# Patient Record
Sex: MALE | Race: WHITE | NOT HISPANIC OR LATINO | Employment: OTHER | URBAN - METROPOLITAN AREA
[De-identification: names, ages, dates, MRNs, and addresses within clinical notes are randomized per-mention and may not be internally consistent; named-entity substitution may affect disease eponyms.]

---

## 2017-05-03 ENCOUNTER — HOSPITAL ENCOUNTER (INPATIENT)
Facility: OTHER | Age: 67
LOS: 2 days | Discharge: HOME | DRG: 300 | End: 2017-05-05
Attending: EMERGENCY MEDICINE | Admitting: INTERNAL MEDICINE
Payer: MEDICARE

## 2017-05-03 DIAGNOSIS — I73.9 CLAUDICATION OF RIGHT LOWER EXTREMITY: ICD-10-CM

## 2017-05-03 DIAGNOSIS — I10 ESSENTIAL (PRIMARY) HYPERTENSION: ICD-10-CM

## 2017-05-03 DIAGNOSIS — I73.9 PERIPHERAL ARTERY DISEASE: ICD-10-CM

## 2017-05-03 LAB
ABO GROUP BLD: NORMAL
ALBUMIN SERPL BCP-MCNC: 4 G/DL
ALP SERPL-CCNC: 124 U/L
ALT SERPL W/O P-5'-P-CCNC: 20 U/L
ANION GAP SERPL CALC-SCNC: 9 MMOL/L
APTT BLDCRRT: 30 SEC
AST SERPL-CCNC: 19 U/L
BASOPHILS # BLD AUTO: 0.02 K/UL
BASOPHILS NFR BLD: 0.3 %
BILIRUB SERPL-MCNC: 0.6 MG/DL
BLD GP AB SCN CELLS X3 SERPL QL: NORMAL
BUN SERPL-MCNC: 26 MG/DL
CALCIUM SERPL-MCNC: 9.6 MG/DL
CHLORIDE SERPL-SCNC: 108 MMOL/L
CO2 SERPL-SCNC: 24 MMOL/L
CREAT SERPL-MCNC: 1.6 MG/DL
DIFFERENTIAL METHOD: ABNORMAL
EOSINOPHIL # BLD AUTO: 0.1 K/UL
EOSINOPHIL NFR BLD: 2 %
ERYTHROCYTE [DISTWIDTH] IN BLOOD BY AUTOMATED COUNT: 13.2 %
EST. GFR  (AFRICAN AMERICAN): 50 ML/MIN/1.73 M^2
EST. GFR  (NON AFRICAN AMERICAN): 43 ML/MIN/1.73 M^2
GLUCOSE SERPL-MCNC: 105 MG/DL
HCT VFR BLD AUTO: 39.3 %
HGB BLD-MCNC: 13.4 G/DL
INR PPP: 0.9
LYMPHOCYTES # BLD AUTO: 1.4 K/UL
LYMPHOCYTES NFR BLD: 21.7 %
MCH RBC QN AUTO: 30.7 PG
MCHC RBC AUTO-ENTMCNC: 34.1 %
MCV RBC AUTO: 90 FL
MONOCYTES # BLD AUTO: 0.5 K/UL
MONOCYTES NFR BLD: 7.6 %
NEUTROPHILS # BLD AUTO: 4.4 K/UL
NEUTROPHILS NFR BLD: 68.1 %
PLATELET # BLD AUTO: 140 K/UL
PMV BLD AUTO: 10.7 FL
POTASSIUM SERPL-SCNC: 5.1 MMOL/L
PROT SERPL-MCNC: 7.3 G/DL
PROTHROMBIN TIME: 10.5 SEC
RBC # BLD AUTO: 4.36 M/UL
RH BLD: NORMAL
SODIUM SERPL-SCNC: 141 MMOL/L
WBC # BLD AUTO: 6.49 K/UL

## 2017-05-03 PROCEDURE — 99284 EMERGENCY DEPT VISIT MOD MDM: CPT

## 2017-05-03 PROCEDURE — 96361 HYDRATE IV INFUSION ADD-ON: CPT

## 2017-05-03 PROCEDURE — 96366 THER/PROPH/DIAG IV INF ADDON: CPT

## 2017-05-03 PROCEDURE — 86901 BLOOD TYPING SEROLOGIC RH(D): CPT

## 2017-05-03 PROCEDURE — 86900 BLOOD TYPING SEROLOGIC ABO: CPT

## 2017-05-03 PROCEDURE — 80053 COMPREHEN METABOLIC PANEL: CPT

## 2017-05-03 PROCEDURE — 93005 ELECTROCARDIOGRAM TRACING: CPT

## 2017-05-03 PROCEDURE — 96365 THER/PROPH/DIAG IV INF INIT: CPT

## 2017-05-03 PROCEDURE — 85730 THROMBOPLASTIN TIME PARTIAL: CPT

## 2017-05-03 PROCEDURE — 25000003 PHARM REV CODE 250: Performed by: EMERGENCY MEDICINE

## 2017-05-03 PROCEDURE — 11000001 HC ACUTE MED/SURG PRIVATE ROOM

## 2017-05-03 PROCEDURE — 86850 RBC ANTIBODY SCREEN: CPT

## 2017-05-03 PROCEDURE — 85025 COMPLETE CBC W/AUTO DIFF WBC: CPT

## 2017-05-03 PROCEDURE — 93010 ELECTROCARDIOGRAM REPORT: CPT | Mod: ,,, | Performed by: INTERNAL MEDICINE

## 2017-05-03 PROCEDURE — 85610 PROTHROMBIN TIME: CPT

## 2017-05-03 RX ORDER — HEPARIN SODIUM 10000 [USP'U]/100ML
18 INJECTION, SOLUTION INTRAVENOUS
Status: COMPLETED | OUTPATIENT
Start: 2017-05-03 | End: 2017-05-03

## 2017-05-03 RX ORDER — ASPIRIN 81 MG/1
81 TABLET ORAL DAILY
Status: DISCONTINUED | OUTPATIENT
Start: 2017-05-04 | End: 2017-05-05 | Stop reason: HOSPADM

## 2017-05-03 RX ORDER — ALLOPURINOL 100 MG/1
100 TABLET ORAL DAILY
Status: DISCONTINUED | OUTPATIENT
Start: 2017-05-04 | End: 2017-05-05 | Stop reason: HOSPADM

## 2017-05-03 RX ORDER — AMLODIPINE BESYLATE 5 MG/1
10 TABLET ORAL DAILY
Status: DISCONTINUED | OUTPATIENT
Start: 2017-05-04 | End: 2017-05-05 | Stop reason: HOSPADM

## 2017-05-03 RX ORDER — PANTOPRAZOLE SODIUM 40 MG/1
40 TABLET, DELAYED RELEASE ORAL DAILY
Status: DISCONTINUED | OUTPATIENT
Start: 2017-05-04 | End: 2017-05-05 | Stop reason: HOSPADM

## 2017-05-03 RX ORDER — ONDANSETRON 2 MG/ML
4 INJECTION INTRAMUSCULAR; INTRAVENOUS EVERY 12 HOURS PRN
Status: DISCONTINUED | OUTPATIENT
Start: 2017-05-03 | End: 2017-05-05 | Stop reason: HOSPADM

## 2017-05-03 RX ORDER — BENAZEPRIL HYDROCHLORIDE 10 MG/1
20 TABLET ORAL DAILY
Status: DISCONTINUED | OUTPATIENT
Start: 2017-05-04 | End: 2017-05-05 | Stop reason: HOSPADM

## 2017-05-03 RX ORDER — SODIUM CHLORIDE AND POTASSIUM CHLORIDE 150; 900 MG/100ML; MG/100ML
INJECTION, SOLUTION INTRAVENOUS CONTINUOUS
Status: DISCONTINUED | OUTPATIENT
Start: 2017-05-03 | End: 2017-05-05 | Stop reason: HOSPADM

## 2017-05-03 RX ORDER — TAMSULOSIN HYDROCHLORIDE 0.4 MG/1
0.4 CAPSULE ORAL DAILY
Status: DISCONTINUED | OUTPATIENT
Start: 2017-05-04 | End: 2017-05-05 | Stop reason: HOSPADM

## 2017-05-03 RX ORDER — ATORVASTATIN CALCIUM 20 MG/1
40 TABLET, FILM COATED ORAL DAILY
Status: DISCONTINUED | OUTPATIENT
Start: 2017-05-04 | End: 2017-05-05 | Stop reason: HOSPADM

## 2017-05-03 RX ORDER — CARVEDILOL 6.25 MG/1
6.25 TABLET ORAL 2 TIMES DAILY
Status: DISCONTINUED | OUTPATIENT
Start: 2017-05-03 | End: 2017-05-05 | Stop reason: HOSPADM

## 2017-05-03 RX ORDER — CHOLECALCIFEROL (VITAMIN D3) 25 MCG
2000 TABLET ORAL DAILY
Status: DISCONTINUED | OUTPATIENT
Start: 2017-05-04 | End: 2017-05-05 | Stop reason: HOSPADM

## 2017-05-03 RX ORDER — OXYCODONE HYDROCHLORIDE 5 MG/1
5 TABLET ORAL EVERY 4 HOURS PRN
Status: DISCONTINUED | OUTPATIENT
Start: 2017-05-03 | End: 2017-05-05 | Stop reason: HOSPADM

## 2017-05-03 RX ADMIN — CARVEDILOL 6.25 MG: 6.25 TABLET, FILM COATED ORAL at 09:05

## 2017-05-03 RX ADMIN — HEPARIN SODIUM AND DEXTROSE 18 UNITS/KG/HR: 10000; 5 INJECTION INTRAVENOUS at 08:05

## 2017-05-03 RX ADMIN — POTASSIUM CHLORIDE AND SODIUM CHLORIDE: 900; 150 INJECTION, SOLUTION INTRAVENOUS at 10:05

## 2017-05-03 NOTE — IP AVS SNAPSHOT
[facility] ([facility])  [address] [place], [place] [id number]  Phone: [phone]           Patient Discharge Instructions   Our goal is to set you up for success. This packet includes information on your condition, medications, and your home care.  It will help you care for yourself to prevent having to return to the hospital.     Please ask your nurse if you have any questions.      There are many details to remember when preparing to leave the hospital. Here is what you will need to do:    1. Take your medicine. If you are prescribed medications, review your Medication List on the following pages. You may have new medications to  at the pharmacy and others that you'll need to stop taking. Review the instructions for how and when to take your medications. Talk with your doctor or nurses if you are unsure of what to do.     2. Go to your follow-up appointments. Specific follow-up information is listed in the following pages. Your may be contacted by a nurse or clinical provider about future appointments. Be sure we have all of the phone numbers to reach you. Please contact your provider's office if you are unable to make an appointment.     3. Watch for warning signs. Your doctor or nurse will give you detailed warning signs to watch for and when to call for assistance. These instructions may also include educational information about your condition. If you experience any of warning signs to your health, call your doctor.               ** Verify the list of medication(s) below is accurate and up to date. Carry this with you in case of emergency. If your medications have changed, please notify your healthcare provider.             Medication List      START taking these medications        Additional Info                      clopidogrel 75 mg tablet   Commonly known as:  PLAVIX   Quantity:  90 tablet   Refills:  0   Dose:  75 mg    Instructions:  Take 1 tablet (75 mg total) by mouth once  daily.     Begin Date    AM    Noon    PM    Bedtime         CONTINUE taking these medications        Additional Info                      allopurinol 100 MG tablet   Commonly known as:  ZYLOPRIM   Refills:  3   Dose:  100 mg    Last time this was given:  100 mg on 5/5/2017  9:57 AM   Instructions:  Take 100 mg by mouth once daily.     Begin Date    AM    Noon    PM    Bedtime       amlodipine 10 MG tablet   Commonly known as:  NORVASC   Quantity:  90 tablet   Refills:  3   Dose:  10 mg    Last time this was given:  10 mg on 5/5/2017  9:55 AM   Instructions:  Take 1 tablet (10 mg total) by mouth once daily.     Begin Date    AM    Noon    PM    Bedtime       aspirin 81 MG EC tablet   Commonly known as:  ECOTRIN   Quantity:  90 tablet   Refills:  3   Dose:  81 mg    Last time this was given:  81 mg on 5/5/2017  9:58 AM   Instructions:  Take 1 tablet (81 mg total) by mouth once daily.     Begin Date    AM    Noon    PM    Bedtime       atorvastatin 40 MG tablet   Commonly known as:  LIPITOR   Quantity:  30 tablet   Refills:  0    Last time this was given:  40 mg on 5/5/2017  9:56 AM   Instructions:  TAKE 1 TABLET BY MOUTH ONCE DAILY     Begin Date    AM    Noon    PM    Bedtime       benazepril 20 MG tablet   Commonly known as:  LOTENSIN   Quantity:  90 tablet   Refills:  3   Dose:  20 mg    Last time this was given:  20 mg on 5/5/2017  9:56 AM   Instructions:  Take 1 tablet (20 mg total) by mouth once daily.     Begin Date    AM    Noon    PM    Bedtime       carvedilol 6.25 MG tablet   Commonly known as:  COREG   Quantity:  180 tablet   Refills:  3   Dose:  6.25 mg    Last time this was given:  6.25 mg on 5/5/2017  9:57 AM   Instructions:  Take 1 tablet (6.25 mg total) by mouth 2 (two) times daily.     Begin Date    AM    Noon    PM    Bedtime       tamsulosin 0.4 mg Cp24   Commonly known as:  FLOMAX   Refills:  2   Dose:  0.4 mg    Last time this was given:  0.4 mg on 5/5/2017  9:55 AM   Instructions:  Take 0.4 mg  by mouth once daily.     Begin Date    AM    Noon    PM    Bedtime       vitamin D 1000 units Tab   Refills:  0   Dose:  2000 Units    Instructions:  Take 2,000 Units by mouth once daily.     Begin Date    AM    Noon    PM    Bedtime       VITAMIN D2 50,000 unit Cap   Refills:  0   Dose:  39434 Units   Generic drug:  ergocalciferol    Instructions:  Take 50,000 Units by mouth every 7 days.     Begin Date    AM    Noon    PM    Bedtime            Where to Get Your Medications      These medications were sent to [facility] [address]  [address] [facility] [address]     Phone:  [phone]     aspirin 81 MG EC tablet    clopidogrel 75 mg tablet                  Please bring to all follow up appointments:    1. A copy of your discharge instructions.  2. All medicines you are currently taking in their original bottles.  3. Identification and insurance card.    Please arrive 15 minutes ahead of scheduled appointment time.    Please call 24 hours in advance if you must reschedule your appointment and/or time.        Follow-up Information     Follow up with Conor Olivares MD.    Specialty:  Internal Medicine    Why:  Keep follow up appointment as scheduled    Contact information:    [address] [id number] [phone]          Follow up with Phoenix Ayers MD. Schedule an appointment as soon as possible for a visit in 3 weeks.    Specialty:  Cardiology    Contact information:    [address] [place], [place] [id number] [phone]          Discharge Instructions     Future Orders    Activity as tolerated     Diet general     Questions:    Total calories:      Fat restriction, if any:      Protein restriction, if any:      Na restriction, if any:      Fluid restriction:      Additional restrictions:  Cardiac (Low Na/Chol)        Discharge Instructions         Aspirin, ASA oral tablets  What is this  medicine?  ASPIRIN (AS pir in) is a pain reliever. It is used to treat mild pain and fever. This medicine is also used as directed by a doctor to prevent and to treat heart attacks, to prevent strokes, and to treat arthritis or inflammation.  How should I use this medicine?  Take this medicine by mouth with a glass of water. Follow the directions on the package or prescription label. You can take this medicine with or without food. If it upsets your stomach, take it with food. Do not take your medicine more often than directed.  Talk to your pediatrician regarding the use of this medicine in children. While this drug may be prescribed for children as young as 12 years of age for selected conditions, precautions do apply. Children and teenagers should not use this medicine to treat chicken pox or flu symptoms unless directed by a doctor.  Patients over 65 years old may have a stronger reaction and need a smaller dose.  What side effects may I notice from receiving this medicine?  Side effects that you should report to your doctor or health care professional as soon as possible:  · allergic reactions like skin rash, itching or hives, swelling of the face, lips, or tongue  · breathing problems  · changes in hearing, ringing in the ears  · confusion  · general ill feeling or flu-like symptoms  · pain on swallowing  · redness, blistering, peeling or loosening of the skin, including inside the mouth or nose  · signs and symptoms of bleeding such as bloody or black, tarry stools; red or dark-brown urine; spitting up blood or brown material that looks like coffee grounds; red spots on the skin; unusual bruising or bleeding from the eye, gums, or nose  · trouble passing urine or change in the amount of urine  · unusually weak or tired  · yellowing of the eyes or skin  Side effects that usually do not require medical attention (report to your doctor or health care professional if they continue or are bothersome):  · diarrhea  or constipation  · headache  · nausea, vomiting  · stomach gas, heartburn  What may interact with this medicine?  Do not take this medicine with any of the following medications:  · cidofovir  · ketorolac  · probenecid  This medicine may also interact with the following medications:  · alcohol  · alendronate  · bismuth subsalicylate  · flavocoxid  · herbal supplements like feverfew, garlic, zaira, ginkgo biloba, horse chestnut  · medicines for diabetes or glaucoma like acetazolamide, methazolamide  · medicines for gout  · medicines that treat or prevent blood clots like enoxaparin, heparin, ticlopidine, warfarin  · other aspirin and aspirin-like medicines  · NSAIDs, medicines for pain and inflammation, like ibuprofen or naproxen  · pemetrexed  · sulfinpyrazone  · varicella live vaccine  What if I miss a dose?  If you are taking this medicine on a regular schedule and miss a dose, take it as soon as you can. If it is almost time for your next dose, take only that dose. Do not take double or extra doses.  Where should I keep my medicine?  Keep out of the reach of children.  Store at room temperature between 15 and 30 degrees C (59 and 86 degrees F). Protect from heat and moisture. Do not use this medicine if it has a strong vinegar smell. Throw away any unused medicine after the expiration date.  What should I tell my health care provider before I take this medicine?  They need to know if you have any of these conditions:  · anemia  · asthma  · bleeding problems  · child with chickenpox, the flu, or other viral infection  · diabetes  · gout  · if you frequently drink alcohol containing drinks  · kidney disease  · liver disease  · low level of vitamin K  · lupus  · smoke tobacco  · stomach ulcers or other problems  · an unusual or allergic reaction to aspirin, tartrazine dye, other medicines, dyes, or preservatives  · pregnant or trying to get pregnant  · breast-feeding  What should I watch for while using this  medicine?  If you are treating yourself for pain, tell your doctor or health care professional if the pain lasts more than 10 days, if it gets worse, or if there is a new or different kind of pain. Tell your doctor if you see redness or swelling. Also, check with your doctor if you have a fever that lasts for more than 3 days. Only take this medicine to prevent heart attacks or blood clotting if prescribed by your doctor or health care professional.  Do not take aspirin or aspirin-like medicines with this medicine. Too much aspirin can be dangerous. Always read the labels carefully.  This medicine can irritate your stomach or cause bleeding problems. Do not smoke cigarettes or drink alcohol while taking this medicine. Do not lie down for 30 minutes after taking this medicine to prevent irritation to your throat.  If you are scheduled for any medical or dental procedure, tell your healthcare provider that you are taking this medicine. You may need to stop taking this medicine before the procedure.  This medicine may be used to treat migraines. If you take migraine medicines for 10 or more days a month, your migraines may get worse. Keep a diary of headache days and medicine use. Contact your healthcare professional if your migraine attacks occur more frequently.  Date Last Reviewed:   NOTE:This sheet is a summary. It may not cover all possible information. If you have questions about this medicine, talk to your doctor, pharmacist, or health care provider. Copyright© 2016 [employer]        Clopidogrel Bisulfate Oral tablet  What is this medicine?  CLOPIDOGREL (kloh PID oh grel) helps to prevent blood clots. This medicine is used to prevent heart attack, stroke, or other vascular events in people who are at high risk.  How should I use this medicine?  Take this medicine by mouth with a drink of water. Follow the directions on the prescription label. You may take this medicine with or without food. Take your medicine  at regular intervals. Do not take your medicine more often than directed.  Talk to your pediatrician regarding the use of this medicine in children. Special care may be needed.  What side effects may I notice from receiving this medicine?  Side effects that you should report to your doctor or health care professional as soon as possible:  · allergic reactions like skin rash, itching or hives, swelling of the face, lips, or tongue  · breathing problems  · changes in vision  · fever  · signs and symptoms of bleeding such as bloody or black, tarry stools; red or dark-brown urine; spitting up blood or brown material that looks like coffee grounds; red spots on the skin; unusual bruising or bleeding from the eye, gums, or nose  · sudden weakness  · unusual bleeding or bruising  Side effects that usually do not require medical attention (report to your doctor or health care professional if they continue or are bothersome):  · constipation or diarrhea  · headache  · pain in back or joints  · stomach upset  What may interact with this medicine?  · aspirin  · blood thinners like cilostazol, enoxaparin, ticlopidine, and warfarin  · certain medicines for depression like citalopram, fluoxetine, and fluvoxamine  · certain medicines for fungal infections like ketoconazole, fluconazole, and voriconazole  · certain medicines for HIV infection like delavirdine, efavirenz, and etravirine  · certain medicines for seizures like felbamate, oxcarbazepine, and phenytoin  · chloramphenicol  · fluvastatin  · isoniazid, INH  · medicines for inflammation like ibuprofen and naproxen  · modafinil  · nicardipine  · over-the counter supplements like echinacea, feverfew, fish oil, garlic, zaira, ginkgo, green tea, horse chestnut  · quinine  · stomach acid blockers like cimetidine, omeprazole, and esomeprazole  · tamoxifen  · tolbutamide  · topiramate  · torsemide  What if I miss a dose?  If you miss a dose, take it as soon as you can. If it is  almost time for your next dose, take only that dose. Do not take double or extra doses.  Where should I keep my medicine?  Keep out of the reach of children.  Store at room temperature of 59 to 86 degrees F (15 to 30 degrees C). Throw away any unused medicine after the expiration date.  What should I tell my health care provider before I take this medicine?  They need to know if you have any of the following conditions:  · bleeding disorder  · bleeding in the brain  · planned surgery  · stomach or intestinal ulcers  · stroke or transient ischemic attack  · an unusual or allergic reaction to clopidogrel, other medicines, foods, dyes, or preservatives  · pregnant or trying to get pregnant  · breast-feeding  What should I watch for while using this medicine?  Visit your doctor or health care professional for regular check ups. Do not stop taking your medicine unless your doctor tells you to.  Notify your doctor or health care professional and seek emergency treatment if you develop breathing problems; changes in vision; chest pain; severe, sudden headache; pain, swelling, warmth in the leg; trouble speaking; sudden numbness or weakness of the face, arm or leg. These can be signs that your condition has gotten worse.  If you are going to have surgery or dental work, tell your doctor or health care professional that you are taking this medicine.  Certain genetic factors may reduce the effect of this medicine. Your doctor may use genetic tests to determine treatment.  Date Last Reviewed:   NOTE:This sheet is a summary. It may not cover all possible information. If you have questions about this medicine, talk to your doctor, pharmacist, or health care provider. Copyright© 2016 [employer]      Step-by-Step:  Inspecting Your Feet (PAD)    Date Last Reviewed: 10/1/2016  © 2034-4048 [employer]. [address] [place] [address], [place] [address]. All rights reserved. This information is not intended as a substitute for  "professional medical care. Always follow your healthcare professional's instructions.              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      Admission Information     Date & Time Provider Department CSN    5/3/2017  7:00 PM Phoenix Ayers MD [facility] [id number]      Care Providers     Provider Role Specialty Primary office phone    Phoenix Ayers MD Attending Provider Cardiology [phone]    Aris Finney Jr., MD Consulting Physician  Vascular Surgery [phone]    Phoenix Ayers MD Surgeon  Cardiology [phone]      Your Vitals Were     BP Pulse Temp Resp Height Weight    128/59 (BP Location: Left arm, Patient Position: Lying, BP Method: Automatic) 66 98 °F (36.7 °C) (Oral) 18 5' 9.5" (1.765 m) 88 kg (194 lb)    SpO2 BMI             97% 28.24 kg/m2         Recent Lab Values     No lab values to display.      Allergies as of 5/5/2017     No Known Allergies      [facility]     [facility] Nurse Care Line - 24/7 Assistance  Unless otherwise directed by your provider, please contact [facility], our nurse care line that is available for 24/7 assistance.     Registered nurses in the [facility] provide clinical advisement, health education, appointment booking, and other advisory services.  Call for this free service at [phone].        Advance Directives     An advance directive is a document which, in the event you are no longer able to make decisions for yourself, tells your healthcare team what kind of treatment you do or do not want to receive, or who you would like to make those decisions for you.  If you do not currently have an advance directive, [facility] encourages you to create one.  For more information call:  [phone]), [phone] ([phone]),  or log on to [email or web address].        Smoking Cessation     If you would like to quit smoking:   You may be " eligible for free services if you are a [place] resident and started smoking cigarettes before September 1, 1988.  Call the Smoking Cessation Trust (SCT) toll free at (484) 242-9386 or (984) 835-8493.   Call 1-800-QUIT-NOW if you do not meet the above criteria.   Contact us via email: [email or web address]   View our website for more information: [email or web address]        Language Assistance Services     ATTENTION: Language assistance services are available, free of charge. Please call [phone].      ATENCIÓN: Si habla español, tiene a ochoa disposición servicios gratuitos de asistencia lingüística. Llame al [phone].     CHÚ Ý: N?u b?n nói Ti?ng Vi?t, có các d?ch v? h? tr? ngôn ng? mi?n phí dành cho b?n. G?i s? [phone].        Chronic Kindey Disease Education              [facility] complies with applicable Federal civil rights laws and does not discriminate on the basis of race, color, national origin, age, disability, or sex.

## 2017-05-04 ENCOUNTER — SURGERY (OUTPATIENT)
Age: 67
End: 2017-05-04

## 2017-05-04 LAB
ANION GAP SERPL CALC-SCNC: 7 MMOL/L
APTT BLDCRRT: >150 SEC
BUN SERPL-MCNC: 24 MG/DL
CALCIUM SERPL-MCNC: 8.3 MG/DL
CHLORIDE SERPL-SCNC: 111 MMOL/L
CO2 SERPL-SCNC: 21 MMOL/L
CREAT SERPL-MCNC: 1.4 MG/DL
ERYTHROCYTE [DISTWIDTH] IN BLOOD BY AUTOMATED COUNT: 13.3 %
EST. GFR  (AFRICAN AMERICAN): 59 ML/MIN/1.73 M^2
EST. GFR  (NON AFRICAN AMERICAN): 51 ML/MIN/1.73 M^2
GLUCOSE SERPL-MCNC: 99 MG/DL
HCT VFR BLD AUTO: 33.5 %
HGB BLD-MCNC: 11.5 G/DL
MCH RBC QN AUTO: 30.7 PG
MCHC RBC AUTO-ENTMCNC: 34.3 %
MCV RBC AUTO: 90 FL
PERIPHERAL STENOSIS: ABNORMAL
PLATELET # BLD AUTO: 123 K/UL
PMV BLD AUTO: 10.5 FL
POTASSIUM SERPL-SCNC: 4.2 MMOL/L
RBC # BLD AUTO: 3.74 M/UL
SODIUM SERPL-SCNC: 139 MMOL/L
WBC # BLD AUTO: 5.31 K/UL

## 2017-05-04 PROCEDURE — 36415 COLL VENOUS BLD VENIPUNCTURE: CPT

## 2017-05-04 PROCEDURE — C1769 GUIDE WIRE: HCPCS

## 2017-05-04 PROCEDURE — 25000003 PHARM REV CODE 250: Performed by: INTERNAL MEDICINE

## 2017-05-04 PROCEDURE — 85027 COMPLETE CBC AUTOMATED: CPT

## 2017-05-04 PROCEDURE — 25000003 PHARM REV CODE 250

## 2017-05-04 PROCEDURE — 25500020 PHARM REV CODE 255

## 2017-05-04 PROCEDURE — 85730 THROMBOPLASTIN TIME PARTIAL: CPT

## 2017-05-04 PROCEDURE — 11000001 HC ACUTE MED/SURG PRIVATE ROOM

## 2017-05-04 PROCEDURE — 80048 BASIC METABOLIC PNL TOTAL CA: CPT

## 2017-05-04 PROCEDURE — 63600175 PHARM REV CODE 636 W HCPCS

## 2017-05-04 PROCEDURE — 25000003 PHARM REV CODE 250: Performed by: EMERGENCY MEDICINE

## 2017-05-04 PROCEDURE — B51BYZZ FLUOROSCOPY OF RIGHT LOWER EXTREMITY VEINS USING OTHER CONTRAST: ICD-10-PCS | Performed by: INTERNAL MEDICINE

## 2017-05-04 RX ORDER — HYDROCODONE BITARTRATE AND ACETAMINOPHEN 5; 325 MG/1; MG/1
1 TABLET ORAL EVERY 4 HOURS PRN
Status: DISCONTINUED | OUTPATIENT
Start: 2017-05-04 | End: 2017-05-05 | Stop reason: HOSPADM

## 2017-05-04 RX ORDER — DIPHENHYDRAMINE HCL 25 MG
25 CAPSULE ORAL
Status: DISCONTINUED | OUTPATIENT
Start: 2017-05-04 | End: 2017-05-04

## 2017-05-04 RX ORDER — SODIUM CHLORIDE 9 MG/ML
INJECTION, SOLUTION INTRAVENOUS CONTINUOUS
Status: DISCONTINUED | OUTPATIENT
Start: 2017-05-04 | End: 2017-05-05 | Stop reason: HOSPADM

## 2017-05-04 RX ORDER — ACETAMINOPHEN 325 MG/1
650 TABLET ORAL EVERY 4 HOURS PRN
Status: DISCONTINUED | OUTPATIENT
Start: 2017-05-04 | End: 2017-05-05 | Stop reason: HOSPADM

## 2017-05-04 RX ORDER — HEPARIN SODIUM 10000 [USP'U]/100ML
12 INJECTION, SOLUTION INTRAVENOUS
Status: DISCONTINUED | OUTPATIENT
Start: 2017-05-04 | End: 2017-05-04

## 2017-05-04 RX ORDER — SODIUM CHLORIDE 9 MG/ML
INJECTION, SOLUTION INTRAVENOUS CONTINUOUS
Status: ACTIVE | OUTPATIENT
Start: 2017-05-04 | End: 2017-05-04

## 2017-05-04 RX ORDER — HEPARIN SODIUM 10000 [USP'U]/100ML
15 INJECTION, SOLUTION INTRAVENOUS CONTINUOUS
Status: DISCONTINUED | OUTPATIENT
Start: 2017-05-04 | End: 2017-05-04

## 2017-05-04 RX ADMIN — HEPARIN SODIUM AND DEXTROSE 15 UNITS/KG/HR: 10000; 5 INJECTION INTRAVENOUS at 09:05

## 2017-05-04 RX ADMIN — ATORVASTATIN CALCIUM 40 MG: 20 TABLET, FILM COATED ORAL at 09:05

## 2017-05-04 RX ADMIN — CARVEDILOL 6.25 MG: 6.25 TABLET, FILM COATED ORAL at 09:05

## 2017-05-04 RX ADMIN — PANTOPRAZOLE SODIUM 40 MG: 40 TABLET, DELAYED RELEASE ORAL at 09:05

## 2017-05-04 RX ADMIN — TAMSULOSIN HYDROCHLORIDE 0.4 MG: 0.4 CAPSULE ORAL at 09:05

## 2017-05-04 RX ADMIN — AMLODIPINE BESYLATE 10 MG: 5 TABLET ORAL at 09:05

## 2017-05-04 RX ADMIN — BENAZEPRIL HYDROCHLORIDE 20 MG: 10 TABLET, FILM COATED ORAL at 09:05

## 2017-05-04 RX ADMIN — ASPIRIN 81 MG: 81 TABLET, COATED ORAL at 09:05

## 2017-05-04 RX ADMIN — ALLOPURINOL 100 MG: 100 TABLET ORAL at 09:05

## 2017-05-04 RX ADMIN — POTASSIUM CHLORIDE AND SODIUM CHLORIDE: 900; 150 INJECTION, SOLUTION INTRAVENOUS at 06:05

## 2017-05-04 RX ADMIN — ACETAMINOPHEN 650 MG: 325 TABLET ORAL at 01:05

## 2017-05-04 RX ADMIN — POTASSIUM CHLORIDE AND SODIUM CHLORIDE: 900; 150 INJECTION, SOLUTION INTRAVENOUS at 03:05

## 2017-05-04 NOTE — PROGRESS NOTES
Report received from MONTY Nails. Pt arrived to floor via stretcher with MONTY Hutchison and transferred with assist  to bed. Oriented to room, call light placed within reach, bed low and locked. Complaints of HA. Incisions noted to L groin, CDI. Ice pack in place.  Assessed posterior tibial pulses with doppler. Educated Pt to remain flat for 5 hours. No acute distress noted at this time. Will continue to monitor.     1858  Patient up to toilet; voided spontaneously, BM X1. Denied pain. Tolerating ordered diet. Will continue to monitor.

## 2017-05-04 NOTE — ED PROVIDER NOTES
Encounter Date: 5/3/2017    SCRIBE #1 NOTE: I, Genoveva Quiroz, am scribing for, and in the presence of,  Dr. Hannah. I have scribed the entire note.       History     Chief Complaint   Patient presents with    Foot Problem     Pt CO right foot numbness tingling, and being cool to the touch. sent to ED by Dr Ayers for admission.     Review of patient's allergies indicates:  No Known Allergies  HPI Comments: Time seen by provider: 7:15 PM    This is a 67 y.o. male h/o PAD/HTN who presents with complaint of right foot paresthesias and RLE pain. He reports onset of symptoms was about 1 week ago, no clear precipitant. As per the patient's medical record in March 2016, he had right Fem-Pop bypass due to SFA occlusion found after he presented with R toe pain and discoloration. Also noted in the record, 1 week ago the patient had recurrent toe paresthesias, and pain in right lower leg with any activity. He reports he was referred to the ED for evaluation by Dr. Ayers in clinic today.  He notes associated decreased warmth in foot but denies any open wounds, fever or chills. The patient does note he has been having swelling in the legs has been chronic since bypass last year, but actually improved in the last week from baseline. He denies the use of any medication for the symptoms, and is compliant with ASA. No CP/SOB or other complaints    The history is provided by the patient.     Past Medical History:   Diagnosis Date    BPH (benign prostatic hyperplasia)     Chronic kidney disease, stage III (moderate) 3/1/2016    12/10/2015: BUN/crea: 32/1.63. CrCl 43.    Claudication in peripheral vascular disease     History of tobacco use 11/4/2016    1970: Began smoking.   3/1/2016: Quit smoking.    Hypertension, benign 3/1/2016    2012: Diagnosed.    Hyperuricemia     Leg swelling 3/14/2016    3/4/2016: Began having right leg edema.    PAD (peripheral artery disease)     Peripheral artery disease 3/1/2016     2014: Began experience bilateral calf claudication.  1/2016: [facility]: Arterial Duplex: Right: SFA: distal severe. RAVI 0.78. Left: SFA: mid occlusion. RAVI 0.65. 2/1/2016: Began experience pain in right great toe.    Tobacco use 3/1/2016    1970: Began smoking. Unable to quit.    Vitamin D deficiency disease      No past surgical history on file.  No family history on file.  Social History   Substance Use Topics    Smoking status: Former Smoker     Packs/day: 0.75     Years: 46.00     Start date: 1/1/1970     Quit date: 3/1/2016    Smokeless tobacco: Never Used    Alcohol use No     Review of Systems   Constitutional: Negative for chills and fever.   HENT: Negative for congestion and sore throat.    Eyes: Negative for redness and visual disturbance.   Respiratory: Negative for cough and shortness of breath.    Cardiovascular: Negative for chest pain and palpitations.   Gastrointestinal: Negative for abdominal pain, diarrhea, nausea and vomiting.   Genitourinary: Negative for dysuria.   Musculoskeletal: Negative for back pain.        Right shin pain   Skin: Negative for rash.   Neurological: Positive for numbness (right foot). Negative for weakness and headaches.   Psychiatric/Behavioral: Negative for confusion.       Physical Exam   Initial Vitals   BP Pulse Resp Temp SpO2   05/03/17 1825 05/03/17 1825 05/03/17 1825 05/03/17 1825 05/03/17 1825   136/80 81 18 98.4 °F (36.9 °C) 98 %     Physical Exam    Nursing note and vitals reviewed.  Constitutional: He appears well-developed and well-nourished. He is not diaphoretic. No distress.   HENT:   Head: Normocephalic and atraumatic.   Right Ear: External ear normal.   Left Ear: External ear normal.   Eyes: Conjunctivae and EOM are normal.   Neck: Normal range of motion. Neck supple.   Cardiovascular: Normal rate and regular rhythm. Exam reveals gallop and S4. Exam reveals no friction rub.    No murmur heard.  No palpable bilateral popliteal or DP pulse.   Left DP  pulse present by doppler.   Right popliteal pulse present by doppler. Right DP pulse is not present by Doppler   Pulmonary/Chest: He has no wheezes. He has no rhonchi. He has no rales.   Decreased breath sounds diffusely   Musculoskeletal: Normal range of motion. He exhibits edema. He exhibits no tenderness.   1+ edema of RLE.    Lymphadenopathy:     He has no cervical adenopathy.   Neurological: He is alert and oriented to person, place, and time. He has normal strength. No cranial nerve deficit.   Skin: Skin is warm and dry. No rash noted.   Right toe with slight purple hue and colder when compared to left.          ED Course   Procedures  Labs Reviewed   COMPREHENSIVE METABOLIC PANEL - Abnormal; Notable for the following:        Result Value    BUN, Bld 26 (*)     Creatinine 1.6 (*)     eGFR if  50 (*)     eGFR if non  43 (*)     All other components within normal limits   CBC W/ AUTO DIFFERENTIAL - Abnormal; Notable for the following:     RBC 4.36 (*)     Hemoglobin 13.4 (*)     Hematocrit 39.3 (*)     Platelets 140 (*)     All other components within normal limits   PROTIME-INR   APTT   TYPE & SCREEN     EKG Readings: (Independently Interpreted)   EKG Reading (7:56 PM): Sinus bradycardia with a rate of 57. Right bundle pattern. No STEMI Unchanged from previous tracing.           Medical Decision Making:   History:   Old Medical Records: I decided to obtain old medical records.  Old Records Summarized: records from clinic visits and records from previous admission(s).  Initial Assessment:       67M with h/o PAD/HTN sent by Cards for admission after he has recurrent RLE claudication and R toe discoloration and paresthesias x 1 week. He had similar sx that led to dx of SFA occlusion and R fem-pop bypass one year ago, and no issues until one week ago. Exam with no palpable or Doppler R DP or PT pulse, and some toe discoloration and less warmth, but he does have R popliteal pulses by  Doppler, indicating loss of flow distal to popliteal, vs stenosis of fem-pop bypass graft.   Discussed with Dr. Ayers.  Will start IV heparin emergently and check basic labs, order arterial study sono of RLE, and admit for likely angiogram tomorrow. Dr. Finney, who performed bypass last year, also consulted.    Update:  Labs with slight worsening renal fxn, will hydrate and recheck tomorrow.      Independently Interpreted Test(s):   I have ordered and independently interpreted EKG Reading(s) - see prior notes  Clinical Tests:   Lab Tests: Ordered and Reviewed  Radiological Study: Ordered  Medical Tests: Reviewed and Ordered  Other:   I have discussed this case with another health care provider.    Additional MDM:   EKG: I have independently interpreted EKG(s) - see notes.          Scribe Attestation:   Scribe #1: I performed the above scribed service and the documentation accurately describes the services I performed. I attest to the accuracy of the note.    Attending Attestation:         Attending Critical Care:   Critical Care Times:   Direct Patient Care (initial evaluation, reassessments, and time considering the case)................................................................18 minutes.   Additional History from reviewing old medical records or taking additional history from the family, EMS, PCP, etc.......................6 minutes.   Ordering, Reviewing, and Interpreting Diagnostic Studies...............................................................................................................5 minutes.   Documentation..................................................................................................................................................................................5 minutes.   Consultation with other Physicians. .................................................................................................................................................5 minutes.    ==============================================================  · Total Critical Care Time - exclusive of procedural time: 39 minutes.  ==============================================================  Critical Care Condition: potentially life-threatening   Critical Care Comments: Emergent anti-coagulation, concern for stent stenosis     Physician Attestation for Scribe:  Physician Attestation Statement for Scribe #1: I, Dr. Hannah, reviewed documentation, as scribed by Genoveva Quiroz in my presence, and it is both accurate and complete.                 ED Course     Clinical Impression:     1. Peripheral artery disease    2. Claudication of right lower extremity              Michael Hannah MD  05/03/17 2056       Michael Hannah MD  05/11/17 1929

## 2017-05-04 NOTE — SUBJECTIVE & OBJECTIVE
Past Medical History:   Diagnosis Date    BPH (benign prostatic hyperplasia)     Chronic kidney disease, stage III (moderate) 3/1/2016    12/10/2015: BUN/crea: 32/1.63. CrCl 43.    Claudication in peripheral vascular disease     History of tobacco use 11/4/2016    1970: Began smoking.   3/1/2016: Quit smoking.    Hypertension, benign 3/1/2016    2012: Diagnosed.    Hyperuricemia     Leg swelling 3/14/2016    3/4/2016: Began having right leg edema.    PAD (peripheral artery disease)     Peripheral artery disease 3/1/2016    2014: Began experience bilateral calf claudication.  1/2016: [facility]: Arterial Duplex: Right: SFA: distal severe. RAVI 0.78. Left: SFA: mid occlusion. RAVI 0.65. 2/1/2016: Began experience pain in right great toe.    Tobacco use 3/1/2016    1970: Began smoking. Unable to quit.    Vitamin D deficiency disease        No past surgical history on file.    Review of patient's allergies indicates:  No Known Allergies    No current facility-administered medications on file prior to encounter.      Current Outpatient Prescriptions on File Prior to Encounter   Medication Sig    allopurinol (ZYLOPRIM) 100 MG tablet Take 100 mg by mouth once daily.    amlodipine (NORVASC) 10 MG tablet Take 1 tablet (10 mg total) by mouth once daily.    aspirin (ECOTRIN) 81 MG EC tablet Take 1 tablet (81 mg total) by mouth once daily.    atorvastatin (LIPITOR) 40 MG tablet TAKE 1 TABLET BY MOUTH ONCE DAILY    benazepril (LOTENSIN) 20 MG tablet Take 1 tablet (20 mg total) by mouth once daily.    carvedilol (COREG) 6.25 MG tablet Take 1 tablet (6.25 mg total) by mouth 2 (two) times daily.    tamsulosin (FLOMAX) 0.4 mg Cp24 Take 0.4 mg by mouth once daily.     vitamin D 1000 units Tab Take 2,000 Units by mouth once daily.    VITAMIN D2 50,000 unit capsule Take 50,000 Units by mouth every 7 days.      Family History     None        Social History Main Topics    Smoking status: Former Smoker     Packs/day: 0.75      Years: 46.00     Start date: 1/1/1970     Quit date: 3/1/2016    Smokeless tobacco: Never Used    Alcohol use No    Drug use: Not on file    Sexual activity: Not on file     Review of Systems   Constitution: Negative for chills, fever, weakness and malaise/fatigue.   HENT: Negative for headaches and nosebleeds.    Eyes: Negative for double vision, vision loss in left eye and vision loss in right eye.   Cardiovascular: Positive for claudication (pain right foot with minimal activities) and leg swelling. Negative for chest pain, dyspnea on exertion, irregular heartbeat, near-syncope, orthopnea, palpitations, paroxysmal nocturnal dyspnea and syncope.   Respiratory: Negative for cough, hemoptysis, shortness of breath and wheezing.    Endocrine: Negative for cold intolerance and heat intolerance.   Hematologic/Lymphatic: Negative for bleeding problem. Does not bruise/bleed easily.   Skin: Negative for color change and rash.   Musculoskeletal: Negative for back pain, falls, muscle weakness and myalgias.   Gastrointestinal: Negative for heartburn, hematemesis, hematochezia, hemorrhoids, jaundice, melena, nausea and vomiting.   Genitourinary: Negative for dysuria and hematuria.   Neurological: Negative for dizziness, focal weakness, light-headedness, loss of balance, numbness and vertigo.   Psychiatric/Behavioral: Negative for altered mental status, depression and memory loss. The patient is not nervous/anxious.    Allergic/Immunologic: Negative for hives and persistent infections.     Objective:     Vital Signs (Most Recent):  Temp: 98.4 °F (36.9 °C) (05/03/17 1825)  Pulse: 81 (05/03/17 1825)  Resp: 18 (05/03/17 1825)  BP: 136/80 (05/03/17 1825)  SpO2: 98 % (05/03/17 1825) Vital Signs (24h Range):  Temp:  [98.4 °F (36.9 °C)] 98.4 °F (36.9 °C)  Pulse:  [77-81] 81  Resp:  [18] 18  SpO2:  [98 %] 98 %  BP: (136-139)/(68-80) 136/80     Weight: 88 kg (194 lb)  Body mass index is 28.24 kg/(m^2).    SpO2: 98 %  O2 Device  (Oxygen Therapy): room air    No intake or output data in the 24 hours ending 05/03/17 1955    Lines/Drains/Airways     Drain                 Closed/Suction Drain 03/04/16 1501 Right Thigh Bulb 15 Fr. 425 days         Closed/Suction Drain 03/04/16 1528 Right Leg Bulb 15 Fr. 425 days                Physical Exam   Constitutional: He is oriented to person, place, and time. He appears well-developed and well-nourished.  Non-toxic appearance. No distress.   HENT:   Head: Normocephalic and atraumatic.   Nose: Nose normal.   Eyes: Right eye exhibits no discharge. Left eye exhibits no discharge. Right conjunctiva is not injected. Left conjunctiva is not injected. Right pupil is round. Left pupil is round. Pupils are equal.   Neck: Neck supple. No JVD present. Carotid bruit is not present. No thyromegaly present.   Cardiovascular: Normal rate, regular rhythm, S1 normal and S2 normal.   No extrasystoles are present. PMI is not displaced.  Exam reveals gallop and S4.    Pulses:       Radial pulses are 2+ on the right side, and 2+ on the left side.        Femoral pulses are 2+ on the right side, and 2+ on the left side.       Dorsalis pedis pulses are 0 on the left side.        Posterior tibial pulses are 0 on the left side.   Right foot with decreased temperature.   Pulmonary/Chest: Effort normal and breath sounds normal.   Abdominal: Soft. Normal appearance. There is no hepatosplenomegaly. There is no tenderness.   Musculoskeletal:        Right ankle: He exhibits no swelling, no ecchymosis and no deformity.        Left ankle: He exhibits no swelling, no ecchymosis and no deformity.   Lymphadenopathy:        Head (right side): No submandibular adenopathy present.        Head (left side): No submandibular adenopathy present.     He has no cervical adenopathy.   Neurological: He is alert and oriented to person, place, and time. He is not disoriented. No cranial nerve deficit.   Skin: Skin is warm, dry and intact. No rash noted.  He is not diaphoretic. No pallor.   Psychiatric: He has a normal mood and affect. His speech is normal and behavior is normal. Judgment and thought content normal. Cognition and memory are normal.     Current Medications:     [START ON 5/4/2017] allopurinol  100 mg Oral Daily    [START ON 5/4/2017] amlodipine  10 mg Oral Daily    [START ON 5/4/2017] aspirin  81 mg Oral Daily    [START ON 5/4/2017] atorvastatin  40 mg Oral Daily    [START ON 5/4/2017] benazepril  20 mg Oral Daily    carvedilol  6.25 mg Oral BID    heparin (PORCINE)  70 Units/kg Intravenous Once    heparin (porcine) in 5 % dex  18 Units/kg/hr Intravenous ED 1 Time    [START ON 5/4/2017] pantoprazole  40 mg Oral Daily    [START ON 5/4/2017] tamsulosin  0.4 mg Oral Daily    [START ON 5/4/2017] vitamin D  2,000 Units Oral Daily     Current Laboratory Results:    No results found for this or any previous visit (from the past 24 hour(s)).  Current Imaging Results:    Imaging Results     None

## 2017-05-04 NOTE — PROGRESS NOTES
S/p Rt RSVG fem pop 2/16  Angio shows occlusion with 1 vessel run off via P.T  No tissue breakdown, weak doppler  No rest pain  rec  new  fem pop   Pt  Non commital

## 2017-05-04 NOTE — PLAN OF CARE
DC Planning:    Writer attempted to meet with patient for discharge assessment, patient undergoing Angiogram per MONTY Roach. Writer to follow up with patient upon returning to room.

## 2017-05-04 NOTE — H&P
[facility]  Cardiology  History and Physical     Patient Name: Brooks Canas  MRN: [id number]  Admission Date: 5/3/2017  Code Status: Full Code   Attending Provider: Phoenix Ayers M.D.  Primary Care Physician: Conor Olivares MD  Principal Problem:Atherosclerosis of artery of extremity with rest pain    Patient information was obtained from patient, past medical records and ER records.     Subjective:     Chief Complaint:  Pain right foot.     HPI:     Brooks Canas is a 67 y.o. male patient with hypertension who has been a smoker since 1970 but been unable to quit until 3/1/2016. He developed left calf claudication in 2014. In the beginning of 2/2016 he began experience pain in his right great toe that then slowly got more pronounced and later began aching at night. An Arterial Duplex study on 1/8/2016 suggested bilateral severe SFA disease. He was referred for urgent angiography that was performed on 3/2/2016 revealing bilateral SFA occlusions with two vessel runoff. On 3/4/2016 he underwent fem-pop bypass using a reverse saphenous vein. The right foot became warm and the rest pain resolved. The color of the right toe improved and healed. There has been no left calf claudication. On 4/28/2017 when visiting his sister in [place] he got sudden onset of pain in the right foot and he has since gotten pain in the right leg with minimal activities but no pain at rest. He was seen in the office today and it was arranged for him to be admitted and to undergo AF angiography.       Past Medical History:   Diagnosis Date    BPH (benign prostatic hyperplasia)     Chronic kidney disease, stage III (moderate) 3/1/2016    12/10/2015: BUN/crea: 32/1.63. CrCl 43.    Claudication in peripheral vascular disease     History of tobacco use 11/4/2016    1970: Began smoking.   3/1/2016: Quit smoking.    Hypertension, benign 3/1/2016    2012: Diagnosed.    Hyperuricemia     Leg swelling  3/14/2016    3/4/2016: Began having right leg edema.    PAD (peripheral artery disease)     Peripheral artery disease 3/1/2016    2014: Began experience bilateral calf claudication.  1/2016: [facility]: Arterial Duplex: Right: SFA: distal severe. RAVI 0.78. Left: SFA: mid occlusion. RAVI 0.65. 2/1/2016: Began experience pain in right great toe.    Tobacco use 3/1/2016    1970: Began smoking. Unable to quit.    Vitamin D deficiency disease        No past surgical history on file.    Review of patient's allergies indicates:  No Known Allergies    No current facility-administered medications on file prior to encounter.      Current Outpatient Prescriptions on File Prior to Encounter   Medication Sig    allopurinol (ZYLOPRIM) 100 MG tablet Take 100 mg by mouth once daily.    amlodipine (NORVASC) 10 MG tablet Take 1 tablet (10 mg total) by mouth once daily.    aspirin (ECOTRIN) 81 MG EC tablet Take 1 tablet (81 mg total) by mouth once daily.    atorvastatin (LIPITOR) 40 MG tablet TAKE 1 TABLET BY MOUTH ONCE DAILY    benazepril (LOTENSIN) 20 MG tablet Take 1 tablet (20 mg total) by mouth once daily.    carvedilol (COREG) 6.25 MG tablet Take 1 tablet (6.25 mg total) by mouth 2 (two) times daily.    tamsulosin (FLOMAX) 0.4 mg Cp24 Take 0.4 mg by mouth once daily.     vitamin D 1000 units Tab Take 2,000 Units by mouth once daily.    VITAMIN D2 50,000 unit capsule Take 50,000 Units by mouth every 7 days.      Family History     None        Social History Main Topics    Smoking status: Former Smoker     Packs/day: 0.75     Years: 46.00     Start date: 1/1/1970     Quit date: 3/1/2016    Smokeless tobacco: Never Used    Alcohol use No    Drug use: Not on file    Sexual activity: Not on file     Review of Systems   Constitution: Negative for chills, fever, weakness and malaise/fatigue.   HENT: Negative for headaches and nosebleeds.    Eyes: Negative for double vision, vision loss in left eye and vision loss in right  eye.   Cardiovascular: Positive for claudication (pain right foot with minimal activities) and leg swelling. Negative for chest pain, dyspnea on exertion, irregular heartbeat, near-syncope, orthopnea, palpitations, paroxysmal nocturnal dyspnea and syncope.   Respiratory: Negative for cough, hemoptysis, shortness of breath and wheezing.    Endocrine: Negative for cold intolerance and heat intolerance.   Hematologic/Lymphatic: Negative for bleeding problem. Does not bruise/bleed easily.   Skin: Negative for color change and rash.   Musculoskeletal: Negative for back pain, falls, muscle weakness and myalgias.   Gastrointestinal: Negative for heartburn, hematemesis, hematochezia, hemorrhoids, jaundice, melena, nausea and vomiting.   Genitourinary: Negative for dysuria and hematuria.   Neurological: Negative for dizziness, focal weakness, light-headedness, loss of balance, numbness and vertigo.   Psychiatric/Behavioral: Negative for altered mental status, depression and memory loss. The patient is not nervous/anxious.    Allergic/Immunologic: Negative for hives and persistent infections.     Objective:     Vital Signs (Most Recent):  Temp: 98.4 °F (36.9 °C) (05/03/17 1825)  Pulse: 81 (05/03/17 1825)  Resp: 18 (05/03/17 1825)  BP: 136/80 (05/03/17 1825)  SpO2: 98 % (05/03/17 1825) Vital Signs (24h Range):  Temp:  [98.4 °F (36.9 °C)] 98.4 °F (36.9 °C)  Pulse:  [77-81] 81  Resp:  [18] 18  SpO2:  [98 %] 98 %  BP: (136-139)/(68-80) 136/80     Weight: 88 kg (194 lb)  Body mass index is 28.24 kg/(m^2).    SpO2: 98 %  O2 Device (Oxygen Therapy): room air    No intake or output data in the 24 hours ending 05/03/17 1955    Lines/Drains/Airways     Drain                 Closed/Suction Drain 03/04/16 1501 Right Thigh Bulb 15 Fr. 425 days         Closed/Suction Drain 03/04/16 1528 Right Leg Bulb 15 Fr. 425 days                Physical Exam   Constitutional: He is oriented to person, place, and time. He appears well-developed and  well-nourished.  Non-toxic appearance. No distress.   HENT:   Head: Normocephalic and atraumatic.   Nose: Nose normal.   Eyes: Right eye exhibits no discharge. Left eye exhibits no discharge. Right conjunctiva is not injected. Left conjunctiva is not injected. Right pupil is round. Left pupil is round. Pupils are equal.   Neck: Neck supple. No JVD present. Carotid bruit is not present. No thyromegaly present.   Cardiovascular: Normal rate, regular rhythm, S1 normal and S2 normal.   No extrasystoles are present. PMI is not displaced.  Exam reveals gallop and S4.    Pulses:       Radial pulses are 2+ on the right side, and 2+ on the left side.        Femoral pulses are 2+ on the right side, and 2+ on the left side.       Dorsalis pedis pulses are 0 on the left side.        Posterior tibial pulses are 0 on the left side.   Right foot with decreased temperature.   Pulmonary/Chest: Effort normal and breath sounds normal.   Abdominal: Soft. Normal appearance. There is no hepatosplenomegaly. There is no tenderness.   Musculoskeletal:        Right ankle: He exhibits no swelling, no ecchymosis and no deformity.        Left ankle: He exhibits no swelling, no ecchymosis and no deformity.   Lymphadenopathy:        Head (right side): No submandibular adenopathy present.        Head (left side): No submandibular adenopathy present.     He has no cervical adenopathy.   Neurological: He is alert and oriented to person, place, and time. He is not disoriented. No cranial nerve deficit.   Skin: Skin is warm, dry and intact. No rash noted. He is not diaphoretic. No pallor.   Psychiatric: He has a normal mood and affect. His speech is normal and behavior is normal. Judgment and thought content normal. Cognition and memory are normal.     Current Medications:     [START ON 5/4/2017] allopurinol  100 mg Oral Daily    [START ON 5/4/2017] amlodipine  10 mg Oral Daily    [START ON 5/4/2017] aspirin  81 mg Oral Daily    [START ON  5/4/2017] atorvastatin  40 mg Oral Daily    [START ON 5/4/2017] benazepril  20 mg Oral Daily    carvedilol  6.25 mg Oral BID    heparin (PORCINE)  70 Units/kg Intravenous Once    heparin (porcine) in 5 % dex  18 Units/kg/hr Intravenous ED 1 Time    [START ON 5/4/2017] pantoprazole  40 mg Oral Daily    [START ON 5/4/2017] tamsulosin  0.4 mg Oral Daily    [START ON 5/4/2017] vitamin D  2,000 Units Oral Daily     Current Laboratory Results:    No results found for this or any previous visit (from the past 24 hour(s)).  Current Imaging Results:    Imaging Results     None            Assessment and Plan:     1. Peripheral Artery Disease              2014: Began experience bilateral calf claudication.                1/2016: [facility]: Arterial Duplex: Right: SFA: Distal severe. RAVI 0.78. Left: SFA: Mid occlusion. RAVI 0.65.              2/1/2016: Began experience pain in right great toe.              12/1/2015: Chol 192. HDL 36. . .              3/2/2016: [facility]: AF: Right: SFA: Lengthy occlusion. AT: Occluded. PT & PER: Patent. Left: LCI: 40%. LCF: 60%. SFA: Lengthy occlusion. AT: Occluded. PT & PER: Appear patent.              3/4/2016: [facility]: Right fem-pop bypass using reversed saphenous vein.              Tip of great toe recovered.              On atorvastatin 40 mg QD.              8/31/2016: Chol 144. HDL 37. LDL 85. .              Lipid panel favorable.              On aspirin 81 mg Q24.              4/28/2017: Recurrent pain in right foot.              Probably occluded graft.              Admission and angiogram.   Heparin iv.              Dr. Aris Finney.      2. Leg Edema              Of right leg after surgery.              Uses pressure stocking.      3. Hypertension              2012: Diagnosed.              On carvedilol 6.25 mg Q12, benazepril 20 mg Q24 and amlodipine 10 mg Q24.              Keeping log at home.              Appears well controlled.      4. History of Tobacco Use               1970: Began smoking.               3/1/2016: Quit smoking.              Stressed importance of not starting again.     5. Chronic Kidney Disease, Stage 3              12/10/2015: BUN/crea 32/1.63. CrCl 43.              Dr. Castillo Cedeno.     6. Primary Care              Dr. Conor Olivares.      VTE Risk Mitigation         Ordered     Medium Risk of VTE  Once      05/03/17 2002     Reason for No Pharmacological VTE Prophylaxis  Once      05/03/17 2002          Phoenix Ayers MD  Cardiology   [facility]

## 2017-05-04 NOTE — BRIEF OP NOTE
5/4/2017: [facility]: Angio: Right: SFA occluded. Fem-Pop graft occluded. PT patent.    Phoenix Ayers M.D.

## 2017-05-04 NOTE — ED TRIAGE NOTES
"LOC: The patient is awake, alert and aware of environment with an appropriate affect, the patient is oriented x 3 and speaking appropriately.  APPEARANCE: Patient resting comfortably and in no acute distress, patient is clean and well groomed, patient's clothing is properly fastened.  SKIN: The skin is warm and dry, color consistent with ethnicity, patient has normal skin turgor and moist mucus membranes, skin intact, no breakdown or bruising noted. Right foot is cool-to-touch  MUSCULOSKELETAL: Patient moving all extremities, no deformities noted  RESPIRATORY: Airway is open and patent, respirations are spontaneous, patient has a normal effort and rate, no accessory muscle use noted.  CARDIAC: Patient has a normal rate and rhythm, periphreal edema to the right foot noted, no pedal pulse in the right foot. +pedal pulse with doppler in the left foot.  ABDOMEN: Soft and non tender to palpation, no distention noted.  NEUROLOGIC: PERRL, eyes open spontaneously, behavior appropriate to situation, follows commands, facial expression symmetrical, bilateral hand grasp equal and even, purposeful motor response noted, normal sensation in all extremities when touched with a finger.  Pt c/o right "shin splint pain" that goes from his foot up to his knee. Pt states it started after he took a long car ride.   Bed in low locked position.  Call light in reach.    "

## 2017-05-04 NOTE — PLAN OF CARE
05/04/17 1012   ED Admissions Case Approval   ED Admissions Case Approval (!) CM Approved  (IP )

## 2017-05-04 NOTE — PROGRESS NOTES
Pt arrived to unit at approximately 0000. Heparin drip infusing. Received a call from lab that pt's aPTT was greater than 150.  notified. He ordered that heparin drip be stopped for 2 hours and then to lower pt's dosage by 3 units per heparin nomogram. Will continue to monitor.  Purposeful hourly rounding completed.

## 2017-05-05 VITALS
BODY MASS INDEX: 27.77 KG/M2 | HEART RATE: 66 BPM | RESPIRATION RATE: 18 BRPM | DIASTOLIC BLOOD PRESSURE: 59 MMHG | OXYGEN SATURATION: 97 % | HEIGHT: 70 IN | WEIGHT: 194 LBS | SYSTOLIC BLOOD PRESSURE: 128 MMHG | TEMPERATURE: 98 F

## 2017-05-05 LAB
ANION GAP SERPL CALC-SCNC: 6 MMOL/L
BASOPHILS # BLD AUTO: 0.03 K/UL
BASOPHILS NFR BLD: 0.6 %
BUN SERPL-MCNC: 25 MG/DL
CALCIUM SERPL-MCNC: 8.1 MG/DL
CHLORIDE SERPL-SCNC: 113 MMOL/L
CO2 SERPL-SCNC: 21 MMOL/L
CREAT SERPL-MCNC: 1.5 MG/DL
DIFFERENTIAL METHOD: ABNORMAL
EOSINOPHIL # BLD AUTO: 0.2 K/UL
EOSINOPHIL NFR BLD: 3 %
ERYTHROCYTE [DISTWIDTH] IN BLOOD BY AUTOMATED COUNT: 15.2 %
EST. GFR  (AFRICAN AMERICAN): 54 ML/MIN/1.73 M^2
EST. GFR  (NON AFRICAN AMERICAN): 47 ML/MIN/1.73 M^2
GLUCOSE SERPL-MCNC: 72 MG/DL
HCT VFR BLD AUTO: 38.7 %
HGB BLD-MCNC: 11.3 G/DL
LYMPHOCYTES # BLD AUTO: 1.5 K/UL
LYMPHOCYTES NFR BLD: 29.9 %
MCH RBC QN AUTO: 30.5 PG
MCHC RBC AUTO-ENTMCNC: 29.2 %
MCV RBC AUTO: 104 FL
MONOCYTES # BLD AUTO: 0.5 K/UL
MONOCYTES NFR BLD: 10.6 %
NEUTROPHILS # BLD AUTO: 2.8 K/UL
NEUTROPHILS NFR BLD: 55.7 %
PLATELET # BLD AUTO: 116 K/UL
PMV BLD AUTO: 12.6 FL
POC ACTIVATED CLOTTING TIME K: 157 SEC (ref 74–137)
POTASSIUM SERPL-SCNC: 4.8 MMOL/L
RBC # BLD AUTO: 3.71 M/UL
SAMPLE: ABNORMAL
SODIUM SERPL-SCNC: 140 MMOL/L
WBC # BLD AUTO: 5.08 K/UL

## 2017-05-05 PROCEDURE — 25000003 PHARM REV CODE 250: Performed by: EMERGENCY MEDICINE

## 2017-05-05 PROCEDURE — 85025 COMPLETE CBC W/AUTO DIFF WBC: CPT

## 2017-05-05 PROCEDURE — 25000003 PHARM REV CODE 250: Performed by: INTERNAL MEDICINE

## 2017-05-05 PROCEDURE — 80048 BASIC METABOLIC PNL TOTAL CA: CPT

## 2017-05-05 PROCEDURE — 36415 COLL VENOUS BLD VENIPUNCTURE: CPT

## 2017-05-05 RX ORDER — CLOPIDOGREL BISULFATE 75 MG/1
300 TABLET ORAL ONCE
Status: COMPLETED | OUTPATIENT
Start: 2017-05-05 | End: 2017-05-05

## 2017-05-05 RX ORDER — ASPIRIN 81 MG/1
81 TABLET ORAL DAILY
Qty: 90 TABLET | Refills: 3 | Status: SHIPPED | OUTPATIENT
Start: 2017-05-05 | End: 2019-04-26

## 2017-05-05 RX ORDER — CLOPIDOGREL BISULFATE 75 MG/1
75 TABLET ORAL DAILY
Qty: 90 TABLET | Refills: 0 | Status: SHIPPED | OUTPATIENT
Start: 2017-05-05 | End: 2017-08-07 | Stop reason: SDUPTHER

## 2017-05-05 RX ADMIN — AMLODIPINE BESYLATE 10 MG: 5 TABLET ORAL at 09:05

## 2017-05-05 RX ADMIN — TAMSULOSIN HYDROCHLORIDE 0.4 MG: 0.4 CAPSULE ORAL at 09:05

## 2017-05-05 RX ADMIN — PANTOPRAZOLE SODIUM 40 MG: 40 TABLET, DELAYED RELEASE ORAL at 09:05

## 2017-05-05 RX ADMIN — ALLOPURINOL 100 MG: 100 TABLET ORAL at 09:05

## 2017-05-05 RX ADMIN — ATORVASTATIN CALCIUM 40 MG: 20 TABLET, FILM COATED ORAL at 09:05

## 2017-05-05 RX ADMIN — ASPIRIN 81 MG: 81 TABLET, COATED ORAL at 09:05

## 2017-05-05 RX ADMIN — BENAZEPRIL HYDROCHLORIDE 20 MG: 10 TABLET, FILM COATED ORAL at 09:05

## 2017-05-05 RX ADMIN — CARVEDILOL 6.25 MG: 6.25 TABLET, FILM COATED ORAL at 09:05

## 2017-05-05 RX ADMIN — CLOPIDOGREL 300 MG: 75 TABLET, FILM COATED ORAL at 03:05

## 2017-05-05 NOTE — PROGRESS NOTES
[facility]  Cardiology  Progress Note    Patient Name: Brooks Canas  MRN: [id number]  Admission Date: 5/3/2017  Hospital Length of Stay: 2 days  Code Status: Full Code   Attending Physician: Phoenix Ayers MD   Primary Care Physician: Conor Olivares MD  Expected Discharge Date:   Principal Problem:Atherosclerosis of artery of extremity with rest pain    Subjective:     Brief HPI:    Brooks Canas is a 67 y.o. male patient with hypertension who has been a smoker since 1970 but been unable to quit until 3/1/2016. He developed left calf claudication in 2014. In the beginning of 2/2016 he began experience pain in his right great toe that then slowly got more pronounced and later began aching at night. An Arterial Duplex study on 1/8/2016 suggested bilateral severe SFA disease. He was referred for urgent angiography that was performed on 3/2/2016 revealing bilateral SFA occlusions with two vessel runoff. On 3/4/2016 he underwent fem-pop bypass using a reverse saphenous vein. The right foot became warm and the rest pain resolved. The color of the right toe improved and healed. There has been no left calf claudication. On 4/28/2017 when visiting his sister in [place] he got sudden onset of pain in the right foot and he has since gotten pain in the right leg with minimal activities but no pain at rest. No exertional chest pain or exertional dyspnea. No palpitations or weak spell. Blood pressure been running fine. Feeling fair overall.     Hospital Course:     5/4/2017: [facility] [place]: Angio: Right: SFA occluded. Fem-Pop graft occluded. PT patent.    Interval History:     Denies rest pain. Been ambulating on floor without any clear pain. Wants to try conservative management.    Review of Systems   Constitution: Negative for chills, fever, weakness and malaise/fatigue.   HENT: Negative for headaches, hoarse voice and nosebleeds.    Eyes: Negative for double vision, vision loss in left eye and  vision loss in right eye.   Cardiovascular: Positive for claudication. Negative for chest pain, dyspnea on exertion, irregular heartbeat, leg swelling, near-syncope, orthopnea, palpitations, paroxysmal nocturnal dyspnea and syncope.   Respiratory: Negative for cough, hemoptysis, shortness of breath and wheezing.    Endocrine: Negative for cold intolerance and heat intolerance.   Hematologic/Lymphatic: Negative for bleeding problem. Does not bruise/bleed easily.   Skin: Negative for color change and rash.   Musculoskeletal: Negative for back pain, falls, muscle weakness and myalgias.   Gastrointestinal: Negative for heartburn, hematemesis, hematochezia, hemorrhoids, jaundice, melena, nausea and vomiting.   Genitourinary: Negative for dysuria and hematuria.   Neurological: Negative for dizziness, focal weakness, light-headedness, loss of balance, numbness and vertigo.   Psychiatric/Behavioral: Negative for altered mental status, depression and memory loss. The patient is not nervous/anxious.    Allergic/Immunologic: Negative for hives and persistent infections.     Objective:     Vital Signs (Most Recent):  Temp: 98 °F (36.7 °C) (05/05/17 1100)  Pulse: 66 (05/05/17 1100)  Resp: 18 (05/05/17 1100)  BP: (!) 128/59 (05/05/17 1100)  SpO2: 97 % (05/05/17 1100) Vital Signs (24h Range):  Temp:  [98 °F (36.7 °C)-98.8 °F (37.1 °C)] 98 °F (36.7 °C)  Pulse:  [57-66] 66  Resp:  [16-20] 18  SpO2:  [96 %-97 %] 97 %  BP: (126-156)/(59-73) 128/59     Weight: 88 kg (194 lb)  Body mass index is 28.24 kg/(m^2).    SpO2: 97 %  O2 Device (Oxygen Therapy): room air      Intake/Output Summary (Last 24 hours) at 05/05/17 1532  Last data filed at 05/05/17 1100   Gross per 24 hour   Intake             2736 ml   Output             1650 ml   Net             1086 ml       Lines/Drains/Airways     Drain                 Closed/Suction Drain 03/04/16 1501 Right Thigh Bulb 15 Fr. 427 days         Closed/Suction Drain 03/04/16 1528 Right Leg Bulb 15  Fr. 427 days          Peripheral Intravenous Line                 Peripheral IV - Single Lumen 05/03/17 1920 Left Antecubital 1 day         Peripheral IV - Single Lumen 05/03/17 2015 Right Hand 1 day                Physical Exam   Constitutional: He is oriented to person, place, and time. He appears well-developed and well-nourished.  Non-toxic appearance. No distress.   HENT:   Head: Normocephalic and atraumatic.   Nose: Nose normal.   Eyes: Right eye exhibits no discharge. Left eye exhibits no discharge. Right conjunctiva is not injected. Left conjunctiva is not injected. Right pupil is round. Left pupil is round. Pupils are equal.   Neck: Neck supple. No JVD present. Carotid bruit is not present. No thyromegaly present.   Cardiovascular: Normal rate, regular rhythm, S1 normal and S2 normal.   No extrasystoles are present. PMI is not displaced.  Exam reveals gallop and S4.    No murmur heard.  Pulses:       Radial pulses are 2+ on the right side, and 2+ on the left side.        Femoral pulses are 2+ on the right side, and 2+ on the left side.       Dorsalis pedis pulses are 0 on the right side, and 1+ on the left side.        Posterior tibial pulses are 0 on the right side, and 1+ on the left side.   Pulmonary/Chest: Effort normal and breath sounds normal.   Abdominal: Soft. Normal appearance. There is no hepatosplenomegaly. There is no tenderness.   Musculoskeletal:        Left hip: He exhibits no tenderness.        Right ankle: He exhibits swelling. He exhibits no ecchymosis and no deformity.        Left ankle: He exhibits no swelling, no ecchymosis and no deformity.   Lymphadenopathy:        Head (right side): No submandibular adenopathy present.        Head (left side): No submandibular adenopathy present.     He has no cervical adenopathy.   Neurological: He is alert and oriented to person, place, and time. He is not disoriented. No cranial nerve deficit.   Skin: Skin is warm, dry and intact. No rash noted.  He is not diaphoretic. No cyanosis. Nails show no clubbing.   Psychiatric: He has a normal mood and affect. His speech is normal and behavior is normal. Judgment and thought content normal. Cognition and memory are normal.     Current Medications:     allopurinol  100 mg Oral Daily    amlodipine  10 mg Oral Daily    aspirin  81 mg Oral Daily    atorvastatin  40 mg Oral Daily    benazepril  20 mg Oral Daily    carvedilol  6.25 mg Oral BID    pantoprazole  40 mg Oral Daily    tamsulosin  0.4 mg Oral Daily    vitamin D  2,000 Units Oral Daily     Current Laboratory Results:    Recent Results (from the past 24 hour(s))   CBC auto differential    Collection Time: 05/05/17 12:02 AM   Result Value Ref Range    WBC 5.08 3.90 - 12.70 K/uL    RBC 3.71 (L) 4.60 - 6.20 M/uL    Hemoglobin 11.3 (L) 14.0 - 18.0 g/dL    Hematocrit 38.7 (L) 40.0 - 54.0 %     (H) 82 - 98 fL    MCH 30.5 27.0 - 31.0 pg    MCHC 29.2 (L) 32.0 - 36.0 %    RDW 15.2 (H) 11.5 - 14.5 %    Platelets 116 (L) 150 - 350 K/uL    MPV 12.6 9.2 - 12.9 fL    Gran # 2.8 1.8 - 7.7 K/uL    Lymph # 1.5 1.0 - 4.8 K/uL    Mono # 0.5 0.3 - 1.0 K/uL    Eos # 0.2 0.0 - 0.5 K/uL    Baso # 0.03 0.00 - 0.20 K/uL    Gran% 55.7 38.0 - 73.0 %    Lymph% 29.9 18.0 - 48.0 %    Mono% 10.6 4.0 - 15.0 %    Eosinophil% 3.0 0.0 - 8.0 %    Basophil% 0.6 0.0 - 1.9 %    Differential Method Automated    Basic metabolic panel    Collection Time: 05/05/17  4:10 AM   Result Value Ref Range    Sodium 140 136 - 145 mmol/L    Potassium 4.8 3.5 - 5.1 mmol/L    Chloride 113 (H) 95 - 110 mmol/L    CO2 21 (L) 23 - 29 mmol/L    Glucose 72 70 - 110 mg/dL    BUN, Bld 25 (H) 8 - 23 mg/dL    Creatinine 1.5 (H) 0.5 - 1.4 mg/dL    Calcium 8.1 (L) 8.7 - 10.5 mg/dL    Anion Gap 6 (L) 8 - 16 mmol/L    eGFR if African American 54 (A) >60 mL/min/1.73 m^2    eGFR if non African American 47 (A) >60 mL/min/1.73 m^2     Current Imaging Results:    Imaging Results         US Lower Extremity Arteries Right  (Final result)    Abnormal Result time:  05/03/17 22:14:45    Final result by Anthony Meneses MD (05/03/17 22:14:45)    Impression:        1. Proximal right lower leg bypass from the CFA to popliteal artery, with near-total occlusion.    2. High-grade stenosis between the distal right SFA and proximal right popliteal artery, with resultant more distal monophasic tardus parvus waveforms. No arterial occlusion within the interrogated native arterial system.        EPIC notification system was activated.        Electronically signed by: ANTHONY MENESES MD, MD  Date:     05/03/17  Time:    22:14     Narrative:    COMPARISON: None    TECHNIQUE: Gray scale and color Doppler evaluation was performed of the right lower extremity arterial system.    FINDINGS:   There is a bypass connecting the distal right common femoral and popliteal veins. There is minimal arterial flow identified within the bypass along its entire length, beginning at the proximal anastomosis/origin. No discreet fluid collections.    Right CFA peak systolic velocity: 160 cm/sec with biphasic waveforms.  Proximal right SFA peak systolic velocity: 50 cm/sec with monophasic waveforms.  Mid right SFA peak systolic velocity: 61 cm/sec with monophasic waveforms.  Distal right SFA peak systolic velocity: 23 cm/sec with monophasic waveforms.  Right popliteal artery peak systolic velocity: 71 cm/sec with monophasic, tardus parvus waveforms.  Right peroneal artery peak systolic velocity: 28 cm/s with monophasic tardus parvus waveforms.  Right anterior tibial artery peak systolic velocity: 29 cm/sec with monophasic tardus parvus waveforms.  Right posterior tibial artery peak systolic velocity: 33 cm/sec with monophasic tardus parvus waveforms.  Right dorsalis pedis peak systolic velocity: 80 cm/sec with a monophasic tardus parvus waveforms.              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 Peripheral Artery Disease              2014: Began experience bilateral calf claudication.                1/2016: [facility]: Arterial Duplex: Right: SFA: Distal severe. RAVI 0.78. Left: SFA: Mid occlusion. RAVI 0.65.              2/1/2016: Began experience pain in right great toe.              12/1/2015: Chol 192. HDL 36. . .              3/2/2016: [facility]: AF: Right: SFA: Lengthy occlusion. AT: Occluded. PT & PER: Patent. Left: LCI: 40%. LCF: 60%. SFA: Lengthy occlusion. AT: Occluded. PT & PER: Appear patent.              3/4/2016: [facility]: Right fem-pop bypass using reversed saphenous vein.              Tip of great toe recovered.              On atorvastatin 40 mg QD.              8/31/2016: Chol 144. HDL 37. LDL 85. .              Lipid panel favorable.              On aspirin 81 mg Q24.              4/28/2017: Recurrent pain in right foot.   5/4/2017: [facility]: Angio: Right: SFA occluded. Fem-Pop graft occluded. PT patent.   Wants to try conservative management.   Will add clopidogrel 75 mg Q24 until 8/2017.              Dr. Aris Finney.     2. Leg Edema              Right leg after surgery that used vein.              Uses pressure stocking.     3. Hypertension              2012: Diagnosed.              On carvedilol 6.25 mg Q12, benazepril 20 mg Q24 and amlodipine 10 mg Q24.              Keeping log at home.              Appears well controlled.     4. History of Tobacco Use              1970: Began smoking.               3/1/2016: Quit smoking.               Stressed importance of not starting again.     5. Chronic Kidney Disease, Stage 3              12/10/2015: BUN/crea 32/1.63. CrCl 43.              Dr. Castillo Cedeno.     6. Primary Care              Dr. Conor Olivares.        VTE Risk Mitigation         Ordered     Medium Risk of VTE  Once      05/03/17 2002     Reason for No Pharmacological VTE Prophylaxis  Once      05/03/17 2002          Phoenix Ayers MD  Cardiology  [facility]

## 2017-05-05 NOTE — PLAN OF CARE
DC Dispo- Patient to DC home with self care, patient to follow up with Dr. Arce. No needs expressed, all questions answered. CM to remain supportive.     05/05/17 1651   Final Note   Assessment Type Final Discharge Note   Discharge Disposition Home   Discharge planning education complete? Yes   Hospital Follow Up  Appt(s) scheduled? Yes   Discharge plans and expectations educations in teach back method with documentation complete? Yes   Offered [facility] -- Bedside Delivery? n/a   Discharge[facility]Hospital En[facility] n/a   Referral to Outpatient Case Management complete? n/a   Referral to / orders for Home Health Complete? n/a   30 day supply of medicines given at discharge, if documented non-compliance / non-adherence? n/a   Any social issues identified prior to discharge? n/a   Did you assess the readiness or willingness of the family or caregiver to support self management of care? Yes

## 2017-05-05 NOTE — DISCHARGE INSTRUCTIONS
Aspirin, ASA oral tablets  What is this medicine?  ASPIRIN (AS pir in) is a pain reliever. It is used to treat mild pain and fever. This medicine is also used as directed by a doctor to prevent and to treat heart attacks, to prevent strokes, and to treat arthritis or inflammation.  How should I use this medicine?  Take this medicine by mouth with a glass of water. Follow the directions on the package or prescription label. You can take this medicine with or without food. If it upsets your stomach, take it with food. Do not take your medicine more often than directed.  Talk to your pediatrician regarding the use of this medicine in children. While this drug may be prescribed for children as young as 12 years of age for selected conditions, precautions do apply. Children and teenagers should not use this medicine to treat chicken pox or flu symptoms unless directed by a doctor.  Patients over 65 years old may have a stronger reaction and need a smaller dose.  What side effects may I notice from receiving this medicine?  Side effects that you should report to your doctor or health care professional as soon as possible:  · allergic reactions like skin rash, itching or hives, swelling of the face, lips, or tongue  · breathing problems  · changes in hearing, ringing in the ears  · confusion  · general ill feeling or flu-like symptoms  · pain on swallowing  · redness, blistering, peeling or loosening of the skin, including inside the mouth or nose  · signs and symptoms of bleeding such as bloody or black, tarry stools; red or dark-brown urine; spitting up blood or brown material that looks like coffee grounds; red spots on the skin; unusual bruising or bleeding from the eye, gums, or nose  · trouble passing urine or change in the amount of urine  · unusually weak or tired  · yellowing of the eyes or skin  Side effects that usually do not require medical attention (report to your doctor or health care professional if they  continue or are bothersome):  · diarrhea or constipation  · headache  · nausea, vomiting  · stomach gas, heartburn  What may interact with this medicine?  Do not take this medicine with any of the following medications:  · cidofovir  · ketorolac  · probenecid  This medicine may also interact with the following medications:  · alcohol  · alendronate  · bismuth subsalicylate  · flavocoxid  · herbal supplements like feverfew, garlic, zaira, ginkgo biloba, horse chestnut  · medicines for diabetes or glaucoma like acetazolamide, methazolamide  · medicines for gout  · medicines that treat or prevent blood clots like enoxaparin, heparin, ticlopidine, warfarin  · other aspirin and aspirin-like medicines  · NSAIDs, medicines for pain and inflammation, like ibuprofen or naproxen  · pemetrexed  · sulfinpyrazone  · varicella live vaccine  What if I miss a dose?  If you are taking this medicine on a regular schedule and miss a dose, take it as soon as you can. If it is almost time for your next dose, take only that dose. Do not take double or extra doses.  Where should I keep my medicine?  Keep out of the reach of children.  Store at room temperature between 15 and 30 degrees C (59 and 86 degrees F). Protect from heat and moisture. Do not use this medicine if it has a strong vinegar smell. Throw away any unused medicine after the expiration date.  What should I tell my health care provider before I take this medicine?  They need to know if you have any of these conditions:  · anemia  · asthma  · bleeding problems  · child with chickenpox, the flu, or other viral infection  · diabetes  · gout  · if you frequently drink alcohol containing drinks  · kidney disease  · liver disease  · low level of vitamin K  · lupus  · smoke tobacco  · stomach ulcers or other problems  · an unusual or allergic reaction to aspirin, tartrazine dye, other medicines, dyes, or preservatives  · pregnant or trying to get pregnant  · breast-feeding  What  should I watch for while using this medicine?  If you are treating yourself for pain, tell your doctor or health care professional if the pain lasts more than 10 days, if it gets worse, or if there is a new or different kind of pain. Tell your doctor if you see redness or swelling. Also, check with your doctor if you have a fever that lasts for more than 3 days. Only take this medicine to prevent heart attacks or blood clotting if prescribed by your doctor or health care professional.  Do not take aspirin or aspirin-like medicines with this medicine. Too much aspirin can be dangerous. Always read the labels carefully.  This medicine can irritate your stomach or cause bleeding problems. Do not smoke cigarettes or drink alcohol while taking this medicine. Do not lie down for 30 minutes after taking this medicine to prevent irritation to your throat.  If you are scheduled for any medical or dental procedure, tell your healthcare provider that you are taking this medicine. You may need to stop taking this medicine before the procedure.  This medicine may be used to treat migraines. If you take migraine medicines for 10 or more days a month, your migraines may get worse. Keep a diary of headache days and medicine use. Contact your healthcare professional if your migraine attacks occur more frequently.  Date Last Reviewed:   NOTE:This sheet is a summary. It may not cover all possible information. If you have questions about this medicine, talk to your doctor, pharmacist, or health care provider. Copyright© 2016 [employer]        Clopidogrel Bisulfate Oral tablet  What is this medicine?  CLOPIDOGREL (kloh PID oh grel) helps to prevent blood clots. This medicine is used to prevent heart attack, stroke, or other vascular events in people who are at high risk.  How should I use this medicine?  Take this medicine by mouth with a drink of water. Follow the directions on the prescription label. You may take this medicine with  or without food. Take your medicine at regular intervals. Do not take your medicine more often than directed.  Talk to your pediatrician regarding the use of this medicine in children. Special care may be needed.  What side effects may I notice from receiving this medicine?  Side effects that you should report to your doctor or health care professional as soon as possible:  · allergic reactions like skin rash, itching or hives, swelling of the face, lips, or tongue  · breathing problems  · changes in vision  · fever  · signs and symptoms of bleeding such as bloody or black, tarry stools; red or dark-brown urine; spitting up blood or brown material that looks like coffee grounds; red spots on the skin; unusual bruising or bleeding from the eye, gums, or nose  · sudden weakness  · unusual bleeding or bruising  Side effects that usually do not require medical attention (report to your doctor or health care professional if they continue or are bothersome):  · constipation or diarrhea  · headache  · pain in back or joints  · stomach upset  What may interact with this medicine?  · aspirin  · blood thinners like cilostazol, enoxaparin, ticlopidine, and warfarin  · certain medicines for depression like citalopram, fluoxetine, and fluvoxamine  · certain medicines for fungal infections like ketoconazole, fluconazole, and voriconazole  · certain medicines for HIV infection like delavirdine, efavirenz, and etravirine  · certain medicines for seizures like felbamate, oxcarbazepine, and phenytoin  · chloramphenicol  · fluvastatin  · isoniazid, INH  · medicines for inflammation like ibuprofen and naproxen  · modafinil  · nicardipine  · over-the counter supplements like echinacea, feverfew, fish oil, garlic, zaira, ginkgo, green tea, horse chestnut  · quinine  · stomach acid blockers like cimetidine, omeprazole, and esomeprazole  · tamoxifen  · tolbutamide  · topiramate  · torsemide  What if I miss a dose?  If you miss a dose,  take it as soon as you can. If it is almost time for your next dose, take only that dose. Do not take double or extra doses.  Where should I keep my medicine?  Keep out of the reach of children.  Store at room temperature of 59 to 86 degrees F (15 to 30 degrees C). Throw away any unused medicine after the expiration date.  What should I tell my health care provider before I take this medicine?  They need to know if you have any of the following conditions:  · bleeding disorder  · bleeding in the brain  · planned surgery  · stomach or intestinal ulcers  · stroke or transient ischemic attack  · an unusual or allergic reaction to clopidogrel, other medicines, foods, dyes, or preservatives  · pregnant or trying to get pregnant  · breast-feeding  What should I watch for while using this medicine?  Visit your doctor or health care professional for regular check ups. Do not stop taking your medicine unless your doctor tells you to.  Notify your doctor or health care professional and seek emergency treatment if you develop breathing problems; changes in vision; chest pain; severe, sudden headache; pain, swelling, warmth in the leg; trouble speaking; sudden numbness or weakness of the face, arm or leg. These can be signs that your condition has gotten worse.  If you are going to have surgery or dental work, tell your doctor or health care professional that you are taking this medicine.  Certain genetic factors may reduce the effect of this medicine. Your doctor may use genetic tests to determine treatment.  Date Last Reviewed:   NOTE:This sheet is a summary. It may not cover all possible information. If you have questions about this medicine, talk to your doctor, pharmacist, or health care provider. Copyright© 2016 [employer]      Step-by-Step:  Inspecting Your Feet (PAD)    Date Last Reviewed: 10/1/2016  © 0658-3480 [employer]. [address] [place] [address], [place] [address]. All rights reserved. This  information is not intended as a substitute for professional medical care. Always follow your healthcare professional's instructions.

## 2017-05-05 NOTE — PROGRESS NOTES
[facility]  Cardiology  Progress Note    Patient Name: Brooks Canas  MRN: [id number]  Admission Date: 5/3/2017  Hospital Length of Stay: 1 days  Code Status: Full Code   Attending Physician: Phoenix Ayers MD   Primary Care Physician: Conor Olivares MD  Expected Discharge Date:   Principal Problem:Atherosclerosis of artery of extremity with rest pain    Subjective:     Brief HPI:    Brooks Canas is a 67 y.o. male patient with hypertension who has been a smoker since 1970 but been unable to quit until 3/1/2016. He developed left calf claudication in 2014. In the beginning of 2/2016 he began experience pain in his right great toe that then slowly got more pronounced and later began aching at night. An Arterial Duplex study on 1/8/2016 suggested bilateral severe SFA disease. He was referred for urgent angiography that was performed on 3/2/2016 revealing bilateral SFA occlusions with two vessel runoff. On 3/4/2016 he underwent fem-pop bypass using a reverse saphenous vein. The right foot became warm and the rest pain resolved. The color of the right toe improved and healed. There has been no left calf claudication. On 4/28/2017 when visiting his sister in [place] he got sudden onset of pain in the right foot and he has since gotten pain in the right leg with minimal activities but no pain at rest. No exertional chest pain or exertional dyspnea. No palpitations or weak spell. Blood pressure been running fine. Feeling fair overall.     Hospital Course:     5/4/2017: [facility] [place] [facility]: Angio: Right: SFA occluded. Fem-Pop graft occluded. PT patent.    Interval History:     Denies rest pain. Wants to try conservative management.    Review of Systems   Constitution: Negative for chills, fever, weakness and malaise/fatigue.   HENT: Negative for headaches, hoarse voice and nosebleeds.    Eyes: Negative for double vision, vision loss in left eye and vision loss in right eye.   Cardiovascular: Positive  for claudication. Negative for chest pain, dyspnea on exertion, irregular heartbeat, leg swelling, near-syncope, orthopnea, palpitations, paroxysmal nocturnal dyspnea and syncope.   Respiratory: Negative for cough, hemoptysis, shortness of breath and wheezing.    Endocrine: Negative for cold intolerance and heat intolerance.   Hematologic/Lymphatic: Negative for bleeding problem. Does not bruise/bleed easily.   Skin: Negative for color change and rash.   Musculoskeletal: Negative for back pain, falls, muscle weakness and myalgias.   Gastrointestinal: Negative for heartburn, hematemesis, hematochezia, hemorrhoids, jaundice, melena, nausea and vomiting.   Genitourinary: Negative for dysuria and hematuria.   Neurological: Negative for dizziness, focal weakness, light-headedness, loss of balance, numbness and vertigo.   Psychiatric/Behavioral: Negative for altered mental status, depression and memory loss. The patient is not nervous/anxious.    Allergic/Immunologic: Negative for hives and persistent infections.     Objective:     Vital Signs (Most Recent):  Temp: 98.1 °F (36.7 °C) (05/04/17 1500)  Pulse: (!) 58 (05/04/17 1500)  Resp: 18 (05/04/17 1500)  BP: (!) 102/56 (05/04/17 1500)  SpO2: 96 % (05/04/17 1500) Vital Signs (24h Range):  Temp:  [97.4 °F (36.3 °C)-98.2 °F (36.8 °C)] 98.1 °F (36.7 °C)  Pulse:  [51-74] 58  Resp:  [16-18] 18  SpO2:  [94 %-99 %] 96 %  BP: (102-154)/(50-71) 102/56     Weight: 88 kg (194 lb)  Body mass index is 28.24 kg/(m^2).    SpO2: 96 %  O2 Device (Oxygen Therapy): room air      Intake/Output Summary (Last 24 hours) at 05/04/17 1942  Last data filed at 05/04/17 1810   Gross per 24 hour   Intake             1926 ml   Output              650 ml   Net             1276 ml       Lines/Drains/Airways     Drain                 Closed/Suction Drain 03/04/16 1501 Right Thigh Bulb 15 Fr. 426 days         Closed/Suction Drain 03/04/16 1528 Right Leg Bulb 15 Fr. 426 days          Peripheral Intravenous  Line                 Peripheral IV - Single Lumen 05/03/17 1920 Left Antecubital 1 day         Peripheral IV - Single Lumen 05/03/17 2015 Right Hand less than 1 day                Physical Exam   Constitutional: He is oriented to person, place, and time. He appears well-developed and well-nourished.  Non-toxic appearance. No distress.   HENT:   Head: Normocephalic and atraumatic.   Nose: Nose normal.   Eyes: Right eye exhibits no discharge. Left eye exhibits no discharge. Right conjunctiva is not injected. Left conjunctiva is not injected. Right pupil is round. Left pupil is round. Pupils are equal.   Neck: Neck supple. No JVD present. Carotid bruit is not present. No thyromegaly present.   Cardiovascular: Normal rate, regular rhythm, S1 normal and S2 normal.   No extrasystoles are present. PMI is not displaced.  Exam reveals gallop and S4.    Pulses:       Radial pulses are 2+ on the right side, and 2+ on the left side.        Femoral pulses are 2+ on the right side, and 2+ on the left side.       Dorsalis pedis pulses are 0 on the right side, and 1+ on the left side.        Posterior tibial pulses are 0 on the right side, and 1+ on the left side.   Decreased temperature of right foot.   Pulmonary/Chest: Effort normal and breath sounds normal.   Abdominal: Soft. Normal appearance. There is no hepatosplenomegaly. There is no tenderness.   Musculoskeletal:        Left hip: He exhibits no tenderness.        Right ankle: He exhibits swelling. He exhibits no ecchymosis and no deformity.        Left ankle: He exhibits no swelling, no ecchymosis and no deformity.   Lymphadenopathy:        Head (right side): No submandibular adenopathy present.        Head (left side): No submandibular adenopathy present.     He has no cervical adenopathy.   Neurological: He is alert and oriented to person, place, and time. He is not disoriented. No cranial nerve deficit.   Skin: Skin is warm, dry and intact. No rash noted. He is not  diaphoretic. No cyanosis. Nails show no clubbing.   Psychiatric: He has a normal mood and affect. His speech is normal and behavior is normal. Judgment and thought content normal. Cognition and memory are normal.     Current Medications:     allopurinol  100 mg Oral Daily    amlodipine  10 mg Oral Daily    aspirin  81 mg Oral Daily    atorvastatin  40 mg Oral Daily    benazepril  20 mg Oral Daily    carvedilol  6.25 mg Oral BID    pantoprazole  40 mg Oral Daily    tamsulosin  0.4 mg Oral Daily    vitamin D  2,000 Units Oral Daily     Current Laboratory Results:    Recent Results (from the past 24 hour(s))   Basic metabolic panel    Collection Time: 05/04/17  2:46 AM   Result Value Ref Range    Sodium 139 136 - 145 mmol/L    Potassium 4.2 3.5 - 5.1 mmol/L    Chloride 111 (H) 95 - 110 mmol/L    CO2 21 (L) 23 - 29 mmol/L    Glucose 99 70 - 110 mg/dL    BUN, Bld 24 (H) 8 - 23 mg/dL    Creatinine 1.4 0.5 - 1.4 mg/dL    Calcium 8.3 (L) 8.7 - 10.5 mg/dL    Anion Gap 7 (L) 8 - 16 mmol/L    eGFR if African American 59 (A) >60 mL/min/1.73 m^2    eGFR if non African American 51 (A) >60 mL/min/1.73 m^2   Hematology Profile    Collection Time: 05/04/17  2:46 AM   Result Value Ref Range    WBC 5.31 3.90 - 12.70 K/uL    RBC 3.74 (L) 4.60 - 6.20 M/uL    Hemoglobin 11.5 (L) 14.0 - 18.0 g/dL    Hematocrit 33.5 (L) 40.0 - 54.0 %    MCV 90 82 - 98 fL    MCH 30.7 27.0 - 31.0 pg    MCHC 34.3 32.0 - 36.0 %    RDW 13.3 11.5 - 14.5 %    Platelets 123 (L) 150 - 350 K/uL    MPV 10.5 9.2 - 12.9 fL   APTT    Collection Time: 05/04/17  2:46 AM   Result Value Ref Range    aPTT >150.0 (AA) 21.0 - 32.0 sec   Cath lab procedure    Collection Time: 05/04/17 11:30 AM   Result Value Ref Range    Peripheral Stenosis >= 50% (A)      Current Imaging Results:    Imaging Results         US Lower Extremity Arteries Right (Final result)    Abnormal Result time:  05/03/17 22:14:45    Final result by Everardo Meneses MD (05/03/17 22:14:45)     Impression:        1. Proximal right lower leg bypass from the CFA to popliteal artery, with near-total occlusion.    2. High-grade stenosis between the distal right SFA and proximal right popliteal artery, with resultant more distal monophasic tardus parvus waveforms. No arterial occlusion within the interrogated native arterial system.        EPIC notification system was activated.        Electronically signed by: ANTHONY PLATT MD, MD  Date:     05/03/17  Time:    22:14     Narrative:    COMPARISON: None    TECHNIQUE: Gray scale and color Doppler evaluation was performed of the right lower extremity arterial system.    FINDINGS:   There is a bypass connecting the distal right common femoral and popliteal veins. There is minimal arterial flow identified within the bypass along its entire length, beginning at the proximal anastomosis/origin. No discreet fluid collections.    Right CFA peak systolic velocity: 160 cm/sec with biphasic waveforms.  Proximal right SFA peak systolic velocity: 50 cm/sec with monophasic waveforms.  Mid right SFA peak systolic velocity: 61 cm/sec with monophasic waveforms.  Distal right SFA peak systolic velocity: 23 cm/sec with monophasic waveforms.  Right popliteal artery peak systolic velocity: 71 cm/sec with monophasic, tardus parvus waveforms.  Right peroneal artery peak systolic velocity: 28 cm/s with monophasic tardus parvus waveforms.  Right anterior tibial artery peak systolic velocity: 29 cm/sec with monophasic tardus parvus waveforms.  Right posterior tibial artery peak systolic velocity: 33 cm/sec with monophasic tardus parvus waveforms.  Right dorsalis pedis peak systolic velocity: 80 cm/sec with a monophasic tardus parvus waveforms.              Assessment and Plan:     Problem List:    Active Diagnoses:    Diagnosis Date Noted POA    PRINCIPAL PROBLEM:  Atherosclerosis of artery of extremity with rest pain [I70.229] 03/02/2016 Yes    Peripheral artery disease [I73.9]  03/01/2016 Yes    Leg swelling [M79.89] 03/14/2016 Yes    Hypertension [I10] 03/01/2016 Yes    History of tobacco use [Z87.891] 11/04/2016 Not Applicable    Chronic kidney disease, stage III (moderate) [N18.3] 03/01/2016 Yes      Problems Resolved During this Admission:    Diagnosis Date Noted Date Resolved POA     Assessment and Plan:    1. Peripheral Artery Disease              2014: Began experience bilateral calf claudication.                1/2016: [facility]: Arterial Duplex: Right: SFA: Distal severe. RAVI 0.78. Left: SFA: Mid occlusion. RAVI 0.65.              2/1/2016: Began experience pain in right great toe.              12/1/2015: Chol 192. HDL 36. . .              3/2/2016: [facility] [place]: AF: Right: SFA: Lengthy occlusion. AT: Occluded. PT & PER: Patent. Left: LCI: 40%. LCF: 60%. SFA: Lengthy occlusion. AT: Occluded. PT & PER: Appear patent.              3/4/2016: [facility]: Right fem-pop bypass using reversed saphenous vein.              Tip of great toe recovered.              On atorvastatin 40 mg QD.              8/31/2016: Chol 144. HDL 37. LDL 85. .              Lipid panel favorable.              On aspirin 81 mg Q24.              4/28/2017: Recurrent pain in right foot.   5/4/2017: [facility] [place]: Angio: Right: SFA occluded. Fem-Pop graft occluded. PT patent.   Wants to try conservative management.              Dr. Aris Finney.     2. Leg Edema              Right leg after surgery that used vein.              Uses pressure stocking.     3. Hypertension              2012: Diagnosed.              On carvedilol 6.25 mg Q12, benazepril 20 mg Q24 and amlodipine 10 mg Q24.              Keeping log at home.              Appears well controlled.     4. History of Tobacco Use              1970: Began smoking.               3/1/2016: Quit smoking.              Stressed importance of not starting again.     5. Chronic Kidney Disease, Stage 3              12/10/2015: BUN/crea 32/1.63. CrCl 43.              Dr. Chong  Pao.     6. Primary Care              Dr. Conor Olivares.        VTE Risk Mitigation         Ordered     Medium Risk of VTE  Once      05/03/17 2002     Reason for No Pharmacological VTE Prophylaxis  Once      05/03/17 2002          Phoenix Ayers MD  Cardiology  [facility]

## 2017-05-05 NOTE — PROGRESS NOTES
Pt. Refused to wear telemetry monitor at the bedside. Neurovascular checks WDL. Purposeful hourly rounding completed. BL Pedal pulses auscultated with doppler.

## 2017-05-05 NOTE — PLAN OF CARE
11:33 AM   Refusing telemetry monitoring to be placed, monitor at bedside, will clarify with Armen.  Ambulating hallways free of CP/SOB, Urinating per urinal

## 2017-05-05 NOTE — PLAN OF CARE
"DC Planning:    Writer met with patient at bedside to discuss plan of care. Patient owns rolling walker, bedside commode, shower chair and cane due to previous surgery. Patient states he no longer uses DME. He reports having Home Health in the past but denies need for it now.     Patient has no needs, he did inquire about a Handicapped Parking sticker. Writer informed patient Handicap Stickers are typically addressed in PCP office. Patient states "well I see Dr. Ayers more than my PCP, can he do it?". Writer informed patient that CM will contact Dr. Ayers's office to inquire if the Handicap Sicker application can be filled out upon his follow up appointment. Per  at Dr. Ayers's office, patient can request form there.     No needs expressed, CM to remain supportive.     05/05/17 1216   Discharge Assessment   Assessment Type Discharge Planning Assessment   Confirmed/corrected address and phone number on facesheet? Yes   Assessment information obtained from? Patient   Prior to hospitilization cognitive status: Alert/Oriented   Prior to hospitalization functional status: Independent   Current cognitive status: Alert/Oriented   Current Functional Status: Independent   Arrived From admitted as an inpatient   Lives With friend(s)   Able to Return to Prior Arrangements yes   Is patient able to care for self after discharge? Yes   How many people do you have in your home that can help with your care after discharge? 1   Who are your caregiver(s) and their phone number(s)? Wesley Caal, michelle, [phone]   Patient currently being followed by outpatient case management? No   Patient currently receives home health services? No   Does the patient currently use HME? No  (does not use DME but owns: RW, shower chair, bed side commode, straight cane)   Patient currently receives private duty nursing? N/A   Patient currently receives any other outside agency services? No   Equipment Currently Used at " Home 3-in-1 commode;cane, straight;walker, rolling   Do you have any problems affording any of your prescribed medications? No   Is the patient taking medications as prescribed? yes   Do you have any financial concerns preventing you from receiving the healthcare you need? No   Does the patient have transportation to healthcare appointments? Yes   Transportation Available car;family or friend will provide   Discharge Plan A Home   Patient/Family In Agreement With Plan yes

## 2017-05-05 NOTE — DISCHARGE SUMMARY
[facility]  Cardiology  Discharge Summary      Patient Name: Brooks Canas  MRN: [id number]  Admission Date: 5/3/2017  Hospital Length of Stay: 2 days  Discharge Date and Time:  05/05/2017 3:40 PM  Attending Physician: Phoenix Ayers MD  Discharging Provider: Phoenix Ayers MD  Primary Care Physician: Conor Olivares MD    HPI:     Brooks Canas is a 67 y.o. male patient with hypertension who has been a smoker since 1970 but been unable to quit until 3/1/2016. He developed left calf claudication in 2014. In the beginning of 2/2016 he began experience pain in his right great toe that then slowly got more pronounced and later began aching at night. An Arterial Duplex study on 1/8/2016 suggested bilateral severe SFA disease. He was referred for urgent angiography that was performed on 3/2/2016 revealing bilateral SFA occlusions with two vessel runoff. On 3/4/2016 he underwent fem-pop bypass using a reverse saphenous vein. The right foot became warm and the rest pain resolved. The color of the right toe improved and healed. There has been no left calf claudication. On 4/28/2017 when visiting his sister in [place] he got sudden onset of pain in the right foot and he has since gotten pain in the right leg with minimal activities but no pain at rest. No exertional chest pain or exertional dyspnea. No palpitations or weak spell. Blood pressure been running fine. Feeling fair overall. Admitted to begin heparin iv and for AF angiography.     Hospital Course:      5/4/2017: [facility] [place]: Angio: Right: SFA occluded. Fem-Pop graft occluded. PT patent.     Denies rest pain. Been ambulating on floor without any clear pain. He was seen by Dr. Finney and redo fem-pop was considered but he wanted to try conservative management which appeared reasonable.      Procedure(s) (LRB):  AORTOGRAM WITH RUNOFF (Bilateral)     Indwelling Lines/Drains at time of discharge:  Lines/Drains/Airways     Drain                  Closed/Suction Drain 03/04/16 1501 Right Thigh Bulb 15 Fr. 427 days         Closed/Suction Drain 03/04/16 1528 Right Leg Bulb 15 Fr. 427 days              Assessment and Plan:    1. Peripheral Artery Disease              2014: Began experience bilateral calf claudication.                1/2016: [facility]: Arterial Duplex: Right: SFA: Distal severe. RAVI 0.78. Left: SFA: Mid occlusion. RAVI 0.65.              2/1/2016: Began experience pain in right great toe.              12/1/2015: Chol 192. HDL 36. . .              3/2/2016: [facility] [place]: AF: Right: SFA: Lengthy occlusion. AT: Occluded. PT & PER: Patent. Left: LCI: 40%. LCF: 60%. SFA: Lengthy occlusion. AT: Occluded. PT & PER: Appear patent.              3/4/2016: [facility]: Right fem-pop bypass using reversed saphenous vein.              Tip of great toe recovered.              On atorvastatin 40 mg QD.              8/31/2016: Chol 144. HDL 37. LDL 85. .              Lipid panel favorable.              On aspirin 81 mg Q24.              4/28/2017: Recurrent pain in right foot.              5/4/2017: [facility] [place]: Angio: Right: SFA occluded. Fem-Pop graft occluded. PT patent.              Wants to try conservative management.              Will add clopidogrel 75 mg Q24 until 8/2017.              Dr. Aris Finney.     2. Leg Edema              Right leg after surgery that used vein.              Uses pressure stocking.     3. Hypertension              2012: Diagnosed.              On carvedilol 6.25 mg Q12, benazepril 20 mg Q24 and amlodipine 10 mg Q24.              Keeping log at home.              Appears well controlled.     4. History of Tobacco Use              1970: Began smoking.               3/1/2016: Quit smoking.              Stressed importance of not starting again.     5. Chronic Kidney Disease, Stage 3              12/10/2015: BUN/crea 32/1.63. CrCl 43.              Dr. Castillo Cedeno.     6. Primary Care              Dr. Conor Olivares.      Consults:    Consults         Status Ordering Provider     Inpatient consult to Vascular Surgery  Once     Provider:  Aris Finney Jr., MD    Acknowledged PHOENIX FARMER          Significant Diagnostic Studies: Labs:   BMP:   Recent Labs  Lab 05/03/17 1939 05/04/17 0246 05/05/17  0410    99 72    139 140   K 5.1 4.2 4.8    111* 113*   CO2 24 21* 21*   BUN 26* 24* 25*   CREATININE 1.6* 1.4 1.5*   CALCIUM 9.6 8.3* 8.1*   , CMP   Recent Labs  Lab 05/03/17 1939 05/04/17  0246 05/05/17  0410    139 140   K 5.1 4.2 4.8    111* 113*   CO2 24 21* 21*    99 72   BUN 26* 24* 25*   CREATININE 1.6* 1.4 1.5*   CALCIUM 9.6 8.3* 8.1*   PROT 7.3  --   --    ALBUMIN 4.0  --   --    BILITOT 0.6  --   --    ALKPHOS 124  --   --    AST 19  --   --    ALT 20  --   --    ANIONGAP 9 7* 6*   ESTGFRAFRICA 50* 59* 54*   EGFRNONAA 43* 51* 47*    and CBC   Recent Labs  Lab 05/03/17 1939 05/04/17 0246 05/05/17  0002   WBC 6.49 5.31 5.08   HGB 13.4* 11.5* 11.3*   HCT 39.3* 33.5* 38.7*   * 123* 116*       Pending Diagnostic Studies:     None          Final Active Diagnoses:    Diagnosis Date Noted POA    PRINCIPAL PROBLEM:  Atherosclerosis of artery of extremity with rest pain [I70.229] 03/02/2016 Yes    Peripheral artery disease [I73.9] 03/01/2016 Yes    Leg swelling [M79.89] 03/14/2016 Yes    Hypertension [I10] 03/01/2016 Yes    History of tobacco use [Z87.891] 11/04/2016 Not Applicable    Chronic kidney disease, stage III (moderate) [N18.3] 03/01/2016 Yes      Problems Resolved During this Admission:    Diagnosis Date Noted Date Resolved POA       Discharged Condition: fair    Follow Up:  Follow-up Information     Follow up with Conor Olivares MD.    Specialty:  Internal Medicine    Why:  Keep follow up appointment as scheduled    Contact information:    [address] [facility] [id number] [phone]          Follow up with Phoenix Farmer MD. Schedule an appointment as soon as possible  for a visit in 3 weeks.    Specialty:  Cardiology    Contact information:    Josh HERNANDEZ  [facility] [id number] [phone]          Patient Instructions:     Diet general   Order Specific Question Answer Comments   Additional restrictions: Cardiac (Low Na/Chol)      Activity as tolerated       Medications:  Reconciled Home Medications:   Current Discharge Medication List      START taking these medications    Details   clopidogrel (PLAVIX) 75 mg tablet Take 1 tablet (75 mg total) by mouth once daily.  Qty: 90 tablet, Refills: 0         CONTINUE these medications which have CHANGED    Details   aspirin (ECOTRIN) 81 MG EC tablet Take 1 tablet (81 mg total) by mouth once daily.  Qty: 90 tablet, Refills: 3    Associated Diagnoses: Peripheral artery disease         CONTINUE these medications which have NOT CHANGED    Details   allopurinol (ZYLOPRIM) 100 MG tablet Take 100 mg by mouth once daily.  Refills: 3      amlodipine (NORVASC) 10 MG tablet Take 1 tablet (10 mg total) by mouth once daily.  Qty: 90 tablet, Refills: 3    Associated Diagnoses: Essential hypertension      atorvastatin (LIPITOR) 40 MG tablet TAKE 1 TABLET BY MOUTH ONCE DAILY  Qty: 30 tablet, Refills: 0    Associated Diagnoses: Peripheral artery disease      benazepril (LOTENSIN) 20 MG tablet Take 1 tablet (20 mg total) by mouth once daily.  Qty: 90 tablet, Refills: 3    Associated Diagnoses: Essential hypertension      carvedilol (COREG) 6.25 MG tablet Take 1 tablet (6.25 mg total) by mouth 2 (two) times daily.  Qty: 180 tablet, Refills: 3    Associated Diagnoses: Essential hypertension      tamsulosin (FLOMAX) 0.4 mg Cp24 Take 0.4 mg by mouth once daily.   Refills: 2      vitamin D 1000 units Tab Take 2,000 Units by mouth once daily.      VITAMIN D2 50,000 unit capsule Take 50,000 Units by mouth every 7 days.   Refills: 0             Time spent on the discharge of patient: 60 minutes    Phoenix Ayers MD  Cardiology  [facility]

## 2019-04-26 ENCOUNTER — HOSPITAL ENCOUNTER (INPATIENT)
Facility: OTHER | Age: 69
LOS: 11 days | Discharge: HOME HEALTH | DRG: 375 | End: 2019-05-07
Attending: EMERGENCY MEDICINE | Admitting: HOSPITALIST
Payer: MEDICARE

## 2019-04-26 DIAGNOSIS — D62 ACUTE BLOOD LOSS ANEMIA: ICD-10-CM

## 2019-04-26 DIAGNOSIS — I10 ESSENTIAL HYPERTENSION: ICD-10-CM

## 2019-04-26 DIAGNOSIS — N18.30 CHRONIC KIDNEY DISEASE, STAGE III (MODERATE): ICD-10-CM

## 2019-04-26 DIAGNOSIS — S36.408A: ICD-10-CM

## 2019-04-26 DIAGNOSIS — K22.89 ESOPHAGEAL MASS: ICD-10-CM

## 2019-04-26 DIAGNOSIS — K92.2 ACUTE UPPER GI BLEED: Primary | ICD-10-CM

## 2019-04-26 DIAGNOSIS — R53.1 WEAKNESS: ICD-10-CM

## 2019-04-26 LAB
ABO + RH BLD: NORMAL
ALBUMIN SERPL BCP-MCNC: 3 G/DL (ref 3.5–5.2)
ALP SERPL-CCNC: 82 U/L (ref 55–135)
ALT SERPL W/O P-5'-P-CCNC: 9 U/L (ref 10–44)
ANION GAP SERPL CALC-SCNC: 9 MMOL/L (ref 8–16)
APTT BLDCRRT: 25.7 SEC (ref 21–32)
AST SERPL-CCNC: 11 U/L (ref 10–40)
BASOPHILS # BLD AUTO: 0.04 K/UL (ref 0–0.2)
BASOPHILS NFR BLD: 0.3 % (ref 0–1.9)
BILIRUB SERPL-MCNC: 0.5 MG/DL (ref 0.1–1)
BLD GP AB SCN CELLS X3 SERPL QL: NORMAL
BUN SERPL-MCNC: 25 MG/DL (ref 8–23)
CALCIUM SERPL-MCNC: 8.8 MG/DL (ref 8.7–10.5)
CHLORIDE SERPL-SCNC: 109 MMOL/L (ref 95–110)
CO2 SERPL-SCNC: 22 MMOL/L (ref 23–29)
CREAT SERPL-MCNC: 1.5 MG/DL (ref 0.5–1.4)
DIFFERENTIAL METHOD: ABNORMAL
EOSINOPHIL # BLD AUTO: 0.1 K/UL (ref 0–0.5)
EOSINOPHIL NFR BLD: 0.9 % (ref 0–8)
ERYTHROCYTE [DISTWIDTH] IN BLOOD BY AUTOMATED COUNT: 15.2 % (ref 11.5–14.5)
EST. GFR  (AFRICAN AMERICAN): 54 ML/MIN/1.73 M^2
EST. GFR  (NON AFRICAN AMERICAN): 46 ML/MIN/1.73 M^2
GLUCOSE SERPL-MCNC: 164 MG/DL (ref 70–110)
HCT VFR BLD AUTO: 32.4 % (ref 40–54)
HGB BLD-MCNC: 10.3 G/DL (ref 14–18)
INR PPP: 1 (ref 0.8–1.2)
LIPASE SERPL-CCNC: 76 U/L (ref 4–60)
LYMPHOCYTES # BLD AUTO: 1.1 K/UL (ref 1–4.8)
LYMPHOCYTES NFR BLD: 9.1 % (ref 18–48)
MCH RBC QN AUTO: 27.2 PG (ref 27–31)
MCHC RBC AUTO-ENTMCNC: 31.8 G/DL (ref 32–36)
MCV RBC AUTO: 86 FL (ref 82–98)
MONOCYTES # BLD AUTO: 0.6 K/UL (ref 0.3–1)
MONOCYTES NFR BLD: 5.3 % (ref 4–15)
NEUTROPHILS # BLD AUTO: 10.2 K/UL (ref 1.8–7.7)
NEUTROPHILS NFR BLD: 84.2 % (ref 38–73)
PLATELET # BLD AUTO: 224 K/UL (ref 150–350)
PMV BLD AUTO: 10 FL (ref 9.2–12.9)
POTASSIUM SERPL-SCNC: 4.5 MMOL/L (ref 3.5–5.1)
PROT SERPL-MCNC: 5.7 G/DL (ref 6–8.4)
PROTHROMBIN TIME: 10.9 SEC (ref 9–12.5)
RBC # BLD AUTO: 3.78 M/UL (ref 4.6–6.2)
SODIUM SERPL-SCNC: 140 MMOL/L (ref 136–145)
WBC # BLD AUTO: 12.14 K/UL (ref 3.9–12.7)

## 2019-04-26 PROCEDURE — 93010 EKG 12-LEAD: ICD-10-PCS | Mod: ,,, | Performed by: INTERNAL MEDICINE

## 2019-04-26 PROCEDURE — 93010 ELECTROCARDIOGRAM REPORT: CPT | Mod: ,,, | Performed by: INTERNAL MEDICINE

## 2019-04-26 PROCEDURE — 25000003 PHARM REV CODE 250: Performed by: EMERGENCY MEDICINE

## 2019-04-26 PROCEDURE — G0378 HOSPITAL OBSERVATION PER HR: HCPCS

## 2019-04-26 PROCEDURE — 93005 ELECTROCARDIOGRAM TRACING: CPT

## 2019-04-26 PROCEDURE — 85730 THROMBOPLASTIN TIME PARTIAL: CPT

## 2019-04-26 PROCEDURE — 86850 RBC ANTIBODY SCREEN: CPT

## 2019-04-26 PROCEDURE — 80053 COMPREHEN METABOLIC PANEL: CPT

## 2019-04-26 PROCEDURE — 99285 EMERGENCY DEPT VISIT HI MDM: CPT | Mod: 25

## 2019-04-26 PROCEDURE — 85025 COMPLETE CBC W/AUTO DIFF WBC: CPT

## 2019-04-26 PROCEDURE — 96365 THER/PROPH/DIAG IV INF INIT: CPT

## 2019-04-26 PROCEDURE — 96376 TX/PRO/DX INJ SAME DRUG ADON: CPT

## 2019-04-26 PROCEDURE — 12000002 HC ACUTE/MED SURGE SEMI-PRIVATE ROOM

## 2019-04-26 PROCEDURE — C9113 INJ PANTOPRAZOLE SODIUM, VIA: HCPCS | Performed by: EMERGENCY MEDICINE

## 2019-04-26 PROCEDURE — 86920 COMPATIBILITY TEST SPIN: CPT

## 2019-04-26 PROCEDURE — 96375 TX/PRO/DX INJ NEW DRUG ADDON: CPT

## 2019-04-26 PROCEDURE — 85610 PROTHROMBIN TIME: CPT

## 2019-04-26 PROCEDURE — 83690 ASSAY OF LIPASE: CPT

## 2019-04-26 PROCEDURE — 99223 PR INITIAL HOSPITAL CARE,LEVL III: ICD-10-PCS | Mod: ,,, | Performed by: NURSE PRACTITIONER

## 2019-04-26 PROCEDURE — 63600175 PHARM REV CODE 636 W HCPCS: Performed by: EMERGENCY MEDICINE

## 2019-04-26 PROCEDURE — 96361 HYDRATE IV INFUSION ADD-ON: CPT

## 2019-04-26 PROCEDURE — 99223 1ST HOSP IP/OBS HIGH 75: CPT | Mod: ,,, | Performed by: NURSE PRACTITIONER

## 2019-04-26 RX ORDER — DIFLUPREDNATE OPHTHALMIC 0.5 MG/ML
1 EMULSION OPHTHALMIC 4 TIMES DAILY
Status: ON HOLD | COMMUNITY
Start: 2019-04-25 | End: 2019-05-07 | Stop reason: HOSPADM

## 2019-04-26 RX ORDER — ONDANSETRON 2 MG/ML
INJECTION INTRAMUSCULAR; INTRAVENOUS
Status: DISPENSED
Start: 2019-04-26 | End: 2019-04-27

## 2019-04-26 RX ORDER — ONDANSETRON 2 MG/ML
4 INJECTION INTRAMUSCULAR; INTRAVENOUS
Status: COMPLETED | OUTPATIENT
Start: 2019-04-26 | End: 2019-04-26

## 2019-04-26 RX ORDER — ONDANSETRON 2 MG/ML
4 INJECTION INTRAMUSCULAR; INTRAVENOUS EVERY 6 HOURS PRN
Status: DISCONTINUED | OUTPATIENT
Start: 2019-04-26 | End: 2019-05-07 | Stop reason: HOSPADM

## 2019-04-26 RX ORDER — PANTOPRAZOLE SODIUM 40 MG/10ML
80 INJECTION, POWDER, LYOPHILIZED, FOR SOLUTION INTRAVENOUS
Status: COMPLETED | OUTPATIENT
Start: 2019-04-26 | End: 2019-04-26

## 2019-04-26 RX ADMIN — SODIUM CHLORIDE 1000 ML: 0.9 INJECTION, SOLUTION INTRAVENOUS at 11:04

## 2019-04-26 RX ADMIN — ONDANSETRON 4 MG: 2 SOLUTION INTRAMUSCULAR; INTRAVENOUS at 09:04

## 2019-04-26 RX ADMIN — PANTOPRAZOLE SODIUM 80 MG: 40 INJECTION, POWDER, LYOPHILIZED, FOR SOLUTION INTRAVENOUS at 09:04

## 2019-04-26 RX ADMIN — ONDANSETRON 4 MG: 2 SOLUTION INTRAMUSCULAR; INTRAVENOUS at 11:04

## 2019-04-26 RX ADMIN — PANTOPRAZOLE SODIUM 8 MG/HR: 40 INJECTION, POWDER, FOR SOLUTION INTRAVENOUS at 09:04

## 2019-04-27 ENCOUNTER — ANESTHESIA EVENT (OUTPATIENT)
Dept: ENDOSCOPY | Facility: OTHER | Age: 69
DRG: 375 | End: 2019-04-27
Payer: MEDICARE

## 2019-04-27 ENCOUNTER — ANESTHESIA (OUTPATIENT)
Dept: ENDOSCOPY | Facility: OTHER | Age: 69
DRG: 375 | End: 2019-04-27
Payer: MEDICARE

## 2019-04-27 PROBLEM — D62 ACUTE BLOOD LOSS ANEMIA: Status: ACTIVE | Noted: 2019-04-27

## 2019-04-27 PROBLEM — E83.42 HYPOMAGNESEMIA: Status: ACTIVE | Noted: 2019-04-27

## 2019-04-27 LAB
ALBUMIN SERPL BCP-MCNC: 2.6 G/DL (ref 3.5–5.2)
ALP SERPL-CCNC: 62 U/L (ref 55–135)
ALT SERPL W/O P-5'-P-CCNC: 9 U/L (ref 10–44)
ANION GAP SERPL CALC-SCNC: 7 MMOL/L (ref 8–16)
AST SERPL-CCNC: 9 U/L (ref 10–40)
BASOPHILS # BLD AUTO: 0.01 K/UL (ref 0–0.2)
BASOPHILS # BLD AUTO: 0.01 K/UL (ref 0–0.2)
BASOPHILS NFR BLD: 0.1 % (ref 0–1.9)
BASOPHILS NFR BLD: 0.1 % (ref 0–1.9)
BILIRUB SERPL-MCNC: 0.9 MG/DL (ref 0.1–1)
BLD PROD TYP BPU: NORMAL
BLOOD UNIT EXPIRATION DATE: NORMAL
BLOOD UNIT TYPE CODE: 9500
BLOOD UNIT TYPE: NORMAL
BUN SERPL-MCNC: 47 MG/DL (ref 8–23)
CALCIUM SERPL-MCNC: 8.3 MG/DL (ref 8.7–10.5)
CHLORIDE SERPL-SCNC: 115 MMOL/L (ref 95–110)
CO2 SERPL-SCNC: 18 MMOL/L (ref 23–29)
CODING SYSTEM: NORMAL
CREAT SERPL-MCNC: 1.3 MG/DL (ref 0.5–1.4)
DIFFERENTIAL METHOD: ABNORMAL
DIFFERENTIAL METHOD: ABNORMAL
DISPENSE STATUS: NORMAL
EOSINOPHIL # BLD AUTO: 0 K/UL (ref 0–0.5)
EOSINOPHIL # BLD AUTO: 0 K/UL (ref 0–0.5)
EOSINOPHIL NFR BLD: 0 % (ref 0–8)
EOSINOPHIL NFR BLD: 0.1 % (ref 0–8)
ERYTHROCYTE [DISTWIDTH] IN BLOOD BY AUTOMATED COUNT: 14.4 % (ref 11.5–14.5)
ERYTHROCYTE [DISTWIDTH] IN BLOOD BY AUTOMATED COUNT: 14.9 % (ref 11.5–14.5)
ERYTHROCYTE [DISTWIDTH] IN BLOOD BY AUTOMATED COUNT: 15.3 % (ref 11.5–14.5)
EST. GFR  (AFRICAN AMERICAN): >60 ML/MIN/1.73 M^2
EST. GFR  (NON AFRICAN AMERICAN): 55 ML/MIN/1.73 M^2
FERRITIN SERPL-MCNC: 33 NG/ML (ref 20–300)
FOLATE SERPL-MCNC: 3.3 NG/ML (ref 4–24)
GLUCOSE SERPL-MCNC: 118 MG/DL (ref 70–110)
HCT VFR BLD AUTO: 24.3 % (ref 40–54)
HCT VFR BLD AUTO: 26.5 % (ref 40–54)
HCT VFR BLD AUTO: 26.7 % (ref 40–54)
HCT VFR BLD AUTO: 27.3 % (ref 40–54)
HGB BLD-MCNC: 7.7 G/DL (ref 14–18)
HGB BLD-MCNC: 8.8 G/DL (ref 14–18)
HGB BLD-MCNC: 8.8 G/DL (ref 14–18)
HGB BLD-MCNC: 8.9 G/DL (ref 14–18)
IRON SERPL-MCNC: 242 UG/DL (ref 45–160)
LYMPHOCYTES # BLD AUTO: 0.7 K/UL (ref 1–4.8)
LYMPHOCYTES # BLD AUTO: 1 K/UL (ref 1–4.8)
LYMPHOCYTES NFR BLD: 13.5 % (ref 18–48)
LYMPHOCYTES NFR BLD: 9.7 % (ref 18–48)
MAGNESIUM SERPL-MCNC: 1.5 MG/DL (ref 1.6–2.6)
MCH RBC QN AUTO: 27.3 PG (ref 27–31)
MCH RBC QN AUTO: 27.4 PG (ref 27–31)
MCH RBC QN AUTO: 28.1 PG (ref 27–31)
MCHC RBC AUTO-ENTMCNC: 31.7 G/DL (ref 32–36)
MCHC RBC AUTO-ENTMCNC: 32.2 G/DL (ref 32–36)
MCHC RBC AUTO-ENTMCNC: 33.6 G/DL (ref 32–36)
MCV RBC AUTO: 84 FL (ref 82–98)
MCV RBC AUTO: 85 FL (ref 82–98)
MCV RBC AUTO: 86 FL (ref 82–98)
MONOCYTES # BLD AUTO: 0.2 K/UL (ref 0.3–1)
MONOCYTES # BLD AUTO: 0.4 K/UL (ref 0.3–1)
MONOCYTES NFR BLD: 2.3 % (ref 4–15)
MONOCYTES NFR BLD: 5.4 % (ref 4–15)
NEUTROPHILS # BLD AUTO: 6.1 K/UL (ref 1.8–7.7)
NEUTROPHILS # BLD AUTO: 6.1 K/UL (ref 1.8–7.7)
NEUTROPHILS NFR BLD: 80.7 % (ref 38–73)
NEUTROPHILS NFR BLD: 87.7 % (ref 38–73)
NUM UNITS TRANS PACKED RBC: NORMAL
PHOSPHATE SERPL-MCNC: 1.8 MG/DL (ref 2.7–4.5)
PLATELET # BLD AUTO: 141 K/UL (ref 150–350)
PLATELET # BLD AUTO: 155 K/UL (ref 150–350)
PLATELET # BLD AUTO: 175 K/UL (ref 150–350)
PMV BLD AUTO: 10.1 FL (ref 9.2–12.9)
PMV BLD AUTO: 10.1 FL (ref 9.2–12.9)
PMV BLD AUTO: 10.2 FL (ref 9.2–12.9)
POTASSIUM SERPL-SCNC: 4.9 MMOL/L (ref 3.5–5.1)
PROT SERPL-MCNC: 4.8 G/DL (ref 6–8.4)
RBC # BLD AUTO: 2.82 M/UL (ref 4.6–6.2)
RBC # BLD AUTO: 3.17 M/UL (ref 4.6–6.2)
RBC # BLD AUTO: 3.21 M/UL (ref 4.6–6.2)
SATURATED IRON: 81 % (ref 20–50)
SODIUM SERPL-SCNC: 140 MMOL/L (ref 136–145)
TOTAL IRON BINDING CAPACITY: 300 UG/DL (ref 250–450)
TRANSFERRIN SERPL-MCNC: 203 MG/DL (ref 200–375)
VIT B12 SERPL-MCNC: 256 PG/ML (ref 210–950)
WBC # BLD AUTO: 6.91 K/UL (ref 3.9–12.7)
WBC # BLD AUTO: 7.54 K/UL (ref 3.9–12.7)
WBC # BLD AUTO: 9.17 K/UL (ref 3.9–12.7)

## 2019-04-27 PROCEDURE — 85025 COMPLETE CBC W/AUTO DIFF WBC: CPT | Mod: 91

## 2019-04-27 PROCEDURE — 63600175 PHARM REV CODE 636 W HCPCS: Performed by: EMERGENCY MEDICINE

## 2019-04-27 PROCEDURE — 37000008 HC ANESTHESIA 1ST 15 MINUTES: Performed by: INTERNAL MEDICINE

## 2019-04-27 PROCEDURE — 80053 COMPREHEN METABOLIC PANEL: CPT

## 2019-04-27 PROCEDURE — 43239 EGD BIOPSY SINGLE/MULTIPLE: CPT | Performed by: INTERNAL MEDICINE

## 2019-04-27 PROCEDURE — 82607 VITAMIN B-12: CPT

## 2019-04-27 PROCEDURE — 25000003 PHARM REV CODE 250: Performed by: NURSE PRACTITIONER

## 2019-04-27 PROCEDURE — 25000003 PHARM REV CODE 250: Performed by: INTERNAL MEDICINE

## 2019-04-27 PROCEDURE — 85018 HEMOGLOBIN: CPT

## 2019-04-27 PROCEDURE — 85027 COMPLETE CBC AUTOMATED: CPT

## 2019-04-27 PROCEDURE — 94761 N-INVAS EAR/PLS OXIMETRY MLT: CPT

## 2019-04-27 PROCEDURE — 36415 COLL VENOUS BLD VENIPUNCTURE: CPT

## 2019-04-27 PROCEDURE — 63600175 PHARM REV CODE 636 W HCPCS: Performed by: NURSE ANESTHETIST, CERTIFIED REGISTERED

## 2019-04-27 PROCEDURE — P9040 RBC LEUKOREDUCED IRRADIATED: HCPCS

## 2019-04-27 PROCEDURE — 82746 ASSAY OF FOLIC ACID SERUM: CPT

## 2019-04-27 PROCEDURE — C9113 INJ PANTOPRAZOLE SODIUM, VIA: HCPCS | Performed by: EMERGENCY MEDICINE

## 2019-04-27 PROCEDURE — 27201012 HC FORCEPS, HOT/COLD, DISP: Performed by: INTERNAL MEDICINE

## 2019-04-27 PROCEDURE — 99233 SBSQ HOSP IP/OBS HIGH 50: CPT | Mod: ,,, | Performed by: HOSPITALIST

## 2019-04-27 PROCEDURE — 36430 TRANSFUSION BLD/BLD COMPNT: CPT

## 2019-04-27 PROCEDURE — 25000003 PHARM REV CODE 250: Performed by: EMERGENCY MEDICINE

## 2019-04-27 PROCEDURE — 83540 ASSAY OF IRON: CPT

## 2019-04-27 PROCEDURE — 99233 PR SUBSEQUENT HOSPITAL CARE,LEVL III: ICD-10-PCS | Mod: ,,, | Performed by: HOSPITALIST

## 2019-04-27 PROCEDURE — 85014 HEMATOCRIT: CPT

## 2019-04-27 PROCEDURE — 37000009 HC ANESTHESIA EA ADD 15 MINS: Performed by: INTERNAL MEDICINE

## 2019-04-27 PROCEDURE — 84100 ASSAY OF PHOSPHORUS: CPT

## 2019-04-27 PROCEDURE — A4216 STERILE WATER/SALINE, 10 ML: HCPCS | Performed by: NURSE PRACTITIONER

## 2019-04-27 PROCEDURE — 25000003 PHARM REV CODE 250: Performed by: NURSE ANESTHETIST, CERTIFIED REGISTERED

## 2019-04-27 PROCEDURE — 82728 ASSAY OF FERRITIN: CPT

## 2019-04-27 PROCEDURE — 83735 ASSAY OF MAGNESIUM: CPT

## 2019-04-27 PROCEDURE — 20600001 HC STEP DOWN PRIVATE ROOM

## 2019-04-27 PROCEDURE — 63600175 PHARM REV CODE 636 W HCPCS: Performed by: HOSPITALIST

## 2019-04-27 RX ORDER — SODIUM CHLORIDE, SODIUM LACTATE, POTASSIUM CHLORIDE, CALCIUM CHLORIDE 600; 310; 30; 20 MG/100ML; MG/100ML; MG/100ML; MG/100ML
100 INJECTION, SOLUTION INTRAVENOUS CONTINUOUS
Status: DISCONTINUED | OUTPATIENT
Start: 2019-04-27 | End: 2019-05-07

## 2019-04-27 RX ORDER — ONDANSETRON 2 MG/ML
4 INJECTION INTRAMUSCULAR; INTRAVENOUS DAILY PRN
Status: DISCONTINUED | OUTPATIENT
Start: 2019-04-27 | End: 2019-04-27

## 2019-04-27 RX ORDER — MAGNESIUM SULFATE HEPTAHYDRATE 40 MG/ML
2 INJECTION, SOLUTION INTRAVENOUS ONCE
Status: COMPLETED | OUTPATIENT
Start: 2019-04-27 | End: 2019-04-27

## 2019-04-27 RX ORDER — HYDRALAZINE HYDROCHLORIDE 20 MG/ML
15 INJECTION INTRAMUSCULAR; INTRAVENOUS EVERY 4 HOURS PRN
Status: DISCONTINUED | OUTPATIENT
Start: 2019-04-27 | End: 2019-04-27

## 2019-04-27 RX ORDER — PROPOFOL 10 MG/ML
VIAL (ML) INTRAVENOUS
Status: DISCONTINUED | OUTPATIENT
Start: 2019-04-27 | End: 2019-04-27

## 2019-04-27 RX ORDER — HYDROCODONE BITARTRATE AND ACETAMINOPHEN 500; 5 MG/1; MG/1
TABLET ORAL
Status: DISCONTINUED | OUTPATIENT
Start: 2019-04-27 | End: 2019-05-07 | Stop reason: HOSPADM

## 2019-04-27 RX ORDER — SODIUM CHLORIDE 0.9 % (FLUSH) 0.9 %
10 SYRINGE (ML) INJECTION
Status: DISCONTINUED | OUTPATIENT
Start: 2019-04-27 | End: 2019-04-27

## 2019-04-27 RX ORDER — SODIUM CHLORIDE 9 MG/ML
INJECTION, SOLUTION INTRAVENOUS CONTINUOUS
Status: DISCONTINUED | OUTPATIENT
Start: 2019-04-27 | End: 2019-04-27

## 2019-04-27 RX ORDER — SODIUM CHLORIDE 9 MG/ML
INJECTION, SOLUTION INTRAVENOUS CONTINUOUS PRN
Status: DISCONTINUED | OUTPATIENT
Start: 2019-04-27 | End: 2019-04-27

## 2019-04-27 RX ORDER — LIDOCAINE HCL/PF 100 MG/5ML
SYRINGE (ML) INTRAVENOUS
Status: DISCONTINUED | OUTPATIENT
Start: 2019-04-27 | End: 2019-04-27

## 2019-04-27 RX ORDER — SODIUM CHLORIDE 0.9 % (FLUSH) 0.9 %
10 SYRINGE (ML) INJECTION
Status: DISCONTINUED | OUTPATIENT
Start: 2019-04-27 | End: 2019-05-07 | Stop reason: HOSPADM

## 2019-04-27 RX ORDER — ACETAMINOPHEN 325 MG/1
650 TABLET ORAL EVERY 4 HOURS PRN
Status: DISCONTINUED | OUTPATIENT
Start: 2019-04-27 | End: 2019-05-07 | Stop reason: HOSPADM

## 2019-04-27 RX ORDER — ONDANSETRON 8 MG/1
8 TABLET, ORALLY DISINTEGRATING ORAL EVERY 8 HOURS PRN
Status: DISCONTINUED | OUTPATIENT
Start: 2019-04-27 | End: 2019-05-07 | Stop reason: HOSPADM

## 2019-04-27 RX ORDER — DIPHENHYDRAMINE HYDROCHLORIDE 50 MG/ML
25 INJECTION INTRAMUSCULAR; INTRAVENOUS EVERY 6 HOURS PRN
Status: DISCONTINUED | OUTPATIENT
Start: 2019-04-27 | End: 2019-04-27

## 2019-04-27 RX ORDER — HYDROMORPHONE HYDROCHLORIDE 2 MG/ML
0.4 INJECTION, SOLUTION INTRAMUSCULAR; INTRAVENOUS; SUBCUTANEOUS EVERY 5 MIN PRN
Status: DISCONTINUED | OUTPATIENT
Start: 2019-04-27 | End: 2019-05-01 | Stop reason: HOSPADM

## 2019-04-27 RX ORDER — MEPERIDINE HYDROCHLORIDE 25 MG/ML
12.5 INJECTION INTRAMUSCULAR; INTRAVENOUS; SUBCUTANEOUS ONCE AS NEEDED
Status: DISCONTINUED | OUTPATIENT
Start: 2019-04-27 | End: 2019-04-27

## 2019-04-27 RX ORDER — SODIUM CHLORIDE 0.9 % (FLUSH) 0.9 %
3 SYRINGE (ML) INJECTION
Status: DISCONTINUED | OUTPATIENT
Start: 2019-04-27 | End: 2019-05-07 | Stop reason: HOSPADM

## 2019-04-27 RX ORDER — OXYCODONE HYDROCHLORIDE 5 MG/1
5 TABLET ORAL
Status: DISCONTINUED | OUTPATIENT
Start: 2019-04-27 | End: 2019-05-01

## 2019-04-27 RX ADMIN — PROPOFOL 30 MG: 10 INJECTION, EMULSION INTRAVENOUS at 01:04

## 2019-04-27 RX ADMIN — PANTOPRAZOLE SODIUM 8 MG/HR: 40 INJECTION, POWDER, FOR SOLUTION INTRAVENOUS at 06:04

## 2019-04-27 RX ADMIN — PANTOPRAZOLE SODIUM 8 MG/HR: 40 INJECTION, POWDER, FOR SOLUTION INTRAVENOUS at 07:04

## 2019-04-27 RX ADMIN — PANTOPRAZOLE SODIUM 8 MG/HR: 40 INJECTION, POWDER, FOR SOLUTION INTRAVENOUS at 11:04

## 2019-04-27 RX ADMIN — Medication 10 ML: at 07:04

## 2019-04-27 RX ADMIN — PROPOFOL 50 MG: 10 INJECTION, EMULSION INTRAVENOUS at 01:04

## 2019-04-27 RX ADMIN — SODIUM CHLORIDE: 9 INJECTION, SOLUTION INTRAVENOUS at 01:04

## 2019-04-27 RX ADMIN — SODIUM CHLORIDE, SODIUM LACTATE, POTASSIUM CHLORIDE, AND CALCIUM CHLORIDE 100 ML/HR: 600; 310; 30; 20 INJECTION, SOLUTION INTRAVENOUS at 07:04

## 2019-04-27 RX ADMIN — LIDOCAINE HYDROCHLORIDE 50 MG: 20 INJECTION, SOLUTION INTRAVENOUS at 01:04

## 2019-04-27 RX ADMIN — MAGNESIUM SULFATE IN WATER 2 G: 40 INJECTION, SOLUTION INTRAVENOUS at 08:04

## 2019-04-27 RX ADMIN — SODIUM CHLORIDE: 0.9 INJECTION, SOLUTION INTRAVENOUS at 01:04

## 2019-04-27 RX ADMIN — PANTOPRAZOLE SODIUM 8 MG/HR: 40 INJECTION, POWDER, FOR SOLUTION INTRAVENOUS at 02:04

## 2019-04-27 NOTE — ASSESSMENT & PLAN NOTE
-Due to GI bleeding  -Received 1 unit PRBC this morning  -Check iron, ferritin, b12 and folate  -Repeat h/h q6h.

## 2019-04-27 NOTE — TRANSFER OF CARE
"Anesthesia Transfer of Care Note    Patient: Brooks Canas    Procedure(s) Performed: Procedure(s) (LRB):  EGD (ESOPHAGOGASTRODUODENOSCOPY) (Left)    Patient location: ICU    Anesthesia Type: general    Transport from OR: Transported from OR on 2-3 L/min O2 by NC with adequate spontaneous ventilation    Post pain: adequate analgesia    Post assessment: no apparent anesthetic complications    Post vital signs: stable    Level of consciousness: awake and alert    Nausea/Vomiting: no nausea/vomiting    Complications: none    Transfer of care protocol was followedComments: Patient transported to ICU; monitors applied; VSS; patient awake and alert; all questions answered      Last vitals:   Visit Vitals  /65   Pulse 76   Temp 36.8 °C (98.3 °F) (Oral)   Resp 18   Ht 5' 10" (1.778 m)   Wt 81 kg (178 lb 9.2 oz)   SpO2 98%   BMI 25.62 kg/m²     "

## 2019-04-27 NOTE — SUBJECTIVE & OBJECTIVE
Interval History: No further hematemesis.  Denies cp and sob.  Notes abdominal distention but no pain.  Notes recent feeling of food getting stuck in throat with odynophagia    Review of Systems   Constitutional: Negative for activity change and appetite change.   HENT: Negative for congestion and dental problem.    Eyes: Negative for discharge and itching.   Respiratory: Negative for apnea, chest tightness and shortness of breath.    Cardiovascular: Negative for chest pain and leg swelling.   Gastrointestinal: Positive for abdominal distention and vomiting. Negative for abdominal pain, anal bleeding, blood in stool, constipation, diarrhea, nausea and rectal pain.   Endocrine: Negative for cold intolerance.   Genitourinary: Negative for difficulty urinating and dysuria.   Musculoskeletal: Negative for arthralgias and back pain.   Allergic/Immunologic: Negative for environmental allergies.   Neurological: Negative for dizziness and facial asymmetry.   Hematological: Negative for adenopathy. Does not bruise/bleed easily.   Psychiatric/Behavioral: Negative for agitation and behavioral problems.     Objective:     Vital Signs (Most Recent):  Temp: 98.4 °F (36.9 °C) (04/27/19 0700)  Pulse: 78 (04/27/19 0830)  Resp: (!) 21 (04/27/19 0830)  BP: (!) 161/74 (04/27/19 0830)  SpO2: 98 % (04/27/19 0830) Vital Signs (24h Range):  Temp:  [98.4 °F (36.9 °C)-98.7 °F (37.1 °C)] 98.4 °F (36.9 °C)  Pulse:  [70-88] 78  Resp:  [12-26] 21  SpO2:  [95 %-100 %] 98 %  BP: ()/(46-80) 161/74     Weight: 81 kg (178 lb 9.2 oz)  Body mass index is 25.62 kg/m².    Intake/Output Summary (Last 24 hours) at 4/27/2019 1058  Last data filed at 4/27/2019 0642  Gross per 24 hour   Intake 871.33 ml   Output 450 ml   Net 421.33 ml      Physical Exam   Constitutional: He is oriented to person, place, and time. He appears well-developed and well-nourished.   HENT:   Head: Normocephalic and atraumatic.   Eyes: Pupils are equal, round, and reactive to  light. EOM are normal.   Neck: Normal range of motion. Neck supple.   Cardiovascular: Normal rate, regular rhythm and normal heart sounds.   Pulmonary/Chest: Effort normal and breath sounds normal. No respiratory distress.   Abdominal: Soft. Bowel sounds are normal. He exhibits no distension. There is no tenderness.   S/NT/mild distention, bowel sounds present but diminished   Musculoskeletal: Normal range of motion. He exhibits no edema.   Neurological: He is oriented to person, place, and time. No cranial nerve deficit. Coordination normal.   Skin: Skin is warm and dry.   Psychiatric: He has a normal mood and affect. His behavior is normal.   Vitals reviewed.      Significant Labs: All pertinent labs within the past 24 hours have been reviewed.    Significant Imaging: I have reviewed and interpreted all pertinent imaging results/findings within the past 24 hours.

## 2019-04-27 NOTE — ASSESSMENT & PLAN NOTE
-Mr. Canas is admitted to inpatient status in our ICU.  -Recently noted to have odynophagia with feeling of food stuck in his chest.  -He had multiple episodes of bright red blood emesis after which he was hypotensive.  -Denies NSAID use.  Denies recent abdominal pain.  No known evidence of portal hypertension so varices would seem less likely.  -Most likely this is due to PUD or gastritis.  -He has been started on protonix and sandostatin drips.  No evidence of portal hypertension so would stop sandostatin if ok with GI  -Continue H/H q6h.  Transfuse for Hb < 7.0  -Hold aspirin and plavix.  -Continue NPO  -Await GI consult.

## 2019-04-27 NOTE — CONSULTS
Reason for Consult:  GI bleed    HPI:  Pt is a 68 y.o. male who developed hematemesis acutely before presenting to the ED. He was relatively stable in the ED except for some orthostasis. Prior to transport to [facility] he had another episode of hematemesis and became hypotensive. He was admitted to ICU where he received blood and was continued on PPI infusion. Sandostatin was added as well. He c/o intermittent dysphagia. No food impactions. Otherwise no GI complaints. He has had no melena.    He has a h/o PAD and is on Plavix chronically.     Past Medical History:   Diagnosis Date    BPH (benign prostatic hyperplasia)     Chronic kidney disease, stage III (moderate) 3/1/2016    12/10/2015: BUN/crea: 32/1.63. CrCl 43.    Claudication in peripheral vascular disease     History of tobacco use 11/4/2016    1970: Began smoking.   3/1/2016: Quit smoking.    Hypertension, benign 3/1/2016    2012: Diagnosed.    Hyperuricemia     Leg swelling 3/14/2016    3/4/2016: Began having right leg edema.    PAD (peripheral artery disease)     Peripheral artery disease 3/1/2016    2014: Began experience bilateral calf claudication.  1/2016: [facility]: Arterial Duplex: Right: SFA: distal severe. RAVI 0.78. Left: SFA: mid occlusion. RAVI 0.65. 2/1/2016: Began experience pain in right great toe.    Tobacco use 3/1/2016    1970: Began smoking. Unable to quit.    Vitamin D deficiency disease        Past Surgical History:   Procedure Laterality Date    ANGIOGRAM-EXTREMITY-LOWER Bilateral 3/2/2016    Performed by Phoenix Ayers MD at [facility] CATH LAB    AORTOGRAM WITH RUNOFF Bilateral 5/4/2017    Performed by Phoenix Ayers MD at [facility] CATH LAB    [id number] Right 3/4/2016    Performed by Aris Finney Jr., MD at [facility] OR       History reviewed. No pertinent family history.    Social History     Socioeconomic History    Marital status: Single     Spouse name: Not on file    Number of children: Not on file    Years of  education: Not on file    Highest education level: Not on file   Occupational History    Not on file   Social Needs    Financial resource strain: Not on file    Food insecurity:     Worry: Not on file     Inability: Not on file    Transportation needs:     Medical: Not on file     Non-medical: Not on file   Tobacco Use    Smoking status: Former Smoker     Packs/day: 0.75     Years: 46.00     Pack years: 34.50     Start date: 1/1/1970     Last attempt to quit: 3/1/2016     Years since quitting: 3.1    Smokeless tobacco: Never Used   Substance and Sexual Activity    Alcohol use: No     Alcohol/week: 0.0 oz    Drug use: Not on file    Sexual activity: Not on file   Lifestyle    Physical activity:     Days per week: Not on file     Minutes per session: Not on file    Stress: Not on file   Relationships    Social connections:     Talks on phone: Not on file     Gets together: Not on file     Attends L[employer]-day service: Not on file     Active member of club or organization: Not on file     Attends meetings of clubs or organizations: Not on file     Relationship status: Not on file   Other Topics Concern    Not on file   Social History Narrative    Not on file       Review of patient's allergies indicates:  No Known Allergies    No current facility-administered medications on file prior to encounter.      Current Outpatient Medications on File Prior to Encounter   Medication Sig Dispense Refill    besifloxacin (BESIVANCE) 0.6 % DrpS 1 drop 3 (three) times daily.      difluprednate (DUREZOL) 0.05 % Drop ophthalmic solution 1 drop 4 (four) times daily.      nepafenac (ILEVRO) 0.3 % DrpS Apply 1 drop to eye once daily.      allopurinol (ZYLOPRIM) 100 MG tablet Take 100 mg by mouth once daily.  3    amLODIPine (NORVASC) 10 MG tablet TAKE 1 TABLET(10 MG) BY MOUTH EVERY DAY 90 tablet 0    atorvastatin (LIPITOR) 40 MG tablet TAKE 1 TABLET BY MOUTH ONCE DAILY 30 tablet 0    benazepril (LOTENSIN) 20 MG tablet  TAKE 1 TABLET(20 MG) BY MOUTH EVERY DAY 90 tablet 0    carvedilol (COREG) 6.25 MG tablet TAKE 1 TABLET(6.25 MG) BY MOUTH TWICE DAILY 180 tablet 0    clopidogrel (PLAVIX) 75 mg tablet TAKE 1 TABLET BY MOUTH ONCE DAILY. 90 tablet 0    clopidogrel (PLAVIX) 75 mg tablet TAKE 1 TABLET BY MOUTH ONCE DAILY. 90 tablet 0    tamsulosin (FLOMAX) 0.4 mg Cp24 Take 0.4 mg by mouth once daily.   2     Scheduled Meds:   BESIFLOXACIN HCL 0.6% EYE DROPS (patient's own med)  1 drop Right Eye TID    DUREZOL (DIFLUPREDNATE 0.05%) EYE DROPS (patient's own med)  1 drop Right Eye BID    ILEVO (NEPAFENAC 0.3%) EYE DROPS (patient's own med)  1 drop Right Eye Daily     Continuous Infusions:   sodium chloride 0.9% 100 mL/hr at 04/27/19 0124    pantoprazole 40 mg in dextrose 5 % 100 mL infusion (ready to mix system) 8 mg/hr (04/27/19 1123)     PRN Meds:.sodium chloride, acetaminophen, hydrALAZINE, ondansetron, ondansetron, sodium chloride 0.9%, sodium chloride 0.9%    Review of Systems:  CONSTITUTIONAL: Negative for weakness, fever, weight loss, weight gain.  HEENT: Eyes: Negative for double/blurred vision. Ears: Negative pain or loss of hearing. Nose:Negative for nasal congestion. Negative for rhinorrhea Mouth: Negative for dry mouth/pain Throat: Negative for masses or hoarseness.  CARDIOVASCULAR: Negative for chest pain or palpitations.  RESPIRATORY: Negative for SOB, wheezes  GASTROINTESTINAL: See HPI  GENITOURINARY: Negative for dysuria/hematuria.  MUSCULOSKELETAL: Negative for osteoarthritis, muscle pain.  SKIN: Negative for rashes/lesions.  NEUROLOGIC: Negative for headaches, numbness/tingling.  ENDOCRINE: Negative for diabetes or thyroid abnormalities.  HEMATOLOGIC: Negative for anemia or blood dyscrasias.    Patient Vitals for the past 24 hrs:   BP Temp Temp src Pulse Resp SpO2 Height Weight   04/27/19 1100 138/65 98.3 °F (36.8 °C) Oral 76 18 98 % -- --   04/27/19 1030 (!) 163/77 -- -- 84 (!) 25 98 % -- --   04/27/19 1000 (!)  "142/74 -- -- 82 (!) 22 98 % -- --   04/27/19 0930 (!) 152/67 -- -- 70 14 96 % -- --   04/27/19 0900 102/65 -- -- 80 18 98 % -- --   04/27/19 0830 (!) 161/74 -- -- 78 (!) 21 98 % -- --   04/27/19 0800 133/63 -- -- 74 14 96 % -- --   04/27/19 0730 124/60 -- -- 82 14 95 % -- --   04/27/19 0700 (!) 156/67 98.4 °F (36.9 °C) Oral 84 17 98 % -- --   04/27/19 0644 -- -- -- 82 15 98 % -- --   04/27/19 0600 (!) 152/67 -- -- 80 15 97 % -- --   04/27/19 0530 136/63 -- -- 76 14 97 % -- --   04/27/19 0500 129/61 -- -- 80 14 97 % -- --   04/27/19 0445 137/66 -- -- 78 15 98 % -- --   04/27/19 0430 (!) 152/80 -- -- 82 (!) 21 98 % -- --   04/27/19 0415 135/63 -- -- 78 (!) 26 98 % -- --   04/27/19 0400 (!) 150/65 98.6 °F (37 °C) Oral 76 16 98 % -- --   04/27/19 0345 (!) 129/59 -- -- 74 14 96 % -- --   04/27/19 0330 (!) 119/55 -- -- 76 15 96 % -- --   04/27/19 0320 -- 98.7 °F (37.1 °C) Oral 76 14 96 % -- --   04/27/19 0315 134/60 -- -- 78 15 96 % -- --   04/27/19 0300 (!) 118/55 -- -- 78 15 97 % -- --   04/27/19 0245 138/69 -- -- 80 15 98 % -- --   04/27/19 0230 132/73 -- -- 86 15 100 % -- --   04/27/19 0215 133/67 -- -- 80 19 100 % -- --   04/27/19 0200 (!) 142/78 -- -- 80 18 100 % -- --   04/27/19 0150 (!) 149/68 -- -- 86 20 100 % -- --   04/27/19 0135 (!) 147/70 98.5 °F (36.9 °C) Oral 86 18 99 % -- --   04/27/19 0115 (!) 147/70 -- -- 86 15 100 % -- --   04/27/19 0110 -- -- -- 88 -- 100 % 5' 10" (1.778 m) 81 kg (178 lb 9.2 oz)   04/27/19 0105 -- 98.5 °F (36.9 °C) Oral -- -- -- -- --   04/27/19 0034 (!) 118/58 98.4 °F (36.9 °C) Oral -- -- -- -- --   04/27/19 0023 (!) 115/59 -- -- -- -- -- -- --   04/27/19 0013 (!) 120/59 -- -- -- -- -- -- --   04/27/19 0003 118/61 -- -- -- -- -- -- --   04/26/19 2353 (!) 109/57 -- -- -- -- -- -- --   04/26/19 2346 (!) 71/46 -- -- -- -- -- -- --   04/26/19 2303 (!) 103/56 -- -- 78 12 100 % -- --   04/26/19 2258 -- -- -- 80 18 100 % -- --   04/26/19 2228 -- -- -- 76 19 100 % -- --   04/26/19 2133 -- -- " -- 70 13 100 % -- --   04/26/19 2124 135/60 -- -- 74 -- 100 % -- --       Gen: Well developed, well nourished, no apparent distress, cooperative  HEENT: Anicteric, PERRLA, normocephalic, neck symmetrical  CV: S1, S2, no murmers/rubs, non-displaced PMI  Lungs: CTA-B, normal excursion  Abd: Soft, NT, ND, normal BS's, no HSM  Ext: No c/c/e, 1+ DP pulses to BLE's  Neuro: CN II-XII grossly intact, no asterixis.  Skin: No rashes/lesions, normal texture  Psych: AA&O x 4, normal affect    Labs:  Recent Labs   Lab 04/27/19  0621   CALCIUM 8.3*   PROT 4.8*      K 4.9   CO2 18*   *   BUN 47*   CREATININE 1.3   ALKPHOS 62   ALT 9*   AST 9*   BILITOT 0.9     Recent Results (from the past 336 hour(s))   CBC auto differential    Collection Time: 04/27/19  6:21 AM   Result Value Ref Range    WBC 7.54 3.90 - 12.70 K/uL    Hemoglobin 8.8 (L) 14.0 - 18.0 g/dL    Hematocrit 27.3 (L) 40.0 - 54.0 %    Platelets 141 (L) 150 - 350 K/uL   CBC auto differential    Collection Time: 04/27/19 12:43 AM   Result Value Ref Range    WBC 6.91 3.90 - 12.70 K/uL    Hemoglobin 7.7 (L) 14.0 - 18.0 g/dL    Hematocrit 24.3 (L) 40.0 - 54.0 %    Platelets 175 150 - 350 K/uL   CBC auto differential    Collection Time: 04/26/19  9:22 PM   Result Value Ref Range    WBC 12.14 3.90 - 12.70 K/uL    Hemoglobin 10.3 (L) 14.0 - 18.0 g/dL    Hematocrit 32.4 (L) 40.0 - 54.0 %    Platelets 224 150 - 350 K/uL     Recent Labs   Lab 04/26/19 2122   INR 1.0   APTT 25.7   Results for FLORENCIO SARGENT (MRN [id number]) as of 4/27/2019 12:47   Ref. Range 4/26/2019 21:22 4/26/2019 21:28 4/26/2019 21:52 4/27/2019 00:43 4/27/2019 06:21   WBC Latest Ref Range: 3.90 - 12.70 K/uL 12.14   6.91 7.54   RBC Latest Ref Range: 4.60 - 6.20 M/uL 3.78 (L)   2.82 (L) 3.21 (L)   Hemoglobin Latest Ref Range: 14.0 - 18.0 g/dL 10.3 (L)   7.7 (L) 8.8 (L)   Hematocrit Latest Ref Range: 40.0 - 54.0 % 32.4 (L)   24.3 (L) 27.3 (L)   MCV Latest Ref Range: 82 - 98 fL 86   86 85   MCH  Latest Ref Range: 27.0 - 31.0 pg 27.2   27.3 27.4   MCHC Latest Ref Range: 32.0 - 36.0 g/dL 31.8 (L)   31.7 (L) 32.2   RDW Latest Ref Range: 11.5 - 14.5 % 15.2 (H)   15.3 (H) 14.4   Platelets Latest Ref Range: 150 - 350 K/uL 224   175 141 (L)   MPV Latest Ref Range: 9.2 - 12.9 fL 10.0   10.2 10.1   Gran% Latest Ref Range: 38.0 - 73.0 % 84.2 (H)   87.7 (H) 80.7 (H)   Gran # (ANC) Latest Ref Range: 1.8 - 7.7 K/uL 10.2 (H)   6.1 6.1   Lymph% Latest Ref Range: 18.0 - 48.0 % 9.1 (L)   9.7 (L) 13.5 (L)   Lymph # Latest Ref Range: 1.0 - 4.8 K/uL 1.1   0.7 (L) 1.0   Mono% Latest Ref Range: 4.0 - 15.0 % 5.3   2.3 (L) 5.4   Mono # Latest Ref Range: 0.3 - 1.0 K/uL 0.6   0.2 (L) 0.4   Eosinophil% Latest Ref Range: 0.0 - 8.0 % 0.9   0.1 0.0   Eos # Latest Ref Range: 0.0 - 0.5 K/uL 0.1   0.0 0.0   Basophil% Latest Ref Range: 0.0 - 1.9 % 0.3   0.1 0.1   Baso # Latest Ref Range: 0.00 - 0.20 K/uL 0.04   0.01 0.01       Imaging:  FINDINGS:  Monitoring leads overlie the abdomen.  Nonobstructive bowel gas pattern.  No organomegaly or significant mass effect.  No large amount of free air or abnormal calcification projected over the collecting systems.  Scattered atherosclerotic vascular calcifications noted.  Included lung bases are clear.  Surgical clips project over the right inguinal region.  Osseous structures show age-related mild degenerative change without acute or destructive process seen.      Impression       Nonobstructive bowel gas pattern.       Assessment:  Pt. Is a 68 y.o. male with h/o PAD who presents with anemia and acute blood loss anemia    Recommendations:  EGD today  Continue PPI  May d/c sandostatin as clinically no suspicion of portal HTN      I would like to take this opportunity to thank you for this consult.  If you have any questions or concerns, please do not hesitate to contact me.

## 2019-04-27 NOTE — ASSESSMENT & PLAN NOTE
Patient having multiple bright red blood emesis episodes. Hypotensive after.    Protonix/Sandostatin IV  NPO  Consult GI  Hold Plavix

## 2019-04-27 NOTE — ED TRIAGE NOTES
"Pt to ED via EMS with report of "difficulty swallowing" Xs 2 weks, with 1 episode of dizziness followed by 1 episode of hematemesis aprox 20 minutes PTA. Pt appears pale, and CO continued nausea, and denies weakness and dizziness. Pt is calm and cooperative, respirations even and unlabored, NAD noted.   "

## 2019-04-27 NOTE — [employer] PATIENT INSTRUCTIONS
Discharge Summary/Instructions after an Endoscopic Procedure  Patient Name: Brooks Canas  Patient MRN: [id number]  Patient YOB: 1950 Saturday, April 27, 2019  Carine Le MD  RESTRICTIONS:  During your procedure today, you received medications for sedation.  These   medications may affect your judgment, balance and coordination.  Therefore,   for 24 hours, you have the following restrictions:   - DO NOT drive a car, operate machinery, make legal/financial decisions,   sign important papers or drink alcohol.    ACTIVITY:  Today: no heavy lifting, straining or running due to procedural   sedation/anesthesia.  The following day: return to full activity including work.  DIET:  Eat and drink normally unless instructed otherwise.     TREATMENT FOR COMMON SIDE EFFECTS:  - Mild abdominal pain, nausea, belching, bloating or excessive gas:  rest,   eat lightly and use a heating pad.  - Sore Throat: treat with throat lozenges and/or gargle with warm salt   water.  - Because air was used during the procedure, expelling large amounts of air   from your rectum or belching is normal.  - If a bowel prep was taken, you may not have a bowel movement for 1-3 days.    This is normal.  SYMPTOMS TO WATCH FOR AND REPORT TO YOUR PHYSICIAN:  1. Abdominal pain or bloating, other than gas cramps.  2. Chest pain.  3. Back pain.  4. Signs of infection such as: chills or fever occurring within 24 hours   after the procedure.  5. Rectal bleeding, which would show as bright red, maroon, or black stools.   (A tablespoon of blood from the rectum is not serious, especially if   hemorrhoids are present.)  6. Vomiting.  7. Weakness or dizziness.  GO DIRECTLY TO THE NEAREST EMERGENCY ROOM IF YOU HAVE ANY OF THE FOLLOWING:      Difficulty breathing              Chills and/or fever over 101 F   Persistent vomiting and/or vomiting blood   Severe abdominal pain   Severe chest pain   Black, tarry stools   Bleeding- more than one  tablespoon   Any other symptom or condition that you feel may need urgent attention  Your doctor recommends these additional instructions:  If any biopsies were taken, your doctors clinic will contact you in 1 to 2   weeks with any results.  - Return patient to ICU for ongoing care.   - Refer to advanced endoscopy/ thoracic or GI surgeon today.   - Await pathology results.   - Give Protonix (pantoprazole): 8 mg/hr IV by continuous infusion.  For questions, problems or results please call your physician - Carine Le MD at Work:  [phone].  [facility], EMERGENCY ROOM PHONE NUMBER: [phone], [place]   [phone].  IF A COMPLICATION OR EMERGENCY SITUATION ARISES AND YOU ARE UNABLE TO REACH   YOUR PHYSICIAN - GO DIRECTLY TO THE EMERGENCY ROOM.  MD Carine Mancilla MD  4/27/2019 2:21:08 PM  This report has been verified and signed electronically.  [employer]

## 2019-04-27 NOTE — PLAN OF CARE
Initial Discharge Planning Assesment:  Patient admitted on 4/26/2019    Chart reviewed, Care plan discussed. Discussed care plan with treatment team,  attending Dr Pablo    PCP updated in Epic: Dr. Sahu  Pharmacy, updated in Epic: [facility] [address]    DME at home: None    Current dispo Home with S/o  Transportation: S/o to drive home.    Case management  to follow.  No anticipated DC needs from CM perspective.       04/27/19 4278   Discharge Assessment   Assessment Type Discharge Planning Assessment   Confirmed/corrected address and phone number on facesheet? Yes   Assessment information obtained from? Patient;Caregiver   Communicated expected length of stay with patient/caregiver yes   Prior to hospitilization cognitive status: Alert/Oriented   Prior to hospitalization functional status: Independent   Current cognitive status: Alert/Oriented   Current Functional Status: Independent   Lives With significant other   Able to Return to Prior Arrangements yes   Is patient able to care for self after discharge? Yes   Who are your caregiver(s) and their phone number(s)? S/o Wesley Jolley [phone]   Patient's perception of discharge disposition home or selfcare   Readmission Within the Last 30 Days no previous admission in last 30 days   Patient currently being followed by outpatient case management? No   Patient currently receives any other outside agency services? No   Equipment Currently Used at Home none   Do you have any problems affording any of your prescribed medications? No   Is the patient taking medications as prescribed? yes   Does the patient have transportation home? Yes   Transportation Anticipated family or friend will provide   Does the patient receive services at the Coumadin Clinic? No   Discharge Plan A Home with family   DME Needed Upon Discharge  none   Patient/Family in Agreement with Plan yes

## 2019-04-27 NOTE — PROGRESS NOTES
Please call extension [phone] (if nobody answers, this will flip to a beeper, so put in your call back number) upon patient arrival to floor for Hospital Medicine admit team assignment and for additional admit orders for the patient.  Do not page the attending, staff physician associate with the patient on arrival (may not be in-house at the time of arrival).  Rather, always call [phone] to reach the triage physician for orders and team assignment.        Outside Transfer Acceptance Note     Patients name: Brooks Canas     Transferring Physician or Mid-Level provider/Clinic giving report: Aris Pablo MD ([facility]     Accepting Physician for admission to hospital: Omaira Meneses MD      Date of acceptance:  04/27/2019    Allergies: Unknown     Reason for transfer: Needs GI consultation and possible cardiothoracic support     HPI:  This is a 58 year old male with tobacco abuse, quit in 2016, PVD currently on Plavix who presented on 4/26 with multiple episodes of hematemesis.  Initial H/H was 10.3/32.4.  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  The mass was partially obstructing and partially circumferential.  Biopsies were taken with an estimated blood loss of 10 cc.  GI notes that part of the tumor did break off, and there was significant oozing during the course of the procedure, and she feels more support is needed if the friable mass starts to bleed more profusely. Case was discussed with Dr. Ferreira, who feels advanced endoscopy is not needed at this time, but will transfer for more GI and cardiothoracic support.      VS:  /65, pulse 78, respirations 22, temperature 98.5F, O2 Sats 98% on RA    Labs: H/H 8.9/26.5 (post-procedure), BUN/Cr 47/1.3, Albumin 2.6     Radiographs:  Two-view abdominal plain film that is unremarkable    To Do  List upon arrival:  1.  Consult GI                                             2.  Serial H/H

## 2019-04-27 NOTE — ED NOTES
While attempting to transfer pt to wheelchair for transport  Pt had a near syncopal episode, and vomited aprox 150 CC dark red blood. Pt assisted back to bed, laid supine, 1000 cc bolus NS started, and given 4 mg Zofran. Dario Esparza notified of Pt condition and will be upgraded to ICU. Pt updated on POC.

## 2019-04-27 NOTE — SUBJECTIVE & OBJECTIVE
Past Medical History:   Diagnosis Date    BPH (benign prostatic hyperplasia)     Chronic kidney disease, stage III (moderate) 3/1/2016    12/10/2015: BUN/crea: 32/1.63. CrCl 43.    Claudication in peripheral vascular disease     History of tobacco use 11/4/2016    1970: Began smoking.   3/1/2016: Quit smoking.    Hypertension, benign 3/1/2016    2012: Diagnosed.    Hyperuricemia     Leg swelling 3/14/2016    3/4/2016: Began having right leg edema.    PAD (peripheral artery disease)     Peripheral artery disease 3/1/2016    2014: Began experience bilateral calf claudication.  1/2016: [facility]: Arterial Duplex: Right: SFA: distal severe. RAVI 0.78. Left: SFA: mid occlusion. RAVI 0.65. 2/1/2016: Began experience pain in right great toe.    Tobacco use 3/1/2016    1970: Began smoking. Unable to quit.    Vitamin D deficiency disease        Past Surgical History:   Procedure Laterality Date    ANGIOGRAM-EXTREMITY-LOWER Bilateral 3/2/2016    Performed by Phoenix Ayers MD at [facility] CATH LAB    AORTOGRAM WITH RUNOFF Bilateral 5/4/2017    Performed by Phoenix Ayers MD at [facility] CATH LAB    UGIUST-[id number] Right 3/4/2016    Performed by Aris Finney Jr., MD at [facility] OR       Review of patient's allergies indicates:  No Known Allergies    No current facility-administered medications on file prior to encounter.      Current Outpatient Medications on File Prior to Encounter   Medication Sig    besifloxacin (BESIVANCE) 0.6 % DrpS 1 drop 3 (three) times daily.    difluprednate (DUREZOL) 0.05 % Drop ophthalmic solution 1 drop 4 (four) times daily.    nepafenac (ILEVRO) 0.3 % DrpS Apply 1 drop to eye once daily.    allopurinol (ZYLOPRIM) 100 MG tablet Take 100 mg by mouth once daily.    amLODIPine (NORVASC) 10 MG tablet TAKE 1 TABLET(10 MG) BY MOUTH EVERY DAY    atorvastatin (LIPITOR) 40 MG tablet TAKE 1 TABLET BY MOUTH ONCE DAILY    benazepril (LOTENSIN) 20 MG tablet TAKE 1 TABLET(20 MG) BY MOUTH EVERY DAY     carvedilol (COREG) 6.25 MG tablet TAKE 1 TABLET(6.25 MG) BY MOUTH TWICE DAILY    clopidogrel (PLAVIX) 75 mg tablet TAKE 1 TABLET BY MOUTH ONCE DAILY.    clopidogrel (PLAVIX) 75 mg tablet TAKE 1 TABLET BY MOUTH ONCE DAILY.    tamsulosin (FLOMAX) 0.4 mg Cp24 Take 0.4 mg by mouth once daily.     [DISCONTINUED] aspirin (ECOTRIN) 81 MG EC tablet Take 1 tablet (81 mg total) by mouth once daily.    [DISCONTINUED] vitamin D 1000 units Tab Take 2,000 Units by mouth once daily.    [DISCONTINUED] VITAMIN D2 50,000 unit capsule Take 50,000 Units by mouth every 7 days.      Family History     None        Tobacco Use    Smoking status: Former Smoker     Packs/day: 0.75     Years: 46.00     Pack years: 34.50     Start date: 1/1/1970     Last attempt to quit: 3/1/2016     Years since quitting: 3.1    Smokeless tobacco: Never Used   Substance and Sexual Activity    Alcohol use: No     Alcohol/week: 0.0 oz    Drug use: Not on file    Sexual activity: Not on file     Review of Systems   Constitutional: Positive for activity change, appetite change and fatigue. Negative for fever.   HENT: Negative for congestion, ear pain and postnasal drip.    Eyes: Negative for discharge.   Respiratory: Positive for shortness of breath. Negative for apnea and wheezing.    Cardiovascular: Negative for chest pain and leg swelling.   Gastrointestinal: Positive for vomiting (bloody). Negative for abdominal distention, abdominal pain and nausea.   Endocrine: Negative for polydipsia, polyphagia and polyuria.   Genitourinary: Negative for difficulty urinating, flank pain, frequency, hematuria and urgency.   Musculoskeletal: Positive for arthralgias and myalgias. Negative for joint swelling.   Skin: Negative for pallor and rash.   Allergic/Immunologic: Negative for environmental allergies and food allergies.   Neurological: Positive for dizziness, weakness and light-headedness. Negative for speech difficulty and headaches.   Hematological:  Does not bruise/bleed easily.   Psychiatric/Behavioral: Negative for agitation.     Objective:     Vital Signs (Most Recent):  Pulse: 78 (04/26/19 2303)  Resp: 12 (04/26/19 2303)  BP: (!) 109/57 (04/26/19 2353)  SpO2: 100 % (04/26/19 2303) Vital Signs (24h Range):  Pulse:  [70-80] 78  Resp:  [12-19] 12  SpO2:  [100 %] 100 %  BP: ()/(46-60) 109/57        There is no height or weight on file to calculate BMI.    Physical Exam   Constitutional: He is oriented to person, place, and time. He appears well-developed and well-nourished.   HENT:   Head: Normocephalic.   Eyes: Conjunctivae are normal.   Neck: Normal range of motion. Neck supple.   Cardiovascular: Normal rate, regular rhythm, normal heart sounds and intact distal pulses.   Pulmonary/Chest: Effort normal and breath sounds normal.   Abdominal: Soft. He exhibits no distension. Bowel sounds are increased. There is no tenderness.   Musculoskeletal: Normal range of motion.   Neurological: He is alert and oriented to person, place, and time. He has normal strength. GCS eye subscore is 4. GCS verbal subscore is 5. GCS motor subscore is 6.   Skin: Skin is warm and dry.   Psychiatric: He has a normal mood and affect. His speech is normal and behavior is normal.           Significant Labs:   CBC:   Recent Labs   Lab 04/26/19 2122   WBC 12.14   HGB 10.3*   HCT 32.4*        CMP:   Recent Labs   Lab 04/26/19 2122      K 4.5      CO2 22*   *   BUN 25*   CREATININE 1.5*   CALCIUM 8.8   PROT 5.7*   ALBUMIN 3.0*   BILITOT 0.5   ALKPHOS 82   AST 11   ALT 9*   ANIONGAP 9   EGFRNONAA 46*       Significant Imaging: I have reviewed all pertinent imaging results/findings within the past 24 hours.

## 2019-04-27 NOTE — H&P
"[facility] Hospital Medicine  History & Physical    Patient Name: Brooks Canas  MRN: [id number]  Admission Date: 4/26/2019  Attending Physician: Yobani Dolan MD   Primary Care Provider: Conro Olivares MD         Patient information was obtained from patient, past medical records and ER records.     Subjective:     Principal Problem:Acute upper GI bleed    Chief Complaint:   Chief Complaint   Patient presents with    Hematemesis     X 20 min, + blood thinners        HPI: The patient is a 68 y.o. male with hx of PAD, on Plavix who presents with complaint of hematemesis, onset 20 minutes PTA. Pt reports multiple episodes of emesis with bright red blood. He has associated abdominal "fullness," lightheadedness, and generalized weakness. He states that he felt lightheaded when getting off stretcher to walk to ED bed. Pt also states that in the past few days he has felt like his food has been getting stuck when swallow, sensation typically resolved without intervention. Pt denies any fever, chills, diarrhea, abdominal pain, chest pain, SOB, back pain, dizziness, extremity weakness, urinary sx, and rash. Pt reports no hx of drinking. He did not take his daily ASA today, but did take Plavix.        Past Medical History:   Diagnosis Date    BPH (benign prostatic hyperplasia)     Chronic kidney disease, stage III (moderate) 3/1/2016    12/10/2015: BUN/[facility]a: 32/1.63. CrCl 43.    Claudication in peripheral vascular disease     History of tobacco use 11/4/2016    1970: Began smoking.   3/1/2016: Quit smoking.    Hypertension, benign 3/1/2016    2012: Diagnosed.    Hyperuricemia     Leg swelling 3/14/2016    3/4/2016: Began having right leg edema.    PAD (peripheral artery disease)     Peripheral artery disease 3/1/2016    2014: Began experience bilateral calf claudication.  1/2016: [facility]: Arterial Duplex: Right: SFA: distal severe. RAVI 0.78. Left: SFA: mid occlusion. RAVI 0.65. 2/1/2016: " Began experience pain in right great toe.    Tobacco use 3/1/2016    1970: Began smoking. Unable to quit.    Vitamin D deficiency disease        Past Surgical History:   Procedure Laterality Date    ANGIOGRAM-EXTREMITY-LOWER Bilateral 3/2/2016    Performed by Phoenix Ayers MD at [facility] CATH LAB    AORTOGRAM WITH RUNOFF Bilateral 5/4/2017    Performed by Phoenix Ayers MD at [facility] CATH LAB    [id number]-[id number]-[id number] Right 3/4/2016    Performed by Aris Finney Jr., MD at [facility] OR       Review of patient's allergies indicates:  No Known Allergies    No current facility-administered medications on file prior to encounter.      Current Outpatient Medications on File Prior to Encounter   Medication Sig    besifloxacin (BESIVANCE) 0.6 % DrpS 1 drop 3 (three) times daily.    difluprednate (DUREZOL) 0.05 % Drop ophthalmic solution 1 drop 4 (four) times daily.    nepafenac (ILEVRO) 0.3 % DrpS Apply 1 drop to eye once daily.    allopurinol (ZYLOPRIM) 100 MG tablet Take 100 mg by mouth once daily.    amLODIPine (NORVASC) 10 MG tablet TAKE 1 TABLET(10 MG) BY MOUTH EVERY DAY    atorvastatin (LIPITOR) 40 MG tablet TAKE 1 TABLET BY MOUTH ONCE DAILY    benazepril (LOTENSIN) 20 MG tablet TAKE 1 TABLET(20 MG) BY MOUTH EVERY DAY    carvedilol (COREG) 6.25 MG tablet TAKE 1 TABLET(6.25 MG) BY MOUTH TWICE DAILY    clopidogrel (PLAVIX) 75 mg tablet TAKE 1 TABLET BY MOUTH ONCE DAILY.    clopidogrel (PLAVIX) 75 mg tablet TAKE 1 TABLET BY MOUTH ONCE DAILY.    tamsulosin (FLOMAX) 0.4 mg Cp24 Take 0.4 mg by mouth once daily.     [DISCONTINUED] aspirin (ECOTRIN) 81 MG EC tablet Take 1 tablet (81 mg total) by mouth once daily.    [DISCONTINUED] vitamin D 1000 units Tab Take 2,000 Units by mouth once daily.    [DISCONTINUED] VITAMIN D2 50,000 unit capsule Take 50,000 Units by mouth every 7 days.      Family History     None        Tobacco Use    Smoking status: Former Smoker     Packs/day: 0.75     Years: 46.00     Pack years:  34.50     Start date: 1/1/1970     Last attempt to quit: 3/1/2016     Years since quitting: 3.1    Smokeless tobacco: Never Used   Substance and Sexual Activity    Alcohol use: No     Alcohol/week: 0.0 oz    Drug use: Not on file    Sexual activity: Not on file     Review of Systems   Constitutional: Positive for activity change, appetite change and fatigue. Negative for fever.   HENT: Negative for congestion, ear pain and postnasal drip.    Eyes: Negative for discharge.   Respiratory: Positive for shortness of breath. Negative for apnea and wheezing.    Cardiovascular: Negative for chest pain and leg swelling.   Gastrointestinal: Positive for vomiting (bloody). Negative for abdominal distention, abdominal pain and nausea.   Endocrine: Negative for polydipsia, polyphagia and polyuria.   Genitourinary: Negative for difficulty urinating, flank pain, frequency, hematuria and urgency.   Musculoskeletal: Positive for arthralgias and myalgias. Negative for joint swelling.   Skin: Negative for pallor and rash.   Allergic/Immunologic: Negative for environmental allergies and food allergies.   Neurological: Positive for dizziness, weakness and light-headedness. Negative for speech difficulty and headaches.   Hematological: Does not bruise/bleed easily.   Psychiatric/Behavioral: Negative for agitation.     Objective:     Vital Signs (Most Recent):  Pulse: 78 (04/26/19 2303)  Resp: 12 (04/26/19 2303)  BP: (!) 109/57 (04/26/19 2353)  SpO2: 100 % (04/26/19 2303) Vital Signs (24h Range):  Pulse:  [70-80] 78  Resp:  [12-19] 12  SpO2:  [100 %] 100 %  BP: ()/(46-60) 109/57        There is no height or weight on file to calculate BMI.    Physical Exam   Constitutional: He is oriented to person, place, and time. He appears well-developed and well-nourished.   HENT:   Head: Normocephalic.   Eyes: Conjunctivae are normal.   Neck: Normal range of motion. Neck supple.   Cardiovascular: Normal rate, regular rhythm, normal heart  sounds and intact distal pulses.   Pulmonary/Chest: Effort normal and breath sounds normal.   Abdominal: Soft. He exhibits no distension. Bowel sounds are increased. There is no tenderness.   Musculoskeletal: Normal range of motion.   Neurological: He is alert and oriented to person, place, and time. He has normal strength. GCS eye subscore is 4. GCS verbal subscore is 5. GCS motor subscore is 6.   Skin: Skin is warm and dry.   Psychiatric: He has a normal mood and affect. His speech is normal and behavior is normal.           Significant Labs:   CBC:   Recent Labs   Lab 04/26/19 2122   WBC 12.14   HGB 10.3*   HCT 32.4*        CMP:   Recent Labs   Lab 04/26/19 2122      K 4.5      CO2 22*   *   BUN 25*   CREATININE 1.5*   CALCIUM 8.8   PROT 5.7*   ALBUMIN 3.0*   BILITOT 0.5   ALKPHOS 82   AST 11   ALT 9*   ANIONGAP 9   EGFRNONAA 46*       Significant Imaging: I have reviewed all pertinent imaging results/findings within the past 24 hours.    Assessment/Plan:     * Acute upper GI bleed  Patient having multiple bright red blood emesis episodes. Hypotensive after.    Protonix/Sandostatin IV  NPO  Consult GI  Hold Plavix    Chronic kidney disease, stage III (moderate)  Creatinine- 1.5, baseline- 1.4-1.6    CMP daily, monitor for worsening      Hypertension  Hypotensive currently    Hold currently while bleeding        VTE Risk Mitigation (From admission, onward)    None             Clyde Thompson NP  Department of Hospital Medicine   [facility]

## 2019-04-27 NOTE — PLAN OF CARE
Problem: Adult Inpatient Plan of Care  Goal: Plan of Care Review  Outcome: Ongoing (interventions implemented as appropriate)  Patient is awake, alert, oriented and afebrile. On room air, not in respiratory distress. Cardiac monitor showed NSR on scope. H/H count 7.7/24.3 relayed to NP (Hospital Medicine), 2 units PRBC prepared and 1 unit PRBC transfused as ordered. Patient verbalized recent cataract removal surgery yesterday and has post op eye drops, informed moonlighter and acknowledged. Provided rest and comfort. Free from fall. Patient was educated. Will continue to monitor.

## 2019-04-27 NOTE — NURSING
"Patient picked up by [facility]. Patient in no apparent distress upon transfer. Eye drops (home med) sent with "Lencho" per patient request   "

## 2019-04-27 NOTE — PROGRESS NOTES
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 Partially circumferential with a fungating appearance at the GE junction. The fungating area appeared to have bled, and it had a somewhat necrotic appearance. Biopsies were obtained from the more proximal tumor. Only 4 biopsies obtained due to significant oozing. Oozing appeared to have stopped at the end of the exam, but    Recommend  Transfer to [facility] for evaluation by surgery and or IR

## 2019-04-27 NOTE — ANESTHESIA PREPROCEDURE EVALUATION
04/27/2019  Brooks Canas is a 68 y.o., male.    Anesthesia Evaluation    I have reviewed the Patient Summary Reports.    I have reviewed the Nursing Notes.   I have reviewed the Medications.     Review of Systems  Anesthesia Hx:  No problems with previous Anesthesia  Denies Family Hx of Anesthesia complications.   Denies Personal Hx of Anesthesia complications.   Social:  Non-Smoker    Hematology/Oncology:     Oncology Normal    -- Anemia: Hematology Comments: Received packed cells last night    EENT/Dental:EENT/Dental Normal   Cardiovascular:   Hypertension PVD On Plavix   Pulmonary:  Pulmonary Normal    Renal/:  Renal/ Normal     Hepatic/GI:  Hepatic/GI Normal GI bleed   Musculoskeletal:  Musculoskeletal Normal    Neurological:  Neurology Normal    Endocrine:  Endocrine Normal    Dermatological:  Skin Normal    Psych:  Psychiatric Normal           Physical Exam  General:  Well nourished    Airway/Jaw/Neck:  Airway Findings: Mouth Opening: Normal Mallampati: II      Dental:  Dental Findings: Edentulous        Mental Status:  Mental Status Findings:  Cooperative, Alert and Oriented         Anesthesia Plan  Type of Anesthesia, risks & benefits discussed:  Anesthesia Type:  general  Patient's Preference:   Intra-op Monitoring Plan: standard ASA monitors  Intra-op Monitoring Plan Comments:   Post Op Pain Control Plan:   Post Op Pain Control Plan Comments:   Induction:   IV  Beta Blocker:         Informed Consent: Patient understands risks and agrees with Anesthesia plan.  Questions answered. Anesthesia consent signed with patient.  ASA Score: 3  emergent   Day of Surgery Review of History & Physical:    H&P update referred to the surgeon.     Anesthesia Plan Notes: Propofol IV        Ready For Surgery From Anesthesia Perspective.

## 2019-04-27 NOTE — ED PROVIDER NOTES
"Encounter Date: 4/26/2019    SCRIBE #1 NOTE: I, Ivory Rosales, am scribing for, and in the presence of, Dr. Dolan.       History     Chief Complaint   Patient presents with    Hematemesis     X 20 min, + blood thinners     Time seen by provider: 9:16 PM    This is a 68 y.o. male with hx of PAD, on Plavix who presents with complaint of hematemesis, onset 20 minutes PTA. Pt reports multiple episodes of emesis with bright red blood. He has associated abdominal "fullness," lightheadedness, and generalized weakness. He states that he felt lightheaded when getting off stretcher to walk to ED bed. Pt also states that in the past few days he has felt like his food has been getting stuck when swallow, sensation typically resolved without intervention. Pt denies any fever, chills, diarrhea, abdominal pain, chest pain, SOB, back pain, dizziness, extremity weakness, urinary sx, and rash. Pt reports no hx of drinking. He did not take his daily ASA today, but did take Plavix.    The history is provided by the patient.     Review of patient's allergies indicates:  No Known Allergies  Past Medical History:   Diagnosis Date    BPH (benign prostatic hyperplasia)     Chronic kidney disease, stage III (moderate) 3/1/2016    12/10/2015: BUN/crea: 32/1.63. CrCl 43.    Claudication in peripheral vascular disease     History of tobacco use 11/4/2016    1970: Began smoking.   3/1/2016: Quit smoking.    Hypertension, benign 3/1/2016    2012: Diagnosed.    Hyperuricemia     Leg swelling 3/14/2016    3/4/2016: Began having right leg edema.    PAD (peripheral artery disease)     Peripheral artery disease 3/1/2016    2014: Began experience bilateral calf claudication.  1/2016: [facility]: Arterial Duplex: Right: SFA: distal severe. RAVI 0.78. Left: SFA: mid occlusion. RAVI 0.65. 2/1/2016: Began experience pain in right great toe.    Tobacco use 3/1/2016    1970: Began smoking. Unable to quit.    Vitamin D deficiency disease      Past " Surgical History:   Procedure Laterality Date    ANGIOGRAM-EXTREMITY-LOWER Bilateral 3/2/2016    Performed by Phoenix Ayers MD at [facility] CATH LAB    AORTOGRAM WITH RUNOFF Bilateral 5/4/2017    Performed by Phoenix Ayers MD at [facility] CATH LAB    XQDKKB-LYYRPDX-VJLPMGASP Right 3/4/2016    Performed by Aris Finney Jr., MD at [facility] OR     History reviewed. No pertinent family history.  Social History     Tobacco Use    Smoking status: Former Smoker     Packs/day: 0.75     Years: 46.00     Pack years: 34.50     Start date: 1/1/1970     Last attempt to quit: 3/1/2016     Years since quitting: 3.1    Smokeless tobacco: Never Used   Substance Use Topics    Alcohol use: No     Alcohol/week: 0.0 oz    Drug use: Not on file     Review of Systems   Constitutional: Negative for chills and fever.   HENT: Negative for congestion, rhinorrhea and sore throat.    Eyes: Negative for visual disturbance.   Respiratory: Negative for cough and shortness of breath.    Cardiovascular: Negative for chest pain.   Gastrointestinal: Positive for nausea and vomiting. Negative for abdominal pain and diarrhea.        Positive for hematemesis.   Endocrine: Negative for polyuria.   Genitourinary: Negative for decreased urine volume, difficulty urinating, dysuria and hematuria.   Musculoskeletal: Negative for back pain.   Skin: Negative for rash and wound.   Allergic/Immunologic: Negative for immunocompromised state.   Neurological: Positive for weakness (generalized, negative for extremity weakness) and light-headedness. Negative for dizziness, numbness and headaches.   Hematological: Does not bruise/bleed easily.   Psychiatric/Behavioral: Negative for agitation and confusion.   All other systems reviewed and are negative.      Physical Exam     Initial Vitals   BP Pulse Resp Temp SpO2   04/26/19 2124 04/26/19 2124 04/26/19 2133 -- 04/26/19 2124   135/60 74 13  100 %      MAP       --                Physical Exam    Nursing note and vitals  reviewed.  Constitutional: He appears well-developed and well-nourished. He is not diaphoretic. No distress.   Upon standing and ambulating to bedside patient became weak and lightheaded   HENT:   Head: Normocephalic and atraumatic.   Right Ear: External ear normal.   Left Ear: External ear normal.   Nose: Nose normal.   Eyes: Conjunctivae and EOM are normal. Pupils are equal, round, and reactive to light. Right eye exhibits no discharge. Left eye exhibits no discharge.   Neck: Normal range of motion. Neck supple. No JVD present.   Cardiovascular: Normal rate, regular rhythm, normal heart sounds and intact distal pulses. Exam reveals no gallop and no friction rub.    No murmur heard.  Pulses:       Dorsalis pedis pulses are 1+ on the right side, and 1+ on the left side.   Pulmonary/Chest: Breath sounds normal. No respiratory distress. He has no wheezes. He has no rhonchi. He has no rales. He exhibits no tenderness.   Abdominal: Soft. He exhibits no distension and no mass. There is no tenderness. There is no rebound and no guarding.   Decreased breath sounds   Musculoskeletal: Normal range of motion. He exhibits no edema or tenderness.   Lymphadenopathy:     He has no cervical adenopathy.   Neurological: He is alert and oriented to person, place, and time. He has normal strength. He displays normal reflexes. No cranial nerve deficit or sensory deficit.   Skin: Skin is warm and dry. Capillary refill takes less than 2 seconds. No rash noted. No erythema. There is pallor.   Psychiatric: He has a normal mood and affect. His behavior is normal. Judgment and thought content normal.         ED Course   Procedures  Labs Reviewed   CBC W/ AUTO DIFFERENTIAL - Abnormal; Notable for the following components:       Result Value    RBC 3.78 (*)     Hemoglobin 10.3 (*)     Hematocrit 32.4 (*)     MCHC 31.8 (*)     RDW 15.2 (*)     Gran # (ANC) 10.2 (*)     Gran% 84.2 (*)     Lymph% 9.1 (*)     All other components within normal  limits   COMPREHENSIVE METABOLIC PANEL - Abnormal; Notable for the following components:    CO2 22 (*)     Glucose 164 (*)     BUN, Bld 25 (*)     Creatinine 1.5 (*)     Total Protein 5.7 (*)     Albumin 3.0 (*)     ALT 9 (*)     eGFR if  54 (*)     eGFR if non  46 (*)     All other components within normal limits   LIPASE - Abnormal; Notable for the following components:    Lipase 76 (*)     All other components within normal limits   PROTIME-INR   APTT   TYPE & SCREEN     EKG Readings: (Independently Interpreted)   NSR at a rate of 65. No STEMI. Incomplete RBBB. When compared to previous, no significant changes.       Imaging Results          X-Ray Abdomen Flat And Erect (Final result)  Result time 04/26/19 21:59:29    Final result by Everardo Meneses MD (04/26/19 21:59:29)                 Impression:      Nonobstructive bowel gas pattern.      Electronically signed by: Everardo Meneses MD  Date:    04/26/2019  Time:    21:59             Narrative:    EXAMINATION:  XR ABDOMEN FLAT AND ERECT    CLINICAL HISTORY:  GI Bleeding;    TECHNIQUE:  Flat and erect AP views of the abdomen were performed.    COMPARISON:  None    FINDINGS:  Monitoring leads overlie the abdomen.  Nonobstructive bowel gas pattern.  No organomegaly or significant mass effect.  No large amount of free air or abnormal calcification projected over the collecting systems.  Scattered atherosclerotic vascular calcifications noted.  Included lung bases are clear.  Surgical clips project over the right inguinal region.  Osseous structures show age-related mild degenerative change without acute or destructive process seen.                              X-Rays:   Independently Interpreted Readings:   Abdomen:   Flat and Erect of Abdomen - No free air. Non specific bowel gas pattern. No foreign bodies.      Medical Decision Making:   Differential Diagnosis:   Differential diagnosis includes upper GI bleed, lower GI bleed,  esophageal varices, coagulopathy, Boerhaave syndrome  Independently Interpreted Test(s):   I have ordered and independently interpreted X-rays - see prior notes.  I have ordered and independently interpreted EKG Reading(s) - see prior notes  Clinical Tests:   Lab Tests: Ordered and Reviewed  Radiological Study: Ordered and Reviewed  Medical Tests: Ordered and Reviewed  ED Management:  Case discussed with Hospitalist medicine, will follow in hospital.  Requested admit to Dr. Shultz.  Will continue Protonix drip and NPO.  Patient consented for blood transfusion but may as precaution.  Will follow serial H&H.            Scribe Attestation:   Scribe #1: I performed the above scribed service and the documentation accurately describes the services I performed. I attest to the accuracy of the note.    Attending Attestation:           Physician Attestation for Scribe:  Physician Attestation Statement for Scribe #1: I, Dr. Dolan, reviewed documentation, as scribed by Ivory Rosales in my presence, and it is both accurate and complete.                    Clinical Impression:     1. Acute upper GI bleed    2. Weakness                                 Yobani Dolan MD  04/26/19 [id number]

## 2019-04-27 NOTE — ANESTHESIA POSTPROCEDURE EVALUATION
Anesthesia Post Evaluation    Patient: Brooks Canas    Procedure(s) Performed: Procedure(s) (LRB):  EGD (ESOPHAGOGASTRODUODENOSCOPY) (Left)    Final Anesthesia Type: general  Patient location during evaluation: ICU  Patient participation: Yes- Able to Participate  Level of consciousness: awake and alert  Post-procedure vital signs: reviewed and stable  Pain management: adequate  Airway patency: patent  PONV status at discharge: No PONV  Anesthetic complications: no      Cardiovascular status: blood pressure returned to baseline  Respiratory status: nasal cannula  Hydration status: euvolemic  Follow-up not needed.          Vitals Value Taken Time   /54 4/27/2019  2:12 PM   Temp 36.8 °C (98.3 °F) 4/27/2019 11:00 AM   Pulse 82 4/27/2019  2:13 PM   Resp 20 4/27/2019  2:13 PM   SpO2 97 % 4/27/2019  2:13 PM   Vitals shown include unvalidated device data.      No case tracking events are documented in the log.      Pain/Saji Score: No data recorded

## 2019-04-27 NOTE — PROGRESS NOTES
"[facility] Hospital Medicine  Progress Note    Patient Name: Brooks Canas  MRN: [id number]  Patient Class: IP- Inpatient   Admission Date: 4/26/2019  Length of Stay: 1 days  Attending Physician: Aris Pablo MD  Primary Care Provider: Conor Olivares MD        Subjective:     Principal Problem:Acute upper GI bleed    HPI:  The patient is a 68 y.o. male with hx of PAD, on Plavix who presents with complaint of hematemesis, onset 20 minutes PTA. Pt reports multiple episodes of emesis with bright red blood. He has associated abdominal "fullness," lightheadedness, and generalized weakness. He states that he felt lightheaded when getting off stretcher to walk to ED bed. Pt also states that in the past few days he has felt like his food has been getting stuck when swallow, sensation typically resolved without intervention. Pt denies any fever, chills, diarrhea, abdominal pain, chest pain, SOB, back pain, dizziness, extremity weakness, urinary sx, and rash. Pt reports no hx of drinking. He did not take his daily ASA today, but did take Plavix.        Hospital Course:  No notes on file    Interval History: No further hematemesis.  Denies cp and sob.  Notes abdominal distention but no pain.  Notes recent feeling of food getting stuck in throat with odynophagia    Review of Systems   Constitutional: Negative for activity change and appetite change.   HENT: Negative for congestion and dental problem.    Eyes: Negative for discharge and itching.   Respiratory: Negative for apnea, chest tightness and shortness of breath.    Cardiovascular: Negative for chest pain and leg swelling.   Gastrointestinal: Positive for abdominal distention and vomiting. Negative for abdominal pain, anal bleeding, blood in stool, constipation, diarrhea, nausea and rectal pain.   Endocrine: Negative for cold intolerance.   Genitourinary: Negative for difficulty urinating and dysuria.   Musculoskeletal: Negative for " arthralgias and back pain.   Allergic/Immunologic: Negative for environmental allergies.   Neurological: Negative for dizziness and facial asymmetry.   Hematological: Negative for adenopathy. Does not bruise/bleed easily.   Psychiatric/Behavioral: Negative for agitation and behavioral problems.     Objective:     Vital Signs (Most Recent):  Temp: 98.4 °F (36.9 °C) (04/27/19 0700)  Pulse: 78 (04/27/19 0830)  Resp: (!) 21 (04/27/19 0830)  BP: (!) 161/74 (04/27/19 0830)  SpO2: 98 % (04/27/19 0830) Vital Signs (24h Range):  Temp:  [98.4 °F (36.9 °C)-98.7 °F (37.1 °C)] 98.4 °F (36.9 °C)  Pulse:  [70-88] 78  Resp:  [12-26] 21  SpO2:  [95 %-100 %] 98 %  BP: ()/(46-80) 161/74     Weight: 81 kg (178 lb 9.2 oz)  Body mass index is 25.62 kg/m².    Intake/Output Summary (Last 24 hours) at 4/27/2019 1058  Last data filed at 4/27/2019 0642  Gross per 24 hour   Intake 871.33 ml   Output 450 ml   Net 421.33 ml      Physical Exam   Constitutional: He is oriented to person, place, and time. He appears well-developed and well-nourished.   HENT:   Head: Normocephalic and atraumatic.   Eyes: Pupils are equal, round, and reactive to light. EOM are normal.   Neck: Normal range of motion. Neck supple.   Cardiovascular: Normal rate, regular rhythm and normal heart sounds.   Pulmonary/Chest: Effort normal and breath sounds normal. No respiratory distress.   Abdominal: Soft. Bowel sounds are normal. He exhibits no distension. There is no tenderness.   S/NT/mild distention, bowel sounds present but diminished   Musculoskeletal: Normal range of motion. He exhibits no edema.   Neurological: He is oriented to person, place, and time. No cranial nerve deficit. Coordination normal.   Skin: Skin is warm and dry.   Psychiatric: He has a normal mood and affect. His behavior is normal.   Vitals reviewed.      Significant Labs: All pertinent labs within the past 24 hours have been reviewed.    Significant Imaging: I have reviewed and interpreted  all pertinent imaging results/findings within the past 24 hours.    Assessment/Plan:      * Acute upper GI bleed  -Mr. Canas is admitted to inpatient status in our ICU.  -Recently noted to have odynophagia with feeling of food stuck in his chest.  -He had multiple episodes of bright red blood emesis after which he was hypotensive.  -Denies NSAID use.  Denies recent abdominal pain.  No known evidence of portal hypertension so varices would seem less likely.  -Most likely this is due to PUD or gastritis.  -He has been started on protonix and sandostatin drips.  No evidence of portal hypertension so would stop sandostatin if ok with GI  -Continue H/H q6h.  Transfuse for Hb < 7.0  -Hold aspirin and plavix.  -Continue NPO  -Await GI consult.    Acute blood loss anemia  -Due to GI bleeding  -Received 1 unit PRBC this morning  -Check iron, ferritin, b12 and folate  -Repeat h/h q6h.      Hypomagnesemia  -Replace magnesium today.  -Repeat labs in AM.        Chronic kidney disease, stage III (moderate)  -Baseline Cr 1.4-1.6  -presently in that range  -Repeat labs in AM.    Hypertension  -Continue to hold home oral meds  -Order hydralazine PRN SBP > 170        VTE Risk Mitigation (From admission, onward)        Ordered     Place CHARLES hose  Until discontinued      04/27/19 0120     Place sequential compression device  Until discontinued      04/27/19 0120     IP VTE HIGH RISK PATIENT  Once      04/27/19 0120     Reason for No Pharmacological VTE Prophylaxis  Once      04/27/19 0120              Aris Pablo MD  Department of Hospital Medicine   [facility]

## 2019-04-28 PROBLEM — K22.89 ESOPHAGEAL MASS: Status: ACTIVE | Noted: 2019-04-28

## 2019-04-28 LAB
ALBUMIN SERPL BCP-MCNC: 2.6 G/DL (ref 3.5–5.2)
ALP SERPL-CCNC: 54 U/L (ref 55–135)
ALT SERPL W/O P-5'-P-CCNC: 5 U/L (ref 10–44)
ANION GAP SERPL CALC-SCNC: 6 MMOL/L (ref 8–16)
AST SERPL-CCNC: 11 U/L (ref 10–40)
BASOPHILS # BLD AUTO: 0.03 K/UL (ref 0–0.2)
BASOPHILS # BLD AUTO: 0.03 K/UL (ref 0–0.2)
BASOPHILS # BLD AUTO: 0.04 K/UL (ref 0–0.2)
BASOPHILS NFR BLD: 0.5 % (ref 0–1.9)
BASOPHILS NFR BLD: 0.5 % (ref 0–1.9)
BASOPHILS NFR BLD: 0.6 % (ref 0–1.9)
BILIRUB SERPL-MCNC: 0.3 MG/DL (ref 0.1–1)
BUN SERPL-MCNC: 45 MG/DL (ref 8–23)
CALCIUM SERPL-MCNC: 8 MG/DL (ref 8.7–10.5)
CHLORIDE SERPL-SCNC: 115 MMOL/L (ref 95–110)
CO2 SERPL-SCNC: 19 MMOL/L (ref 23–29)
CREAT SERPL-MCNC: 1.3 MG/DL (ref 0.5–1.4)
DIFFERENTIAL METHOD: ABNORMAL
EOSINOPHIL # BLD AUTO: 0 K/UL (ref 0–0.5)
EOSINOPHIL # BLD AUTO: 0 K/UL (ref 0–0.5)
EOSINOPHIL # BLD AUTO: 0.1 K/UL (ref 0–0.5)
EOSINOPHIL NFR BLD: 0.3 % (ref 0–8)
EOSINOPHIL NFR BLD: 0.5 % (ref 0–8)
EOSINOPHIL NFR BLD: 0.9 % (ref 0–8)
ERYTHROCYTE [DISTWIDTH] IN BLOOD BY AUTOMATED COUNT: 15.3 % (ref 11.5–14.5)
ERYTHROCYTE [DISTWIDTH] IN BLOOD BY AUTOMATED COUNT: 15.4 % (ref 11.5–14.5)
ERYTHROCYTE [DISTWIDTH] IN BLOOD BY AUTOMATED COUNT: 15.5 % (ref 11.5–14.5)
EST. GFR  (AFRICAN AMERICAN): >60 ML/MIN/1.73 M^2
EST. GFR  (NON AFRICAN AMERICAN): 55.3 ML/MIN/1.73 M^2
GLUCOSE SERPL-MCNC: 88 MG/DL (ref 70–110)
HCT VFR BLD AUTO: 23.2 % (ref 40–54)
HCT VFR BLD AUTO: 23.9 % (ref 40–54)
HCT VFR BLD AUTO: 24 % (ref 40–54)
HGB BLD-MCNC: 7.6 G/DL (ref 14–18)
HGB BLD-MCNC: 7.6 G/DL (ref 14–18)
HGB BLD-MCNC: 7.8 G/DL (ref 14–18)
IMM GRANULOCYTES # BLD AUTO: 0.02 K/UL (ref 0–0.04)
IMM GRANULOCYTES # BLD AUTO: 0.03 K/UL (ref 0–0.04)
IMM GRANULOCYTES # BLD AUTO: 0.03 K/UL (ref 0–0.04)
IMM GRANULOCYTES NFR BLD AUTO: 0.3 % (ref 0–0.5)
IMM GRANULOCYTES NFR BLD AUTO: 0.5 % (ref 0–0.5)
IMM GRANULOCYTES NFR BLD AUTO: 0.5 % (ref 0–0.5)
LYMPHOCYTES # BLD AUTO: 1.1 K/UL (ref 1–4.8)
LYMPHOCYTES # BLD AUTO: 1.3 K/UL (ref 1–4.8)
LYMPHOCYTES # BLD AUTO: 1.4 K/UL (ref 1–4.8)
LYMPHOCYTES NFR BLD: 16 % (ref 18–48)
LYMPHOCYTES NFR BLD: 21.5 % (ref 18–48)
LYMPHOCYTES NFR BLD: 22.6 % (ref 18–48)
MAGNESIUM SERPL-MCNC: 1.7 MG/DL (ref 1.6–2.6)
MCH RBC QN AUTO: 27.8 PG (ref 27–31)
MCH RBC QN AUTO: 27.8 PG (ref 27–31)
MCH RBC QN AUTO: 28.2 PG (ref 27–31)
MCHC RBC AUTO-ENTMCNC: 31.8 G/DL (ref 32–36)
MCHC RBC AUTO-ENTMCNC: 32.5 G/DL (ref 32–36)
MCHC RBC AUTO-ENTMCNC: 32.8 G/DL (ref 32–36)
MCV RBC AUTO: 85 FL (ref 82–98)
MCV RBC AUTO: 87 FL (ref 82–98)
MCV RBC AUTO: 88 FL (ref 82–98)
MONOCYTES # BLD AUTO: 0.4 K/UL (ref 0.3–1)
MONOCYTES # BLD AUTO: 0.5 K/UL (ref 0.3–1)
MONOCYTES # BLD AUTO: 0.6 K/UL (ref 0.3–1)
MONOCYTES NFR BLD: 6.5 % (ref 4–15)
MONOCYTES NFR BLD: 8.7 % (ref 4–15)
MONOCYTES NFR BLD: 9.6 % (ref 4–15)
NEUTROPHILS # BLD AUTO: 4 K/UL (ref 1.8–7.7)
NEUTROPHILS # BLD AUTO: 4.2 K/UL (ref 1.8–7.7)
NEUTROPHILS # BLD AUTO: 5 K/UL (ref 1.8–7.7)
NEUTROPHILS NFR BLD: 66.5 % (ref 38–73)
NEUTROPHILS NFR BLD: 68.1 % (ref 38–73)
NEUTROPHILS NFR BLD: 75.9 % (ref 38–73)
NRBC BLD-RTO: 0 /100 WBC
PLATELET # BLD AUTO: 125 K/UL (ref 150–350)
PMV BLD AUTO: 10.6 FL (ref 9.2–12.9)
PMV BLD AUTO: 10.7 FL (ref 9.2–12.9)
PMV BLD AUTO: 10.8 FL (ref 9.2–12.9)
POTASSIUM SERPL-SCNC: 3.7 MMOL/L (ref 3.5–5.1)
PROT SERPL-MCNC: 4.5 G/DL (ref 6–8.4)
RBC # BLD AUTO: 2.73 M/UL (ref 4.6–6.2)
RBC # BLD AUTO: 2.73 M/UL (ref 4.6–6.2)
RBC # BLD AUTO: 2.77 M/UL (ref 4.6–6.2)
SODIUM SERPL-SCNC: 140 MMOL/L (ref 136–145)
WBC # BLD AUTO: 5.86 K/UL (ref 3.9–12.7)
WBC # BLD AUTO: 6.27 K/UL (ref 3.9–12.7)
WBC # BLD AUTO: 6.63 K/UL (ref 3.9–12.7)

## 2019-04-28 PROCEDURE — 80053 COMPREHEN METABOLIC PANEL: CPT

## 2019-04-28 PROCEDURE — 63600175 PHARM REV CODE 636 W HCPCS: Performed by: EMERGENCY MEDICINE

## 2019-04-28 PROCEDURE — 99233 SBSQ HOSP IP/OBS HIGH 50: CPT | Mod: ,,, | Performed by: HOSPITALIST

## 2019-04-28 PROCEDURE — 83735 ASSAY OF MAGNESIUM: CPT

## 2019-04-28 PROCEDURE — 36415 COLL VENOUS BLD VENIPUNCTURE: CPT

## 2019-04-28 PROCEDURE — 20600001 HC STEP DOWN PRIVATE ROOM

## 2019-04-28 PROCEDURE — 25000003 PHARM REV CODE 250: Performed by: INTERNAL MEDICINE

## 2019-04-28 PROCEDURE — 85025 COMPLETE CBC W/AUTO DIFF WBC: CPT | Mod: 91

## 2019-04-28 PROCEDURE — 25500020 PHARM REV CODE 255: Performed by: HOSPITALIST

## 2019-04-28 PROCEDURE — C9113 INJ PANTOPRAZOLE SODIUM, VIA: HCPCS | Performed by: EMERGENCY MEDICINE

## 2019-04-28 PROCEDURE — 99233 PR SUBSEQUENT HOSPITAL CARE,LEVL III: ICD-10-PCS | Mod: ,,, | Performed by: HOSPITALIST

## 2019-04-28 RX ADMIN — SODIUM CHLORIDE, SODIUM LACTATE, POTASSIUM CHLORIDE, AND CALCIUM CHLORIDE 100 ML/HR: 600; 310; 30; 20 INJECTION, SOLUTION INTRAVENOUS at 07:04

## 2019-04-28 RX ADMIN — SODIUM CHLORIDE, SODIUM LACTATE, POTASSIUM CHLORIDE, AND CALCIUM CHLORIDE 100 ML/HR: 600; 310; 30; 20 INJECTION, SOLUTION INTRAVENOUS at 04:04

## 2019-04-28 RX ADMIN — PANTOPRAZOLE SODIUM 8 MG/HR: 40 INJECTION, POWDER, FOR SOLUTION INTRAVENOUS at 12:04

## 2019-04-28 RX ADMIN — PANTOPRAZOLE SODIUM 8 MG/HR: 40 INJECTION, POWDER, FOR SOLUTION INTRAVENOUS at 11:04

## 2019-04-28 RX ADMIN — IOHEXOL 75 ML: 350 INJECTION, SOLUTION INTRAVENOUS at 10:04

## 2019-04-28 RX ADMIN — PANTOPRAZOLE SODIUM 8 MG/HR: 40 INJECTION, POWDER, FOR SOLUTION INTRAVENOUS at 07:04

## 2019-04-28 RX ADMIN — PANTOPRAZOLE SODIUM 8 MG/HR: 40 INJECTION, POWDER, FOR SOLUTION INTRAVENOUS at 04:04

## 2019-04-28 NOTE — ASSESSMENT & PLAN NOTE
-Baseline Cr 1.4-1.6, currently 1.3/below baseline.  -Will trend, renally dose renally cleared medications.  -IVF while NPO, ice chips ok.

## 2019-04-28 NOTE — HPI
Brooks Canas is a 69 y.o. male with a h/o PAD (s/p Right fem-pop bypass with SVG in 2016; on Plavix), BPH, CKD stage III, HTN, hyperuricemia, and tobacco abuse who presents as a transfer from OSH for hematemesis.  He reports having dysphagia to solids that has progressively worsened over time.  He denies weight loss, appetite changes, fever, chills, nausea, vomiting, chest pain, shortness of breath.  He has been diagnosed with esophageal cancer this admission.  He is in need of enteral access for feeding.  He last took his Plavix 3 days ago.  His abdominal surgical history is significant for open appendectomy.

## 2019-04-28 NOTE — PROGRESS NOTES
Patient slept well and remains asleep majority of this shift.  He denies pain, nausea, and presents as pleasant and A&Ox4, independent with ADLs. Continuous infusions initiated and maintained per orders.  Safety maintained with call bell, urinal, and personal items within in reach.  His friend and roommate Wesley has remained at bedside since admission yesterday evening, supportive and participating in care.  Will give bedside report to oncoming shift.

## 2019-04-28 NOTE — ASSESSMENT & PLAN NOTE
Per endoscopy (4/27) report:   A large, fungating mass with no bleeding and stigmata of recent        bleeding was found in the lower third of the esophagus and at the        gastroesophageal junction, 32 cm from the incisors. The mass was        partially obstructing and partially circumferential (involving        one-half of the lumen circumference). Biopsies were taken with a        cold forceps for histology.    CTS consulted; appreciate assistance.   Oncology consult once path results; confirmed path specimens have been received by [facility] path lab.   NPO pending surgical evaluation

## 2019-04-28 NOTE — CARE UPDATE
Post-Transfer Note    Mr. Canas is a 58-year-old man with HTN and PAD on Plavix who was transferred from [facility] where he presented on 4/26 with hematemesis and odynophagia was admitted to their ICU with acute UGIB, complicated by hypotension. He was treated initially with pantoprazole and sandostatin infusions, narrowed to pantoprazole at GI's recommendation. He underwent EGD on 4/27 which showed a partially circumferential fungating esophageal mass near the GE junction, described as somewhat necrotic appearing. Biopsies were taken, with oozing noted from the friable mass which resolved by the end of the procedure. He was transferred here for additional GI and surgical evaluation.    On arrival here he is hemodynamically stable and without complaint. We discussed the findings and plan of care, and he had no questions. Orders reviewed and appropriate.    Joel Rand M.D.  Department of Hospital Medicine  [facility]  [phone] (pager)

## 2019-04-28 NOTE — HOSPITAL COURSE
He was treated initially with pantoprazole and sandostatin infusions, narrowed to pantoprazole at GI's recommendation. He underwent EGD on 4/27 which showed a partially circumferential fungating esophageal mass near the GE junction, described as somewhat necrotic appearing. Biopsies were taken, with oozing noted from the friable mass which resolved by the end of the procedure. [place] [facility] [place] surgical evaluation. GI was consulted and recommended AES consult for EUS. Gen Surg was also consulted for placement of J tube in anticipation for surgery. CTS consulted and planning on resection in roughly 8 weeks following neoadjuvant chemo (seen by Dr. Forrest in Oncology) and XRT (arranged by Dr. Morgan in RadOnc). Patient was tolerating diet at time of discharge and was discharged with HH orders and instructions for TF (based on % of meals consumed). He was also provided with instruction and contact numbers for his upcoming appointments.    For details on mgmt of IP problems, see below:     Acute upper GI bleed  Admitted to ICU @[facility]. 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  -Continued PPI  -H/H q12h.  Transfused for Hb < 8.0  -Held aspirin and plavix.  -Trial CLD, with advancement to soft diet. Tolerated well.      Esophageal mass  Per endoscopy (4/27) report:          A large, fungating mass with no bleeding and stigmata of recent        bleeding was found in the lower third of the esophagus and at the        gastroesophageal junction, 32 cm from the incisors. The mass was        partially obstructing and partially circumferential (involving        one-half of the lumen circumference). Biopsies were taken with a        cold forceps for histology.     CTS, AES, oncology, Rad onc consulted; appreciate assistance.   AES performed EUS , mass determined to be T3N1  MRI Brain unremarkable, Bone scan  showed area of humerus which was nonspecific but warranted f/u (MRI as OP ordered), Radiation-Oncology consulted and XRT was begun as IP (to be continued as OP x28 sessions).   Open jejunostomy performed on 05/01/2019, revisited on 05/02 after dislodgement of the tube, started on tube feeds, which he tolerated well.      Acute blood loss anemia  -Due to GI bleeding  -Received 1 unit PRBC 4/27  -Stabilized without repeat bleeding.     Hypomagnesemia  -Repleted prn     Chronic kidney disease, stage III (moderate)  -back to baseline      Hypertension  -Held home oral meds in the setting of hypotension and GIB. Resumed upon discharge.

## 2019-04-28 NOTE — PLAN OF CARE
Problem: Adult Inpatient Plan of Care  Goal: Plan of Care Review  Outcome: Ongoing (interventions implemented as appropriate)   Plan of care discussed with pt. Pt verbalizes understanding. Pt denies the need for pain medication. Pt on telemetry, NSR. Pt ambulated in room. Pt positions independently. VS stable. Pt remains free of falls and injury. Pt had 1 BM this shift. No acute events this shift. Will continue to monitor.

## 2019-04-28 NOTE — ASSESSMENT & PLAN NOTE
Admitted to ICU @[facility]. Underwent EGD which found large circumferential mass of GEJ; biopsies taken.  -He continues on PPI  -Continue H/H q12h.  Transfuse for Hb < 7.0  -Hold aspirin and plavix.  -Continue NPO  -Await CTS consult.

## 2019-04-28 NOTE — CONSULTS
[facility]  Thoracic Surgery  Consult Note    Patient Name: Brooks Canas  MRN: [id number]  Code Status: Full Code  Admission Date: 4/26/2019  Hospital Length of Stay: 2 days  Attending Physician: Marybel Fernandez*  Primary Care Provider: Aris Sahu MD    Patient information was obtained from patient, past medical records and ER records.     Inpatient consult to Cardiothoracic Surgery  Consult performed by: Charlene Mai MD  Consult ordered by: Joel Rand MD        Subjective:     Principal Problem: Acute upper GI bleed    History of Present Illness: 67 yo M with a history of PAD s/p Right femoral popliteal bypass with reverse saphenous vein graft (2016), BPH, CKD stage III, HTN, hyperuricemia, and tobacco abuse who presents as a transfer for hematemesis. He states that he ate a piece of chicken and was not able to keep it down and then had an episodes of bloody emesis. This was the first time this has happened. He also noted that this past week he has had dysphagia only with solid foods. He states that he will have to wait a few minutes after he eat something solid for it to go down. He denies dysphagia with liquids, weight loss, anorexia, chest pain, dyspnea with exertion or persistent nausea. He is able to get around well without any significant issues except for some minor RLE discomfort when he ambulates for a while. He is able to go up flights of stairs with minimal dyspnea. He is a former smoker; he smoked 2ppd x40 years. He quit 3 years ago. He is on plavix for PAD. He had an EGD performed on 4/27/19 which revealed a large, fungating mass with no bleeding and stigmata of recent bleeding was found in the lower third of the esophagus and at the gastroesophageal junction, 32 cm from the incisors. Since admission, he has not had any more hematemesis. Thoracic surgery consulted for further evaluation.    No current facility-administered medications on file  prior to encounter.      Current Outpatient Medications on File Prior to Encounter   Medication Sig    besifloxacin (BESIVANCE) 0.6 % DrpS 1 drop 3 (three) times daily.    [] difluprednate (DUREZOL) 0.05 % Drop ophthalmic solution 1 drop 4 (four) times daily.    nepafenac (ILEVRO) 0.3 % DrpS Apply 1 drop to eye once daily.    allopurinol (ZYLOPRIM) 100 MG tablet Take 100 mg by mouth once daily.    amLODIPine (NORVASC) 10 MG tablet TAKE 1 TABLET(10 MG) BY MOUTH EVERY DAY    atorvastatin (LIPITOR) 40 MG tablet TAKE 1 TABLET BY MOUTH ONCE DAILY    benazepril (LOTENSIN) 20 MG tablet TAKE 1 TABLET(20 MG) BY MOUTH EVERY DAY    carvedilol (COREG) 6.25 MG tablet TAKE 1 TABLET(6.25 MG) BY MOUTH TWICE DAILY    clopidogrel (PLAVIX) 75 mg tablet TAKE 1 TABLET BY MOUTH ONCE DAILY.    clopidogrel (PLAVIX) 75 mg tablet TAKE 1 TABLET BY MOUTH ONCE DAILY.    tamsulosin (FLOMAX) 0.4 mg Cp24 Take 0.4 mg by mouth once daily.        Review of patient's allergies indicates:  No Known Allergies    Past Medical History:   Diagnosis Date    BPH (benign prostatic hyperplasia)     Chronic kidney disease, stage III (moderate) 3/1/2016    12/10/2015: BUN/crea: 32/1.63. CrCl 43.    Claudication in peripheral vascular disease     History of tobacco use 2016    1970: Began smoking.   3/1/2016: Quit smoking.    Hypertension, benign 3/1/2016    2012: Diagnosed.    Hyperuricemia     Leg swelling 3/14/2016    3/4/2016: Began having right leg edema.    PAD (peripheral artery disease)     Peripheral artery disease 3/1/2016    2014: Began experience bilateral calf claudication.  2016: [facility]: Arterial Duplex: Right: SFA: distal severe. RAVI 0.78. Left: SFA: mid occlusion. RAVI 0.65. 2016: Began experience pain in right great toe.    Tobacco use 3/1/2016    1970: Began smoking. Unable to quit.    Vitamin D deficiency disease      Past Surgical History:   Procedure Laterality Date    ANGIOGRAM-EXTREMITY-LOWER Bilateral  3/2/2016    Performed by Phoenix Ayers MD at [facility] CATH LAB    AORTOGRAM WITH RUNOFF Bilateral 5/4/2017    Performed by Phoenix Ayers MD at [facility] CATH LAB    [id number] Right 3/4/2016    Performed by Aris Finney Jr., MD at [facility] OR    FEMORAL BYPASS Right 2016     Family History     None        Tobacco Use    Smoking status: Former Smoker     Packs/day: 0.75     Years: 46.00     Pack years: 34.50     Start date: 1/1/1970     Last attempt to quit: 3/1/2016     Years since quitting: 3.1    Smokeless tobacco: Never Used   Substance and Sexual Activity    Alcohol use: No     Alcohol/week: 0.0 oz    Drug use: Not on file    Sexual activity: Not on file     Review of Systems   Constitutional: Negative for activity change, appetite change, fatigue and unexpected weight change.   HENT: Positive for trouble swallowing. Negative for nosebleeds, sore throat and voice change.    Respiratory: Negative for apnea, chest tightness and shortness of breath.    Cardiovascular: Negative for chest pain and palpitations.   Gastrointestinal: Negative for abdominal distention and abdominal pain.   Neurological: Negative.    Hematological: Negative.    Psychiatric/Behavioral: Negative.      Objective:     Vital Signs (Most Recent):  Temp: 98.6 °F (37 °C) (04/28/19 0950)  Pulse: 67 (04/28/19 1137)  Resp: 16 (04/28/19 0950)  BP: 121/60 (04/28/19 0950)  SpO2: 96 % (04/28/19 0950) Vital Signs (24h Range):  Temp:  [98.3 °F (36.8 °C)-99.6 °F (37.6 °C)] 98.6 °F (37 °C)  Pulse:  [52-84] 67  Resp:  [14-22] 16  SpO2:  [96 %-99 %] 96 %  BP: (106-160)/(54-89) 121/60     Weight: 81 kg (178 lb 9.2 oz)  Body mass index is 25.62 kg/m².    Physical Exam   Constitutional: He is oriented to person, place, and time. He appears well-developed and well-nourished. No distress.   HENT:   Head: Normocephalic and atraumatic.   Eyes: Right eye exhibits no discharge. No scleral icterus.   Neck: Normal range of motion. Neck supple.    Cardiovascular: Normal rate and regular rhythm.   Pulmonary/Chest: Effort normal and breath sounds normal. No stridor. No respiratory distress.   Abdominal: Soft. Bowel sounds are normal. He exhibits no distension. There is no tenderness.   Musculoskeletal: Normal range of motion. He exhibits no edema or deformity.   Neurological: He is alert and oriented to person, place, and time.   Skin: Skin is warm and dry.   Psychiatric: He has a normal mood and affect. His behavior is normal.       Significant Labs:  CBC:   Recent Labs   Lab 04/28/19  0804   WBC 5.86   RBC 2.73*   HGB 7.6*   HCT 23.9*   *   MCV 88   MCH 27.8   MCHC 31.8*     CMP:   Recent Labs   Lab 04/28/19  0326   GLU 88   CALCIUM 8.0*   ALBUMIN 2.6*   PROT 4.5*      K 3.7   CO2 19*   *   BUN 45*   CREATININE 1.3   ALKPHOS 54*   ALT 5*   AST 11   BILITOT 0.3     Coagulation:   Recent Labs   Lab 04/26/19  2122   LABPROT 10.9   INR 1.0   APTT 25.7       Significant Diagnostics:  I have reviewed all pertinent imaging results/findings within the past 24 hours.    Assessment/Plan:     Esophageal mass  -Patient presents with esophageal mass that is concerning for malignancy. He needs be staged appropriately prior to any surgical intervention.  -Recommend consult to advanced endoscopy for EUS.  -Needs CT chest/abd/pelvis with IV contrast for staging as well  -Surgery will follow  -Discussed with patient and Dr. Vásquez (Thoracic surgery staff)      Thank you for your consult. I will follow-up with patient. Please contact us if you have any additional questions.    Charlene Mai MD  General Surgery  [facility]

## 2019-04-28 NOTE — SUBJECTIVE & OBJECTIVE
No current facility-administered medications on file prior to encounter.      Current Outpatient Medications on File Prior to Encounter   Medication Sig    besifloxacin (BESIVANCE) 0.6 % DrpS 1 drop 3 (three) times daily.    [] difluprednate (DUREZOL) 0.05 % Drop ophthalmic solution 1 drop 4 (four) times daily.    nepafenac (ILEVRO) 0.3 % DrpS Apply 1 drop to eye once daily.    allopurinol (ZYLOPRIM) 100 MG tablet Take 100 mg by mouth once daily.    amLODIPine (NORVASC) 10 MG tablet TAKE 1 TABLET(10 MG) BY MOUTH EVERY DAY    atorvastatin (LIPITOR) 40 MG tablet TAKE 1 TABLET BY MOUTH ONCE DAILY    benazepril (LOTENSIN) 20 MG tablet TAKE 1 TABLET(20 MG) BY MOUTH EVERY DAY    carvedilol (COREG) 6.25 MG tablet TAKE 1 TABLET(6.25 MG) BY MOUTH TWICE DAILY    clopidogrel (PLAVIX) 75 mg tablet TAKE 1 TABLET BY MOUTH ONCE DAILY.    clopidogrel (PLAVIX) 75 mg tablet TAKE 1 TABLET BY MOUTH ONCE DAILY.    tamsulosin (FLOMAX) 0.4 mg Cp24 Take 0.4 mg by mouth once daily.        Review of patient's allergies indicates:  No Known Allergies    Past Medical History:   Diagnosis Date    BPH (benign prostatic hyperplasia)     Chronic kidney disease, stage III (moderate) 3/1/2016    12/10/2015: BUN/crea: 32/1.63. CrCl 43.    Claudication in peripheral vascular disease     History of tobacco use 2016    1970: Began smoking.   3/1/2016: Quit smoking.    Hypertension, benign 3/1/2016    2012: Diagnosed.    Hyperuricemia     Leg swelling 3/14/2016    3/4/2016: Began having right leg edema.    PAD (peripheral artery disease)     Peripheral artery disease 3/1/2016    2014: Began experience bilateral calf claudication.  2016: [facility]: Arterial Duplex: Right: SFA: distal severe. RAVI 0.78. Left: SFA: mid occlusion. RAVI 0.65. 2016: Began experience pain in right great toe.    Tobacco use 3/1/2016    1970: Began smoking. Unable to quit.    Vitamin D deficiency disease      Past Surgical History:   Procedure  Laterality Date    ANGIOGRAM-EXTREMITY-LOWER Bilateral 3/2/2016    Performed by Phoenix Ayers [facility] at [facility] CATH LAB    AORTOGRAM WITH RUNOFF Bilateral 5/4/2017    Performed by Phoenix Ayers MD at [facility] CATH LAB    [id number] Right 3/4/2016    Performed by Aris Finney Jr., [facility] at [facility] OR    FEMORAL BYPASS Right 2016     Family History     None        Tobacco Use    Smoking status: Former Smoker     Packs/day: 0.75     Years: 46.00     Pack years: 34.50     Start date: 1/1/1970     Last attempt to quit: 3/1/2016     Years since quitting: 3.1    Smokeless tobacco: Never Used   Substance and Sexual Activity    Alcohol use: No     Alcohol/week: 0.0 oz    Drug use: Not on file    Sexual activity: Not on file     Review of Systems   Constitutional: Negative for activity change, appetite change, fatigue and unexpected weight change.   HENT: Positive for trouble swallowing. Negative for nosebleeds, sore throat and voice change.    Respiratory: Negative for apnea, chest tightness and shortness of breath.    Cardiovascular: Negative for chest pain and palpitations.   Gastrointestinal: Negative for abdominal distention and abdominal pain.   Neurological: Negative.    Hematological: Negative.    Psychiatric/Behavioral: Negative.      Objective:     Vital Signs (Most Recent):  Temp: 98.6 °F (37 °C) (04/28/19 0950)  Pulse: 67 (04/28/19 1137)  Resp: 16 (04/28/19 0950)  BP: 121/60 (04/28/19 0950)  SpO2: 96 % (04/28/19 0950) Vital Signs (24h Range):  Temp:  [98.3 °F (36.8 °C)-99.6 °F (37.6 °C)] 98.6 °F (37 °C)  Pulse:  [52-84] 67  Resp:  [14-22] 16  SpO2:  [96 %-99 %] 96 %  BP: (106-160)/(54-89) 121/60     Weight: 81 kg (178 lb 9.2 oz)  Body mass index is 25.62 kg/m².    Physical Exam   Constitutional: He is oriented to person, place, and time. He appears well-developed and well-nourished. No distress.   HENT:   Head: Normocephalic and atraumatic.   Eyes: Right eye exhibits no discharge. No scleral icterus.    Neck: Normal range of motion. Neck supple.   Cardiovascular: Normal rate and regular rhythm.   Pulmonary/Chest: Effort normal and breath sounds normal. No stridor. No respiratory distress.   Abdominal: Soft. Bowel sounds are normal. He exhibits no distension. There is no tenderness.   Musculoskeletal: Normal range of motion. He exhibits no edema or deformity.   Neurological: He is alert and oriented to person, place, and time.   Skin: Skin is warm and dry.   Psychiatric: He has a normal mood and affect. His behavior is normal.       Significant Labs:  CBC:   Recent Labs   Lab 04/28/19  0804   WBC 5.86   RBC 2.73*   HGB 7.6*   HCT 23.9*   *   MCV 88   MCH 27.8   MCHC 31.8*     CMP:   Recent Labs   Lab 04/28/19  0326   GLU 88   CALCIUM 8.0*   ALBUMIN 2.6*   PROT 4.5*      K 3.7   CO2 19*   *   BUN 45*   CREATININE 1.3   ALKPHOS 54*   ALT 5*   AST 11   BILITOT 0.3     Coagulation:   Recent Labs   Lab 04/26/19  2122   LABPROT 10.9   INR 1.0   APTT 25.7       Significant Diagnostics:  I have reviewed all pertinent imaging results/findings within the past 24 hours.

## 2019-04-28 NOTE — ASSESSMENT & PLAN NOTE
-Due to GI bleeding  -Received 1 unit PRBC 4/27  -Check iron, ferritin, b12 and folate  -Repeat h/h q12h, next check @1600

## 2019-04-28 NOTE — ASSESSMENT & PLAN NOTE
-Patient presents with esophageal mass that is concerning for malignancy. He needs be staged appropriately prior to any surgical intervention.  -Recommend consult to advanced endoscopy for EUS.  -Needs CT chest/abd/pelvis for staging as well  -Surgery will follow  -Discussed with patient and Dr. Vásquez (Thoracic surgery staff)

## 2019-04-28 NOTE — PROGRESS NOTES
"[facility] Hospital Medicine  Progress Note    Patient Name: Brooks Canas  MRN: [id number]  Patient Class: IP- Inpatient   Admission Date: 4/26/2019  Length of Stay: 2 days  Attending Physician: Marybel Fernandez*  Primary Care Provider: Aris Sahu MD    [facility] Medicine Team: [facility] HOSP MED R Marybel Fernandez MD    Subjective:     Principal Problem:Acute upper GI bleed    HPI:  The patient is a 68 y.o. male with hx of PAD, on Plavix who presents with complaint of hematemesis, onset 20 minutes PTA. Pt reports multiple episodes of emesis with bright red blood. He has associated abdominal "fullness," lightheadedness, and generalized weakness. He states that he felt lightheaded when getting off stretcher to walk to ED bed. Pt also states that in the past few days he has felt like his food has been getting stuck when swallow, sensation typically resolved without intervention. Pt denies any fever, chills, diarrhea, abdominal pain, chest pain, SOB, back pain, dizziness, extremity weakness, urinary sx, and rash. Pt reports no hx of drinking. He did not take his daily ASA today, but did take Plavix.        Hospital Course:  He was treated initially with pantoprazole and sandostatin infusions, narrowed to pantoprazole at GI's recommendation. He underwent EGD on 4/27 which showed a partially circumferential fungating esophageal mass near the GE junction, described as somewhat necrotic appearing. Biopsies were taken, with oozing noted from the friable mass which resolved by the end of the procedure. He was transferred to [facility] for additional GI and surgical evaluation.     CTS was consulted 4/28.    Interval History: No recurrent hematemesis. No BM yet. Denies bloody, black stools so far. No nausea or abdo pain. Comfortable and in NAD. VSS. Hg trending down. CTS consult today. Keep NPO.     Review of Systems   Constitutional: Negative for fever.   Respiratory: Negative for shortness of " breath.    Cardiovascular: Negative for chest pain, palpitations and leg swelling.   Gastrointestinal: Negative for abdominal pain, blood in stool, constipation, diarrhea, nausea and vomiting.   Neurological: Negative for dizziness and syncope.   Psychiatric/Behavioral: Negative for agitation, behavioral problems and confusion.     Objective:     Vital Signs (Most Recent):  Temp: 98.7 °F (37.1 °C) (04/28/19 0416)  Pulse: 71 (04/28/19 0700)  Resp: 16 (04/28/19 0416)  BP: (!) 114/59 (04/28/19 0416)  SpO2: 98 % (04/28/19 0416) Vital Signs (24h Range):  Temp:  [98.3 °F (36.8 °C)-99.6 °F (37.6 °C)] 98.7 °F (37.1 °C)  Pulse:  [52-84] 71  Resp:  [12-25] 16  SpO2:  [96 %-99 %] 98 %  BP: (106-163)/(54-89) 114/59     Weight: 81 kg (178 lb 9.2 oz)  Body mass index is 25.62 kg/m².    Intake/Output Summary (Last 24 hours) at 4/28/2019 0943  Last data filed at 4/28/2019 0400  Gross per 24 hour   Intake 300 ml   Output 1080 ml   Net -780 ml      Physical Exam   Constitutional: He is oriented to person, place, and time. He appears well-developed and well-nourished. No distress.   HENT:   Head: Normocephalic and atraumatic.   Eyes: Conjunctivae and EOM are normal. No scleral icterus.   Neck: Normal range of motion.   Pulmonary/Chest: Effort normal and breath sounds normal. No respiratory distress.   Abdominal: Soft. Bowel sounds are normal. He exhibits no distension. There is no tenderness. There is no rebound and no guarding.   Musculoskeletal: Normal range of motion.   Neurological: He is alert and oriented to person, place, and time.   Skin: Skin is warm and dry. No rash noted. He is not diaphoretic. No erythema. No pallor.   Psychiatric: He has a normal mood and affect. His behavior is normal. Judgment and thought content normal.       Significant Labs:   CBC:   Recent Labs   Lab 04/27/19  1443 04/28/19  0023 04/28/19  0804   WBC 9.17 6.27 5.86   HGB 8.9* 7.6* 7.6*   HCT 26.5* 23.2* 23.9*    125* 125*     CMP:   Recent  Labs   Lab 04/26/19 2122 04/27/19  0621 04/28/19  0326    140 140   K 4.5 4.9 3.7    115* 115*   CO2 22* 18* 19*   * 118* 88   BUN 25* 47* 45*   CREATININE 1.5* 1.3 1.3   CALCIUM 8.8 8.3* 8.0*   PROT 5.7* 4.8* 4.5*   ALBUMIN 3.0* 2.6* 2.6*   BILITOT 0.5 0.9 0.3   ALKPHOS 82 62 54*   AST 11 9* 11   ALT 9* 9* 5*   ANIONGAP 9 7* 6*   EGFRNONAA 46* 55* 55.3*       Significant Imaging: I have reviewed and interpreted all pertinent imaging results/findings within the past 24 hours.    Assessment/Plan:      * Acute upper GI bleed  Admitted to ICU @[facility]. Underwent EGD which found large circumferential mass of GEJ; biopsies taken.  -He continues on PPI  -Continue H/H q12h.  Transfuse for Hb < 7.0  -Hold aspirin and plavix.  -Continue NPO  -Await CTS consult.    Esophageal mass  Per endoscopy (4/27) report:   A large, fungating mass with no bleeding and stigmata of recent        bleeding was found in the lower third of the esophagus and at the        gastroesophageal junction, 32 cm from the incisors. The mass was        partially obstructing and partially circumferential (involving        one-half of the lumen circumference). Biopsies were taken with a        cold forceps for histology.    CTS consulted; appreciate assistance.   Oncology consult once path results; confirmed path specimens have been received by [facility] path lab.   NPO pending surgical evaluation    Acute blood loss anemia  -Due to GI bleeding  -Received 1 unit PRBC 4/27  -Check iron, ferritin, b12 and folate  -Repeat h/h q12h, next check @1600    Hypomagnesemia  -Replete prn    Chronic kidney disease, stage III (moderate)  -Baseline Cr 1.4-1.6, currently 1.3/below baseline.  -Will trend, renally dose renally cleared medications.  -IVF while NPO, ice chips ok.     Hypertension  -Continue to hold home oral meds in the setting of hypotension and GIB. Resume when clinically appropriate.   -Order hydralazine PRN SBP > 170      VTE Risk  Mitigation (From admission, onward)        Ordered     Place sequential compression device  Until discontinued      04/27/19 0120     IP VTE HIGH RISK PATIENT  Once      04/27/19 0120     Reason for No Pharmacological VTE Prophylaxis  Once      04/27/19 0120              Marybel Fernandez MD  Department of Hospital Medicine   [facility]

## 2019-04-28 NOTE — ASSESSMENT & PLAN NOTE
-Continue to hold home oral meds in the setting of hypotension and GIB. Resume when clinically appropriate.   -Order hydralazine PRN SBP > 170

## 2019-04-28 NOTE — SUBJECTIVE & OBJECTIVE
Interval History: No recurrent hematemesis. No BM yet. Denies bloody, black stools so far. No nausea or abdo pain. Comfortable and in NAD. VSS. Hg trending down. CTS consult today. Keep NPO.     Review of Systems   Constitutional: Negative for fever.   Respiratory: Negative for shortness of breath.    Cardiovascular: Negative for chest pain, palpitations and leg swelling.   Gastrointestinal: Negative for abdominal pain, blood in stool, constipation, diarrhea, nausea and vomiting.   Neurological: Negative for dizziness and syncope.   Psychiatric/Behavioral: Negative for agitation, behavioral problems and confusion.     Objective:     Vital Signs (Most Recent):  Temp: 98.7 °F (37.1 °C) (04/28/19 0416)  Pulse: 71 (04/28/19 0700)  Resp: 16 (04/28/19 0416)  BP: (!) 114/59 (04/28/19 0416)  SpO2: 98 % (04/28/19 0416) Vital Signs (24h Range):  Temp:  [98.3 °F (36.8 °C)-99.6 °F (37.6 °C)] 98.7 °F (37.1 °C)  Pulse:  [52-84] 71  Resp:  [12-25] 16  SpO2:  [96 %-99 %] 98 %  BP: (106-163)/(54-89) 114/59     Weight: 81 kg (178 lb 9.2 oz)  Body mass index is 25.62 kg/m².    Intake/Output Summary (Last 24 hours) at 4/28/2019 0943  Last data filed at 4/28/2019 0400  Gross per 24 hour   Intake 300 ml   Output 1080 ml   Net -780 ml      Physical Exam   Constitutional: He is oriented to person, place, and time. He appears well-developed and well-nourished. No distress.   HENT:   Head: Normocephalic and atraumatic.   Eyes: Conjunctivae and EOM are normal. No scleral icterus.   Neck: Normal range of motion.   Pulmonary/Chest: Effort normal and breath sounds normal. No respiratory distress.   Abdominal: Soft. Bowel sounds are normal. He exhibits no distension. There is no tenderness. There is no rebound and no guarding.   Musculoskeletal: Normal range of motion.   Neurological: He is alert and oriented to person, place, and time.   Skin: Skin is warm and dry. No rash noted. He is not diaphoretic. No erythema. No pallor.   Psychiatric: He  has a normal mood and affect. His behavior is normal. Judgment and thought content normal.       Significant Labs:   CBC:   Recent Labs   Lab 04/27/19  1443 04/28/19  0023 04/28/19  0804   WBC 9.17 6.27 5.86   HGB 8.9* 7.6* 7.6*   HCT 26.5* 23.2* 23.9*    125* 125*     CMP:   Recent Labs   Lab 04/26/19  2122 04/27/19  0621 04/28/19  0326    140 140   K 4.5 4.9 3.7    115* 115*   CO2 22* 18* 19*   * 118* 88   BUN 25* 47* 45*   CREATININE 1.5* 1.3 1.3   CALCIUM 8.8 8.3* 8.0*   PROT 5.7* 4.8* 4.5*   ALBUMIN 3.0* 2.6* 2.6*   BILITOT 0.5 0.9 0.3   ALKPHOS 82 62 54*   AST 11 9* 11   ALT 9* 9* 5*   ANIONGAP 9 7* 6*   EGFRNONAA 46* 55* 55.3*       Significant Imaging: I have reviewed and interpreted all pertinent imaging results/findings within the past 24 hours.

## 2019-04-29 LAB
ALBUMIN SERPL BCP-MCNC: 2.8 G/DL (ref 3.5–5.2)
ALP SERPL-CCNC: 67 U/L (ref 55–135)
ALT SERPL W/O P-5'-P-CCNC: 8 U/L (ref 10–44)
ANION GAP SERPL CALC-SCNC: 11 MMOL/L (ref 8–16)
AST SERPL-CCNC: 14 U/L (ref 10–40)
BASOPHILS # BLD AUTO: 0.02 K/UL (ref 0–0.2)
BASOPHILS # BLD AUTO: 0.02 K/UL (ref 0–0.2)
BASOPHILS NFR BLD: 0.4 % (ref 0–1.9)
BASOPHILS NFR BLD: 0.4 % (ref 0–1.9)
BILIRUB SERPL-MCNC: 0.5 MG/DL (ref 0.1–1)
BUN SERPL-MCNC: 26 MG/DL (ref 8–23)
CALCIUM SERPL-MCNC: 8.5 MG/DL (ref 8.7–10.5)
CHLORIDE SERPL-SCNC: 110 MMOL/L (ref 95–110)
CO2 SERPL-SCNC: 18 MMOL/L (ref 23–29)
CREAT SERPL-MCNC: 1.3 MG/DL (ref 0.5–1.4)
DIFFERENTIAL METHOD: ABNORMAL
DIFFERENTIAL METHOD: ABNORMAL
EOSINOPHIL # BLD AUTO: 0.1 K/UL (ref 0–0.5)
EOSINOPHIL # BLD AUTO: 0.1 K/UL (ref 0–0.5)
EOSINOPHIL NFR BLD: 1.1 % (ref 0–8)
EOSINOPHIL NFR BLD: 1.5 % (ref 0–8)
ERYTHROCYTE [DISTWIDTH] IN BLOOD BY AUTOMATED COUNT: 15.1 % (ref 11.5–14.5)
ERYTHROCYTE [DISTWIDTH] IN BLOOD BY AUTOMATED COUNT: 15.2 % (ref 11.5–14.5)
EST. GFR  (AFRICAN AMERICAN): >60 ML/MIN/1.73 M^2
EST. GFR  (NON AFRICAN AMERICAN): 55.3 ML/MIN/1.73 M^2
GLUCOSE SERPL-MCNC: 96 MG/DL (ref 70–110)
HCT VFR BLD AUTO: 23.7 % (ref 40–54)
HCT VFR BLD AUTO: 26 % (ref 40–54)
HGB BLD-MCNC: 7.5 G/DL (ref 14–18)
HGB BLD-MCNC: 8.4 G/DL (ref 14–18)
IMM GRANULOCYTES # BLD AUTO: 0.01 K/UL (ref 0–0.04)
IMM GRANULOCYTES # BLD AUTO: 0.02 K/UL (ref 0–0.04)
IMM GRANULOCYTES NFR BLD AUTO: 0.2 % (ref 0–0.5)
IMM GRANULOCYTES NFR BLD AUTO: 0.4 % (ref 0–0.5)
LYMPHOCYTES # BLD AUTO: 0.7 K/UL (ref 1–4.8)
LYMPHOCYTES # BLD AUTO: 0.8 K/UL (ref 1–4.8)
LYMPHOCYTES NFR BLD: 15 % (ref 18–48)
LYMPHOCYTES NFR BLD: 16.9 % (ref 18–48)
MAGNESIUM SERPL-MCNC: 1.4 MG/DL (ref 1.6–2.6)
MCH RBC QN AUTO: 27.7 PG (ref 27–31)
MCH RBC QN AUTO: 28 PG (ref 27–31)
MCHC RBC AUTO-ENTMCNC: 31.6 G/DL (ref 32–36)
MCHC RBC AUTO-ENTMCNC: 32.3 G/DL (ref 32–36)
MCV RBC AUTO: 86 FL (ref 82–98)
MCV RBC AUTO: 88 FL (ref 82–98)
MONOCYTES # BLD AUTO: 0.4 K/UL (ref 0.3–1)
MONOCYTES # BLD AUTO: 0.4 K/UL (ref 0.3–1)
MONOCYTES NFR BLD: 9.3 % (ref 4–15)
MONOCYTES NFR BLD: 9.7 % (ref 4–15)
NEUTROPHILS # BLD AUTO: 3.3 K/UL (ref 1.8–7.7)
NEUTROPHILS # BLD AUTO: 3.3 K/UL (ref 1.8–7.7)
NEUTROPHILS NFR BLD: 71.9 % (ref 38–73)
NEUTROPHILS NFR BLD: 73.2 % (ref 38–73)
NRBC BLD-RTO: 0 /100 WBC
NRBC BLD-RTO: 0 /100 WBC
PLATELET # BLD AUTO: 121 K/UL (ref 150–350)
PLATELET # BLD AUTO: 133 K/UL (ref 150–350)
PMV BLD AUTO: 10.9 FL (ref 9.2–12.9)
PMV BLD AUTO: 11 FL (ref 9.2–12.9)
POTASSIUM SERPL-SCNC: 3.9 MMOL/L (ref 3.5–5.1)
PREALB SERPL-MCNC: 19 MG/DL (ref 20–43)
PROT SERPL-MCNC: 4.9 G/DL (ref 6–8.4)
RBC # BLD AUTO: 2.68 M/UL (ref 4.6–6.2)
RBC # BLD AUTO: 3.03 M/UL (ref 4.6–6.2)
SODIUM SERPL-SCNC: 139 MMOL/L (ref 136–145)
WBC # BLD AUTO: 4.52 K/UL (ref 3.9–12.7)
WBC # BLD AUTO: 4.61 K/UL (ref 3.9–12.7)

## 2019-04-29 PROCEDURE — 25000003 PHARM REV CODE 250: Performed by: NURSE PRACTITIONER

## 2019-04-29 PROCEDURE — 83735 ASSAY OF MAGNESIUM: CPT

## 2019-04-29 PROCEDURE — 84134 ASSAY OF PREALBUMIN: CPT

## 2019-04-29 PROCEDURE — 80053 COMPREHEN METABOLIC PANEL: CPT

## 2019-04-29 PROCEDURE — 20600001 HC STEP DOWN PRIVATE ROOM

## 2019-04-29 PROCEDURE — 99232 PR SUBSEQUENT HOSPITAL CARE,LEVL II: ICD-10-PCS | Mod: ,,, | Performed by: HOSPITALIST

## 2019-04-29 PROCEDURE — 25000003 PHARM REV CODE 250: Performed by: INTERNAL MEDICINE

## 2019-04-29 PROCEDURE — 88305 TISSUE EXAM BY PATHOLOGIST: CPT | Mod: 26,,, | Performed by: PATHOLOGY

## 2019-04-29 PROCEDURE — 99232 SBSQ HOSP IP/OBS MODERATE 35: CPT | Mod: ,,, | Performed by: HOSPITALIST

## 2019-04-29 PROCEDURE — 85025 COMPLETE CBC W/AUTO DIFF WBC: CPT

## 2019-04-29 PROCEDURE — 36415 COLL VENOUS BLD VENIPUNCTURE: CPT

## 2019-04-29 PROCEDURE — 88305 TISSUE SPECIMEN TO PATHOLOGY: ICD-10-PCS | Mod: 26,,, | Performed by: PATHOLOGY

## 2019-04-29 PROCEDURE — 88305 TISSUE EXAM BY PATHOLOGIST: CPT | Performed by: PATHOLOGY

## 2019-04-29 RX ADMIN — ACETAMINOPHEN 650 MG: 325 TABLET ORAL at 10:04

## 2019-04-29 RX ADMIN — PANTOPRAZOLE SODIUM 8 MG/HR: 40 INJECTION, POWDER, FOR SOLUTION INTRAVENOUS at 06:04

## 2019-04-29 RX ADMIN — PANTOPRAZOLE SODIUM 8 MG/HR: 40 INJECTION, POWDER, FOR SOLUTION INTRAVENOUS at 10:04

## 2019-04-29 RX ADMIN — ACETAMINOPHEN 650 MG: 325 TABLET ORAL at 09:04

## 2019-04-29 RX ADMIN — SODIUM CHLORIDE, SODIUM LACTATE, POTASSIUM CHLORIDE, AND CALCIUM CHLORIDE 100 ML/HR: 600; 310; 30; 20 INJECTION, SOLUTION INTRAVENOUS at 04:04

## 2019-04-29 RX ADMIN — SODIUM CHLORIDE, SODIUM LACTATE, POTASSIUM CHLORIDE, AND CALCIUM CHLORIDE 100 ML/HR: 600; 310; 30; 20 INJECTION, SOLUTION INTRAVENOUS at 11:04

## 2019-04-29 RX ADMIN — PANTOPRAZOLE SODIUM 8 MG/HR: 40 INJECTION, POWDER, FOR SOLUTION INTRAVENOUS at 11:04

## 2019-04-29 RX ADMIN — SODIUM CHLORIDE, SODIUM LACTATE, POTASSIUM CHLORIDE, AND CALCIUM CHLORIDE 100 ML/HR: 600; 310; 30; 20 INJECTION, SOLUTION INTRAVENOUS at 02:04

## 2019-04-29 RX ADMIN — PANTOPRAZOLE SODIUM 8 MG/HR: 40 INJECTION, POWDER, FOR SOLUTION INTRAVENOUS at 04:04

## 2019-04-29 NOTE — ASSESSMENT & PLAN NOTE
Per endoscopy (4/27) report:   A large, fungating mass with no bleeding and stigmata of recent        bleeding was found in the lower third of the esophagus and at the        gastroesophageal junction, 32 cm from the incisors. The mass was        partially obstructing and partially circumferential (involving        one-half of the lumen circumference). Biopsies were taken with a        cold forceps for histology.    CTS consulted; appreciate assistance.   Oncology consult placed as patient will require neoadjuvant therapy prior to resection, per CTS; biopsy pathology pending.  AES consulted for EUS. Have cleared patient for CLD.

## 2019-04-29 NOTE — SUBJECTIVE & OBJECTIVE
Interval History: No recurrent hematemesis. Black, tarry BM overnight, as expected. No nausea or abdo pain. Comfortable and in NAD. VSS. Per GI, ok to trial diet (CLD-->Soft).    Review of Systems   Constitutional: Negative for fever.   Respiratory: Negative for shortness of breath.    Cardiovascular: Negative for chest pain, palpitations and leg swelling.   Gastrointestinal: Negative for abdominal pain, blood in stool, constipation, diarrhea, nausea and vomiting.   Neurological: Negative for dizziness and syncope.   Psychiatric/Behavioral: Negative for agitation, behavioral problems and confusion.     Objective:     Vital Signs (Most Recent):  Temp: 98.3 °F (36.8 °C) (04/29/19 1152)  Pulse: 72 (04/29/19 1622)  Resp: 18 (04/29/19 1152)  BP: (!) 142/65 (04/29/19 1152)  SpO2: 98 % (04/29/19 1152) Vital Signs (24h Range):  Temp:  [98.3 °F (36.8 °C)-98.7 °F (37.1 °C)] 98.3 °F (36.8 °C)  Pulse:  [70-85] 72  Resp:  [17-18] 18  SpO2:  [95 %-98 %] 98 %  BP: (110-146)/(56-65) 142/65     Weight: 81 kg (178 lb 9.2 oz)  Body mass index is 25.62 kg/m².    Intake/Output Summary (Last 24 hours) at 4/29/2019 1635  Last data filed at 4/29/2019 1600  Gross per 24 hour   Intake 2550 ml   Output 500 ml   Net 2050 ml      Physical Exam   Constitutional: He is oriented to person, place, and time. He appears well-developed and well-nourished. No distress.   HENT:   Head: Normocephalic and atraumatic.   Eyes: Conjunctivae and EOM are normal. No scleral icterus.   Neck: Normal range of motion.   Pulmonary/Chest: Effort normal and breath sounds normal. No respiratory distress.   Abdominal: Soft. Bowel sounds are normal. He exhibits no distension. There is no tenderness. There is no rebound and no guarding.   Musculoskeletal: Normal range of motion.   Neurological: He is alert and oriented to person, place, and time.   Skin: Skin is warm and dry. No rash noted. He is not diaphoretic. No erythema. No pallor.   Psychiatric: He has a normal mood  and affect. His behavior is normal. Judgment and thought content normal.       Significant Labs:   CBC:   Recent Labs   Lab 04/28/19  0804 04/28/19  1554 04/29/19  0352   WBC 5.86 6.63 4.52   HGB 7.6* 7.8* 7.5*   HCT 23.9* 24.0* 23.7*   * 125* 121*     CMP:   Recent Labs   Lab 04/28/19  0326 04/29/19  0352    139   K 3.7 3.9   * 110   CO2 19* 18*   GLU 88 96   BUN 45* 26*   CREATININE 1.3 1.3   CALCIUM 8.0* 8.5*   PROT 4.5* 4.9*   ALBUMIN 2.6* 2.8*   BILITOT 0.3 0.5   ALKPHOS 54* 67   AST 11 14   ALT 5* 8*   ANIONGAP 6* 11   EGFRNONAA 55.3* 55.3*       Significant Imaging: I have reviewed and interpreted all pertinent imaging results/findings within the past 24 hours.

## 2019-04-29 NOTE — PLAN OF CARE
04/29/19 1325   Discharge Assessment   Assessment Type Discharge Planning Assessment   Confirmed/corrected address and phone number on facesheet? Yes   Assessment information obtained from? Patient   Expected Length of Stay (days) 3   Communicated expected length of stay with patient/caregiver yes   Prior to hospitilization cognitive status: Alert/Oriented   Prior to hospitalization functional status: Independent   Current cognitive status: Alert/Oriented   Current Functional Status: Independent   Lives With significant other   Able to Return to Prior Arrangements yes   Is patient able to care for self after discharge? Yes   Patient's perception of discharge disposition home or selfcare   Readmission Within the Last 30 Days no previous admission in last 30 days   Patient currently being followed by outpatient case management? No   Patient currently receives any other outside agency services? No   Equipment Currently Used at Home none   Do you have any problems affording any of your prescribed medications? No   Is the patient taking medications as prescribed? yes   Does the patient have transportation home? Yes   Transportation Anticipated family or friend will provide   Does the patient receive services at the Coumadin Clinic? No   Discharge Plan A Home with family   Discharge Plan B Home Health   DME Needed Upon Discharge  none   Patient/Family in Agreement with Plan yes   Does the patient have transportation to healthcare appointments? Yes     SW met with patient at bedside. Pt confirmed address and pharmacy. The pt denied HH, HD, and coumadin. The patient also denied the use of medical equipment at home.

## 2019-04-29 NOTE — PROGRESS NOTES
"[facility] Hospital Medicine  Progress Note    Patient Name: Brooks Canas  MRN: [id number]  Patient Class: IP- Inpatient   Admission Date: 4/26/2019  Length of Stay: 3 days  Attending Physician: Marybel Fernandez*  Primary Care Provider: Aris Sahu MD    [facility] Medicine Team: [facility] R Marybel Fernandez MD    Subjective:     Principal Problem:Acute upper GI bleed    HPI:  The patient is a 68 y.o. male with hx of PAD, on Plavix who presents with complaint of hematemesis, onset 20 minutes PTA. Pt reports multiple episodes of emesis with bright red blood. He has associated abdominal "fullness," lightheadedness, and generalized weakness. He states that he felt lightheaded when getting off stretcher to walk to ED bed. Pt also states that in the past few days he has felt like his food has been getting stuck when swallow, sensation typically resolved without intervention. Pt denies any fever, chills, diarrhea, abdominal pain, chest pain, SOB, back pain, dizziness, extremity weakness, urinary sx, and rash. Pt reports no hx of drinking. He did not take his daily ASA today, but did take Plavix.      Hospital Course:  He was treated initially with pantoprazole and sandostatin infusions, narrowed to pantoprazole at GI's recommendation. He underwent EGD on 4/27 which showed a partially circumferential fungating esophageal mass near the GE junction, described as somewhat necrotic appearing. Biopsies were taken, with oozing noted from the friable mass which resolved by the end of the procedure. He was transferred to [facility] [place] GI and surgical evaluation.     CTS was consulted 4/28.    Interval History: No recurrent hematemesis. Black, tarry BM overnight, as expected. No nausea or abdo pain. Comfortable and in NAD. VSS. Per GI, ok to trial diet (CLD-->Soft).    Review of Systems   Constitutional: Negative for fever.   Respiratory: Negative for shortness of breath.  "   Cardiovascular: Negative for chest pain, palpitations and leg swelling.   Gastrointestinal: Negative for abdominal pain, blood in stool, constipation, diarrhea, nausea and vomiting.   Neurological: Negative for dizziness and syncope.   Psychiatric/Behavioral: Negative for agitation, behavioral problems and confusion.     Objective:     Vital Signs (Most Recent):  Temp: 98.3 °F (36.8 °C) (04/29/19 1152)  Pulse: 72 (04/29/19 1622)  Resp: 18 (04/29/19 1152)  BP: (!) 142/65 (04/29/19 1152)  SpO2: 98 % (04/29/19 1152) Vital Signs (24h Range):  Temp:  [98.3 °F (36.8 °C)-98.7 °F (37.1 °C)] 98.3 °F (36.8 °C)  Pulse:  [70-85] 72  Resp:  [17-18] 18  SpO2:  [95 %-98 %] 98 %  BP: (110-146)/(56-65) 142/65     Weight: 81 kg (178 lb 9.2 oz)  Body mass index is 25.62 kg/m².    Intake/Output Summary (Last 24 hours) at 4/29/2019 1635  Last data filed at 4/29/2019 1600  Gross per 24 hour   Intake 2550 ml   Output 500 ml   Net 2050 ml      Physical Exam   Constitutional: He is oriented to person, place, and time. He appears well-developed and well-nourished. No distress.   HENT:   Head: Normocephalic and atraumatic.   Eyes: Conjunctivae and EOM are normal. No scleral icterus.   Neck: Normal range of motion.   Pulmonary/Chest: Effort normal and breath sounds normal. No respiratory distress.   Abdominal: Soft. Bowel sounds are normal. He exhibits no distension. There is no tenderness. There is no rebound and no guarding.   Musculoskeletal: Normal range of motion.   Neurological: He is alert and oriented to person, place, and time.   Skin: Skin is warm and dry. No rash noted. He is not diaphoretic. No erythema. No pallor.   Psychiatric: He has a normal mood and affect. His behavior is normal. Judgment and thought content normal.       Significant Labs:   CBC:   Recent Labs   Lab 04/28/19  0804 04/28/19  1554 04/29/19  0352   WBC 5.86 6.63 4.52   HGB 7.6* 7.8* 7.5*   HCT 23.9* 24.0* 23.7*   * 125* 121*     CMP:   Recent Labs   Lab  04/28/19  0326 04/29/19  0352    139   K 3.7 3.9   * 110   CO2 19* 18*   GLU 88 96   BUN 45* 26*   CREATININE 1.3 1.3   CALCIUM 8.0* 8.5*   PROT 4.5* 4.9*   ALBUMIN 2.6* 2.8*   BILITOT 0.3 0.5   ALKPHOS 54* 67   AST 11 14   ALT 5* 8*   ANIONGAP 6* 11   EGFRNONAA 55.3* 55.3*       Significant Imaging: I have reviewed and interpreted all pertinent imaging results/findings within the past 24 hours.    Assessment/Plan:      * Acute upper GI bleed  Admitted to ICU @[facility]. Underwent EGD which found large circumferential mass of GEJ; biopsies taken.  -He continues on PPI  -Continue H/H q12h.  Transfuse for Hb < 7.0  -Hold aspirin and plavix.  -Trial CLD, with advancement to soft diet tomorrow if remains stable.     Esophageal mass  Per endoscopy (4/27) report:   A large, fungating mass with no bleeding and stigmata of recent        bleeding was found in the lower third of the esophagus and at the        gastroesophageal junction, 32 cm from the incisors. The mass was        partially obstructing and partially circumferential (involving        one-half of the lumen circumference). Biopsies were taken with a        cold forceps for histology.    CTS consulted; appreciate assistance.   Oncology consult placed as patient will require neoadjuvant therapy prior to resection, per CTS; biopsy pathology pending.  AES consulted for EUS. Have cleared patient for CLD.     Acute blood loss anemia  -Due to GI bleeding  -Received 1 unit PRBC 4/27  -Check iron, ferritin, b12 and folate  -Repeat h/h q12h, transfuse <7 g/dl.    Hypomagnesemia  -Replete prn    Chronic kidney disease, stage III (moderate)  -Baseline Cr 1.4-1.6, currently 1.3/below baseline.  -Will trend, renally dose renally cleared medications.     Hypertension  -Continue to hold home oral meds in the setting of hypotension and GIB. Resume when clinically appropriate.   -Order hydralazine PRN SBP > 170    VTE Risk Mitigation (From admission, onward)         Ordered     Place sequential compression device  Until discontinued      04/27/19 0120     IP VTE HIGH RISK PATIENT  Once      04/27/19 0120     Reason for No Pharmacological VTE Prophylaxis  Once      04/27/19 0120              Marybel Fernandez MD  Department of Hospital Medicine   [facility]

## 2019-04-29 NOTE — SUBJECTIVE & OBJECTIVE
No current facility-administered medications on file prior to encounter.      Current Outpatient Medications on File Prior to Encounter   Medication Sig    besifloxacin (BESIVANCE) 0.6 % DrpS 1 drop 3 (three) times daily.    nepafenac (ILEVRO) 0.3 % DrpS Apply 1 drop to eye once daily.    allopurinol (ZYLOPRIM) 100 MG tablet Take 100 mg by mouth once daily.    amLODIPine (NORVASC) 10 MG tablet TAKE 1 TABLET(10 MG) BY MOUTH EVERY DAY    atorvastatin (LIPITOR) 40 MG tablet TAKE 1 TABLET BY MOUTH ONCE DAILY    benazepril (LOTENSIN) 20 MG tablet TAKE 1 TABLET(20 MG) BY MOUTH EVERY DAY    carvedilol (COREG) 6.25 MG tablet TAKE 1 TABLET(6.25 MG) BY MOUTH TWICE DAILY    clopidogrel (PLAVIX) 75 mg tablet TAKE 1 TABLET BY MOUTH ONCE DAILY.    clopidogrel (PLAVIX) 75 mg tablet TAKE 1 TABLET BY MOUTH ONCE DAILY.    tamsulosin (FLOMAX) 0.4 mg Cp24 Take 0.4 mg by mouth once daily.        Review of patient's allergies indicates:  No Known Allergies    Past Medical History:   Diagnosis Date    BPH (benign prostatic hyperplasia)     Chronic kidney disease, stage III (moderate) 3/1/2016    12/10/2015: BUN/crea: 32/1.63. CrCl 43.    Claudication in peripheral vascular disease     History of tobacco use 11/4/2016    1970: Began smoking.   3/1/2016: Quit smoking.    Hypertension, benign 3/1/2016    2012: Diagnosed.    Hyperuricemia     Leg swelling 3/14/2016    3/4/2016: Began having right leg edema.    PAD (peripheral artery disease)     Peripheral artery disease 3/1/2016    2014: Began experience bilateral calf claudication.  1/2016: [facility]: Arterial Duplex: Right: SFA: distal severe. RAVI 0.78. Left: SFA: mid occlusion. RAVI 0.65. 2/1/2016: Began experience pain in right great toe.    Tobacco use 3/1/2016    1970: Began smoking. Unable to quit.    Vitamin D deficiency disease      Past Surgical History:   Procedure Laterality Date    ANGIOGRAM-EXTREMITY-LOWER Bilateral 3/2/2016    Performed by Phoenix Ayers MD  at [facility] CATH LAB    AORTOGRAM WITH RUNOFF Bilateral 5/4/2017    Performed by Phoenix Ayers MD at [facility] CATH LAB    LWNROK-JJEHUIP-WRFQRIOIH Right 3/4/2016    Performed by Aris Finney Jr., MD at [facility] OR    EGD (ESOPHAGOGASTRODUODENOSCOPY) Left 4/27/2019    Performed by Carine Le MD at [facility] ENDO    FEMORAL BYPASS Right 2016     Family History     None        Tobacco Use    Smoking status: Former Smoker     Packs/day: 0.75     Years: 46.00     Pack years: 34.50     Start date: 1/1/1970     Last attempt to quit: 3/1/2016     Years since quitting: 3.1    Smokeless tobacco: Never Used   Substance and Sexual Activity    Alcohol use: No     Alcohol/week: 0.0 oz    Drug use: Not on file    Sexual activity: Not on file     Review of Systems   Constitutional: Negative for activity change, appetite change, chills, fatigue, fever and unexpected weight change.   HENT: Positive for trouble swallowing. Negative for voice change.    Respiratory: Negative for cough and shortness of breath.    Cardiovascular: Negative.    Gastrointestinal: Negative.    Genitourinary: Negative.    Musculoskeletal: Negative.    Neurological: Negative.    Hematological: Negative.      Objective:     Vital Signs (Most Recent):  Temp: 98.3 °F (36.8 °C) (04/29/19 1152)  Pulse: 70 (04/29/19 1152)  Resp: 18 (04/29/19 1152)  BP: (!) 142/65 (04/29/19 1152)  SpO2: 98 % (04/29/19 1152) Vital Signs (24h Range):  Temp:  [98.3 °F (36.8 °C)-98.7 °F (37.1 °C)] 98.3 °F (36.8 °C)  Pulse:  [70-85] 70  Resp:  [17-18] 18  SpO2:  [95 %-98 %] 98 %  BP: (110-146)/(56-65) 142/65     Weight: 81 kg (178 lb 9.2 oz)  Body mass index is 25.62 kg/m².    Physical Exam   Constitutional: He is oriented to person, place, and time. He appears well-developed and well-nourished. No distress.   HENT:   Head: Normocephalic and atraumatic.   Eyes: Conjunctivae and EOM are normal. No scleral icterus.   Neck: Normal range of motion. Neck supple.   Cardiovascular: Normal  rate and regular rhythm.   Pulmonary/Chest: Effort normal. No respiratory distress.   Abdominal: Soft. He exhibits no distension. There is no tenderness.   Musculoskeletal: Normal range of motion. He exhibits no edema.   Neurological: He is alert and oriented to person, place, and time.   Nursing note and vitals reviewed.    Significant Labs:  CBC:   Recent Labs   Lab 04/29/19  0352   WBC 4.52   RBC 2.68*   HGB 7.5*   HCT 23.7*   *   MCV 88   MCH 28.0   MCHC 31.6*     CMP:   Recent Labs   Lab 04/29/19  0352   GLU 96   CALCIUM 8.5*   ALBUMIN 2.8*   PROT 4.9*      K 3.9   CO2 18*      BUN 26*   CREATININE 1.3   ALKPHOS 67   ALT 8*   AST 14   BILITOT 0.5     Significant Diagnostics:  I have reviewed and interpreted all pertinent imaging results/findings within the past 24 hours.

## 2019-04-29 NOTE — PROGRESS NOTES
[facility]  Thoracic Surgery  Progress Note    Subjective:     History of Present Illness:  69 yo M with a history of PAD s/p Right femoral popliteal bypass with reverse saphenous vein graft (2016), BPH, CKD stage III, HTN, hyperuricemia, and tobacco abuse who presents as a transfer for hematemesis. He states that he ate a piece of chicken and was not able to keep it down and then had an episodes of bloody emesis. This was the first time this has happened. He also noted that this past week he has had dysphagia only with solid foods. He states that he will have to wait a few minutes after he eat something solid for it to go down. He denies dysphagia with liquids, weight loss, anorexia, chest pain, dyspnea with exertion or persistent nausea. He is able to get around well without any significant issues except for some minor RLE discomfort when he ambulates for a while. He is able to go up flights of stairs with minimal dyspnea. He is a former smoker; he smoked 2ppd x40 years. He quit 3 years ago. He is on plavix for PAD. He had an EGD performed on 4/27/19 which revealed a large, fungating mass with no bleeding and stigmata of recent bleeding was found in the lower third of the esophagus and at the gastroesophageal junction, 32 cm from the incisors. Since admission, he has not had any more hematemesis. Thoracic surgery consulted for further evaluation.           Post-Op Info:  Procedure(s) (LRB):  EGD (ESOPHAGOGASTRODUODENOSCOPY) (Left)   2 Days Post-Op     Interval History: NPO. On LR and pantoprazole infusions. CAP with IV contrast yesterday. On RA.     Medications:  Continuous Infusions:   lactated ringers 100 mL/hr (04/29/19 4028)    pantoprazole 40 mg in dextrose 5 % 100 mL infusion (ready to mix system) 8 mg/hr (04/29/19 5560)     Scheduled Meds:   BESIFLOXACIN HCL 0.6% EYE DROPS (patient's own med)  1 drop Right Eye TID    DUREZOL (DIFLUPREDNATE 0.05%) EYE DROPS (patient's own med)  1 drop  Right Eye BID    ILEVO (NEPAFENAC 0.3%) EYE DROPS (patient's own med)  1 drop Right Eye Daily     PRN Meds:sodium chloride, acetaminophen, HYDROmorphone, ondansetron, ondansetron, oxyCODONE, sodium chloride 0.9%, sodium chloride 0.9%     Review of patient's allergies indicates:  No Known Allergies  Objective:     Vital Signs (Most Recent):  Temp: 98.7 °F (37.1 °C) (04/29/19 0752)  Pulse: 73 (04/29/19 0752)  Resp: 18 (04/29/19 0752)  BP: (!) 146/61 (04/29/19 0429)  SpO2: 97 % (04/29/19 0752) Vital Signs (24h Range):  Temp:  [97.8 °F (36.6 °C)-98.7 °F (37.1 °C)] 98.7 °F (37.1 °C)  Pulse:  [67-85] 73  Resp:  [16-18] 18  SpO2:  [95 %-97 %] 97 %  BP: (110-146)/(56-61) 146/61     Intake/Output - Last 3 Shifts       04/27 0700 - 04/28 0659 04/28 0700 - 04/29 0659 04/29 0700 - 04/30 0659    I.V. (mL/kg) 300 (3.7) 1320 (16.4)     Blood       Total Intake(mL/kg) 300 (3.7) 1320 (16.4)     Urine (mL/kg/hr) 1280 (0.7) 525 (0.3) 500 (5.3)    Total Output 1280 525 500    Net -980 +795 -500           Urine Occurrence  4 x     Stool Occurrence 0 x 1 x           SpO2: 97 %  O2 Device (Oxygen Therapy): room air    Physical Exam   Constitutional: He is oriented to person, place, and time. He appears well-developed and well-nourished. No distress.   HENT:   Head: Normocephalic and atraumatic.   Eyes: Right eye exhibits no discharge. No scleral icterus.   Neck: Normal range of motion. Neck supple.   Cardiovascular: Normal rate and regular rhythm.   Pulmonary/Chest: Effort normal and breath sounds normal. No stridor. No respiratory distress.   Abdominal: Soft. Bowel sounds are normal. He exhibits no distension. There is no tenderness.   Musculoskeletal: Normal range of motion. He exhibits no edema or deformity.   Neurological: He is alert and oriented to person, place, and time.   Skin: Skin is warm and dry.   Ecchymosis to bilateral upper extremities    Psychiatric: He has a normal mood and affect. His behavior is normal.        Significant Labs:  BMP:   Recent Labs   Lab 04/29/19  0352   GLU 96      K 3.9      CO2 18*   BUN 26*   CREATININE 1.3   CALCIUM 8.5*   MG 1.4*     CBC:   Recent Labs   Lab 04/29/19  0352   WBC 4.52   RBC 2.68*   HGB 7.5*   HCT 23.7*   *   MCV 88   MCH 28.0   MCHC 31.6*       Significant Diagnostics:  CXR: I have reviewed all pertinent results/findings within the past 24 hours   CAP   1.  Abnormal asymmetric wall thickening involving the left lateral aspect of the distal thoracic esophagus suspicious for underlying mass/malignancy as reportedly seen on prior endoscopic exam.  2.  Right upper lobe pulmonary micronodule.  For a solid nodule <6 mm, Fleischner Society 2017 guidelines recommend no routine follow up for a low risk patient, or follow-up with non-contrast chest CT at 12 months in a high risk patient.  3.  Right hepatic lobe cyst.  Additional subcentimeter hepatic hypodensities which are too small to accurately characterize.  4.  Subcentimeter right renal hypodensity, too small to accurately characterize.  Probable diverticulum arising from the left posterolateral bladder wall.  5.  2.0 cm hypoattenuating right thyroid lobe nodule.  Nonemergent thyroid ultrasound follow-up advised if not previously performed.  6.  Mildly dilated infrarenal abdominal aorta with prominent atherosclerotic plaque along its course.  7.  Diverticulosis without evidence of acute diverticulitis.    VTE Risk Mitigation (From admission, onward)        Ordered     Place sequential compression device  Until discontinued      04/27/19 0120     IP VTE HIGH RISK PATIENT  Once      04/27/19 0120     Reason for No Pharmacological VTE Prophylaxis  Once      04/27/19 0120        Assessment/Plan:     Esophageal mass    Needs EUS for staging per AES. Biopsies reported taken during last EGD although do not appear to be collected in computer. If unable to locate specimen needs additional biopsy.   Will likely need  neoadjuvant treatment prior to consideration for esophagectomy. Recommend consult to general surgery for pre-treatment jejunostomy feeding tube.     Will peripherally follow while in patient and coordinate clinic visit as outpatient once pathology and oncology visit set up.         Radha Ann PA-C  Thoracic Surgery  [facility]

## 2019-04-29 NOTE — SUBJECTIVE & OBJECTIVE
Interval History: NPO. On LR and pantoprazole infusions. CAP with IV contrast yesterday. On RA.     Medications:  Continuous Infusions:   lactated ringers 100 mL/hr (04/29/19 0445)    pantoprazole 40 mg in dextrose 5 % 100 mL infusion (ready to mix system) 8 mg/hr (04/29/19 0614)     Scheduled Meds:   BESIFLOXACIN HCL 0.6% EYE DROPS (patient's own med)  1 drop Right Eye TID    DUREZOL (DIFLUPREDNATE 0.05%) EYE DROPS (patient's own med)  1 drop Right Eye BID    ILEVO (NEPAFENAC 0.3%) EYE DROPS (patient's own med)  1 drop Right Eye Daily     PRN Meds:sodium chloride, acetaminophen, HYDROmorphone, ondansetron, ondansetron, oxyCODONE, sodium chloride 0.9%, sodium chloride 0.9%     Review of patient's allergies indicates:  No Known Allergies  Objective:     Vital Signs (Most Recent):  Temp: 98.7 °F (37.1 °C) (04/29/19 0752)  Pulse: 73 (04/29/19 0752)  Resp: 18 (04/29/19 0752)  BP: (!) 146/61 (04/29/19 0429)  SpO2: 97 % (04/29/19 0752) Vital Signs (24h Range):  Temp:  [97.8 °F (36.6 °C)-98.7 °F (37.1 °C)] 98.7 °F (37.1 °C)  Pulse:  [67-85] 73  Resp:  [16-18] 18  SpO2:  [95 %-97 %] 97 %  BP: (110-146)/(56-61) 146/61     Intake/Output - Last 3 Shifts       04/27 0700 - 04/28 0659 04/28 0700 - 04/29 0659 04/29 0700 - 04/30 0659    I.V. (mL/kg) 300 (3.7) 1320 (16.4)     Blood       Total Intake(mL/kg) 300 (3.7) 1320 (16.4)     Urine (mL/kg/hr) 1280 (0.7) 525 (0.3) 500 (5.3)    Total Output 1280 525 500    Net -980 +795 -500           Urine Occurrence  4 x     Stool Occurrence 0 x 1 x           SpO2: 97 %  O2 Device (Oxygen Therapy): room air    Physical Exam   Constitutional: He is oriented to person, place, and time. He appears well-developed and well-nourished. No distress.   HENT:   Head: Normocephalic and atraumatic.   Eyes: Right eye exhibits no discharge. No scleral icterus.   Neck: Normal range of motion. Neck supple.   Cardiovascular: Normal rate and regular rhythm.   Pulmonary/Chest: Effort normal and breath  sounds normal. No stridor. No respiratory distress.   Abdominal: Soft. Bowel sounds are normal. He exhibits no distension. There is no tenderness.   Musculoskeletal: Normal range of motion. He exhibits no edema or deformity.   Neurological: He is alert and oriented to person, place, and time.   Skin: Skin is warm and dry.   Ecchymosis to bilateral upper extremities    Psychiatric: He has a normal mood and affect. His behavior is normal.       Significant Labs:  BMP:   Recent Labs   Lab 04/29/19  0352   GLU 96      K 3.9      CO2 18*   BUN 26*   CREATININE 1.3   CALCIUM 8.5*   MG 1.4*     CBC:   Recent Labs   Lab 04/29/19  0352   WBC 4.52   RBC 2.68*   HGB 7.5*   HCT 23.7*   *   MCV 88   MCH 28.0   MCHC 31.6*       Significant Diagnostics:  CXR: I have reviewed all pertinent results/findings within the past 24 hours   CAP   1.  Abnormal asymmetric wall thickening involving the left lateral aspect of the distal thoracic esophagus suspicious for underlying mass/malignancy as reportedly seen on prior endoscopic exam.  2.  Right upper lobe pulmonary micronodule.  For a solid nodule <6 mm, Fleischner Society 2017 guidelines recommend no routine follow up for a low risk patient, or follow-up with non-contrast chest CT at 12 months in a high risk patient.  3.  Right hepatic lobe cyst.  Additional subcentimeter hepatic hypodensities which are too small to accurately characterize.  4.  Subcentimeter right renal hypodensity, too small to accurately characterize.  Probable diverticulum arising from the left posterolateral bladder wall.  5.  2.0 cm hypoattenuating right thyroid lobe nodule.  Nonemergent thyroid ultrasound follow-up advised if not previously performed.  6.  Mildly dilated infrarenal abdominal aorta with prominent atherosclerotic plaque along its course.  7.  Diverticulosis without evidence of acute diverticulitis.    VTE Risk Mitigation (From admission, onward)        Ordered     Place  sequential compression device  Until discontinued      04/27/19 0120     IP VTE HIGH RISK PATIENT  Once      04/27/19 0120     Reason for No Pharmacological VTE Prophylaxis  Once      04/27/19 0120

## 2019-04-29 NOTE — ASSESSMENT & PLAN NOTE
-Due to GI bleeding  -Received 1 unit PRBC 4/27  -Check iron, ferritin, b12 and folate  -Repeat h/h q12h, transfuse <7 g/dl.

## 2019-04-29 NOTE — CONSULTS
[facility]  Gastroenterology  Consult Note    Patient Name: Brooks Canas  MRN: [id number]  Admission Date: 4/26/2019  Hospital Length of Stay: 3 days  Code Status: Full Code   Attending Provider: Marybel Fernandez*   Consulting Provider: Loretta Larry MD  Primary Care Physician: Aris Sahu MD  Principal Problem:Acute upper GI bleed    Inpatient consult to [facility] ([facility])  Consult performed by: Loretta Larry MD  Consult ordered by: Marybel Fernandez MD  Reason for consult: EUS for staging        Subjective:     HPI:  Brooks Canas is a 69 y.o. male with a history of PAD s/p Right femoral popliteal bypass with reverse saphenous vein graft (2016), BPH, CKD stage III, HTN, hyperuricemia, and tobacco abuse who presented on 4/26/2019 as a transfer for treatment of hematemesis and newly diagnosed esophageal mass. He states that he ate a piece of chicken and was not able to keep it down and then had an episodes of bloody emesis. This was the first time this has happened. He also noted that this past week he has had dysphagia only with solid foods for the last 2-3 weeks. He states that he will have to wait a few minutes after he eat something solid for it to go down. He denies dysphagia with liquids, weight loss, anorexia, chest pain, dyspnea with exertion or persistent nausea. He is able to get around well without any significant issues except for some minor RLE discomfort when he ambulates for a while. He is a former smoker; he smoked 2ppd x40 years. He quit 3 years ago. He is on plavix for PAD, last took on Friday. With these sx he went to [facility] and He had an EGD performed on 4/27/19 which revealed a large, fungating mass with no bleeding and stigmata of recent bleeding was found in the lower third of the esophagus and at the gastroesophageal junction, 32 cm from the incisors. Biopsies obtained and pending. Since admission, he has  not had any more hematemesis. AES consulted for further evaluation and staging of the esophageal mass.        Past Medical History:   Diagnosis Date    BPH (benign prostatic hyperplasia)     Chronic kidney disease, stage III (moderate) 3/1/2016    12/10/2015: BUN/crea: 32/1.63. CrCl 43.    Claudication in peripheral vascular disease     History of tobacco use 11/4/2016    1970: Began smoking.   3/1/2016: Quit smoking.    Hypertension, benign 3/1/2016    2012: Diagnosed.    Hyperuricemia     Leg swelling 3/14/2016    3/4/2016: Began having right leg edema.    PAD (peripheral artery disease)     Peripheral artery disease 3/1/2016    2014: Began experience bilateral calf claudication.  1/2016: [facility]: Arterial Duplex: Right: SFA: distal severe. RAVI 0.78. Left: SFA: mid occlusion. RAVI 0.65. 2/1/2016: Began experience pain in right great toe.    Tobacco use 3/1/2016    1970: Began smoking. Unable to quit.    Vitamin D deficiency disease        Past Surgical History:   Procedure Laterality Date    ANGIOGRAM-EXTREMITY-LOWER Bilateral 3/2/2016    Performed by Phoenix Ayers MD at [facility] CATH LAB    AORTOGRAM WITH RUNOFF Bilateral 5/4/2017    Performed by Phoenix Ayers [facility] at [facility] CATH LAB    [id number] Right 3/4/2016    Performed by Aris Finney Jr., [facility] at [facility] OR    EGD (ESOPHAGOGASTRODUODENOSCOPY) Left 4/27/2019    Performed by Carine Le MD at [facility] ENDO    FEMORAL BYPASS Right 2016       Review of patient's allergies indicates:  No Known Allergies  Family History     None        Tobacco Use    Smoking status: Former Smoker     Packs/day: 0.75     Years: 46.00     Pack years: 34.50     Start date: 1/1/1970     Last attempt to quit: 3/1/2016     Years since quitting: 3.1    Smokeless tobacco: Never Used   Substance and Sexual Activity    Alcohol use: No     Alcohol/week: 0.0 oz    Drug use: Not on file    Sexual activity: Not on file     Review of Systems   Constitutional:  Positive for activity change and appetite change.   HENT: Positive for trouble swallowing. Negative for ear discharge and facial swelling.    Eyes: Negative for photophobia and redness.   Respiratory: Negative for wheezing and stridor.    Cardiovascular: Negative for chest pain and palpitations.   Gastrointestinal: Positive for blood in stool and vomiting. Negative for abdominal distention and abdominal pain.   Endocrine: Negative for cold intolerance and heat intolerance.   Genitourinary: Negative for dysuria and frequency.   Skin: Negative for color change and rash.   Allergic/Immunologic: Negative for food allergies.   Neurological: Negative for seizures and numbness.   Hematological: Does not bruise/bleed easily.   Psychiatric/Behavioral: Negative for agitation.     Objective:     Vital Signs (Most Recent):  Temp: 98.3 °F (36.8 °C) (04/29/19 1152)  Pulse: 70 (04/29/19 1152)  Resp: 18 (04/29/19 1152)  BP: (!) 142/65 (04/29/19 1152)  SpO2: 98 % (04/29/19 1152) Vital Signs (24h Range):  Temp:  [98.3 °F (36.8 °C)-98.7 °F (37.1 °C)] 98.3 °F (36.8 °C)  Pulse:  [68-85] 70  Resp:  [17-18] 18  SpO2:  [95 %-98 %] 98 %  BP: (110-146)/(56-65) 142/65     Weight: 81 kg (178 lb 9.2 oz) (04/27/19 0110)  Body mass index is 25.62 kg/m².      Intake/Output Summary (Last 24 hours) at 4/29/2019 1314  Last data filed at 4/29/2019 0756  Gross per 24 hour   Intake 1320 ml   Output 500 ml   Net 820 ml       Lines/Drains/Airways     Peripheral Intravenous Line                 Peripheral IV - Single Lumen 04/28/19 1746 18 G Left;Posterior Hand less than 1 day                Physical Exam   Constitutional: He is oriented to person, place, and time. He appears well-developed.   HENT:   Head: Normocephalic and atraumatic.   Eyes: No scleral icterus.   Neck: Neck supple.   Cardiovascular: Normal rate. Exam reveals no gallop and no friction rub.   Abdominal: Soft. Bowel sounds are normal. He exhibits no distension and no mass. There is no  "tenderness. There is no rebound and no guarding. No hernia.   Musculoskeletal: He exhibits no edema or tenderness.   Neurological: He is alert and oriented to person, place, and time.   Skin: Skin is warm.   Psychiatric: He has a normal mood and affect. His behavior is normal.   Vitals reviewed.      BP (!) 142/65 (BP Location: Left arm, Patient Position: Lying)   Pulse 70   Temp 98.3 °F (36.8 °C) (Oral)   Resp 18   Ht 5' 10" (1.778 m)   Wt 81 kg (178 lb 9.2 oz)   SpO2 98%   BMI 25.62 kg/m²    Lab Results   Component Value Date    WBC 4.52 04/29/2019    HGB 7.5 (L) 04/29/2019    HCT 23.7 (L) 04/29/2019    MCV 88 04/29/2019     (L) 04/29/2019     Lab Results   Component Value Date    INR 1.0 04/26/2019     Lab Results   Component Value Date     04/29/2019    K 3.9 04/29/2019    CREATININE 1.3 04/29/2019     Lab Results   Component Value Date    ALBUMIN 2.8 (L) 04/29/2019    ALT 8 (L) 04/29/2019    AST 14 04/29/2019    ALKPHOS 67 04/29/2019    BILITOT 0.5 04/29/2019     No results found for: AFP  Lab Results   Component Value Date    LIPASE 76 (H) 04/26/2019     No results found for: TACROLIMUS    Imaging:  Reviewed and as noted in HPI.         Assessment/Plan:     Esophageal mass  Brooks Canas is a 69 y.o. male with hematemesis and found to have esophageal mass.   Pending final biopsy.   AES consulted for further evaluation.    Plan  Recommend holding plavix if able to do so safely.   Await OSH biopsies.   OK trial of food, full liquid or soft diet.   EUS can be done as outpatient -> if remains in the hospital, we will try to accommodate and proceed with inpatient EUS.        Thank you for your consult. I will follow-up with patient. Please contact us if you have any additional questions.    Loretta Larry MD  Gastroenterology  [facility]  "

## 2019-04-29 NOTE — HPI
67 yo M with a history of PAD s/p Right femoral popliteal bypass with reverse saphenous vein graft (2016), BPH, CKD stage III, HTN, hyperuricemia, and tobacco abuse who presents as a transfer for hematemesis. He states that he ate a piece of chicken and was not able to keep it down and then had an episodes of bloody emesis. This was the first time this has happened. He also noted that this past week he has had dysphagia only with solid foods. He states that he will have to wait a few minutes after he eat something solid for it to go down. He denies dysphagia with liquids, weight loss, anorexia, chest pain, dyspnea with exertion or persistent nausea. He is able to get around well without any significant issues except for some minor RLE discomfort when he ambulates for a while. He is able to go up flights of stairs with minimal dyspnea. He is a former smoker; he smoked 2ppd x40 years. He quit 3 years ago. He is on plavix for PAD. He had an EGD performed on 4/27/19 which revealed a large, fungating mass with no bleeding and stigmata of recent bleeding was found in the lower third of the esophagus and at the gastroesophageal junction, 32 cm from the incisors. Since admission, he has not had any more hematemesis. Thoracic surgery consulted for further evaluation.

## 2019-04-29 NOTE — ASSESSMENT & PLAN NOTE
[facility]. Underwent EGD which found large circumferential mass of GEJ; biopsies taken.  -He continues on PPI  -Continue H/H q12h.  Transfuse for Hb < 7.0  -Hold aspirin and plavix.  -Trial CLD, with advancement to soft diet tomorrow if remains stable.

## 2019-04-29 NOTE — ASSESSMENT & PLAN NOTE
-Patient is in need of jejunostomy tube placement  -He last took his Plavix 3 days ago; will plan for open J tube placement in OR on Wednesday  -He is not currently on anticoagulation due to upper GI bleed

## 2019-04-29 NOTE — HPI
Brooks Canas is a 69 y.o. male with a history of PAD s/p Right femoral popliteal bypass with reverse saphenous vein graft (2016), BPH, CKD stage III, HTN, hyperuricemia, and tobacco abuse who presented on 4/26/2019 as a transfer for treatment of hematemesis and newly diagnosed esophageal mass. He states that he ate a piece of chicken and was not able to keep it down and then had an episodes of bloody emesis. This was the first time this has happened. He also noted that this past week he has had dysphagia only with solid foods for the last 2-3 weeks. He states that he will have to wait a few minutes after he eat something solid for it to go down. He denies dysphagia with liquids, weight loss, anorexia, chest pain, dyspnea with exertion or persistent nausea. He is able to get around well without any significant issues except for some minor RLE discomfort when he ambulates for a while. He is a former smoker; he smoked 2ppd x40 years. He quit 3 years ago. He is on plavix for PAD, last took on Friday. With these sx he went to [facility] and He had an EGD performed on 4/27/19 which revealed a large, fungating mass with no bleeding and stigmata of recent bleeding was found in the lower third of the esophagus and at the gastroesophageal junction, 32 cm from the incisors. Biopsies obtained and pending. Since admission, he has not had any more hematemesis. AES consulted for further evaluation and staging of the esophageal mass.

## 2019-04-29 NOTE — CONSULTS
[facility]  General Surgery  Consult Note    Patient Name: Brooks Canas  MRN: [id number]  Code Status: Full Code  Admission Date: 4/26/2019  Hospital Length of Stay: 3 days  Attending Physician: Marybel Fernandez*  Primary Care Provider: Aris Sahu MD    Patient information was obtained from patient.     Inpatient consult to General Surgery  Consult performed by: Edu Sommer Jr., MD  Consult ordered by: Marybel Fernandez MD  Reason for consult: eval for J tube placement for enteral feeding        Subjective:     Principal Problem: Acute upper GI bleed    History of Present Illness: Brooks Canas is a 69 y.o. male with a h/o PAD (s/p Right fem-pop bypass with SVG in 2016; on Plavix), BPH, CKD stage III, HTN, hyperuricemia, and tobacco abuse who presents as a transfer from OSH for hematemesis.  He reports having dysphagia to solids that has progressively worsened over time.  He denies weight loss, appetite changes, fever, chills, nausea, vomiting, chest pain, shortness of breath.  He has been diagnosed with esophageal cancer this admission.  He is in need of enteral access for feeding.  He last took his Plavix 3 days ago.  His abdominal surgical history is significant for open appendectomy.    No current facility-administered medications on file prior to encounter.      Current Outpatient Medications on File Prior to Encounter   Medication Sig    besifloxacin (BESIVANCE) 0.6 % DrpS 1 drop 3 (three) times daily.    nepafenac (ILEVRO) 0.3 % DrpS Apply 1 drop to eye once daily.    allopurinol (ZYLOPRIM) 100 MG tablet Take 100 mg by mouth once daily.    amLODIPine (NORVASC) 10 MG tablet TAKE 1 TABLET(10 MG) BY MOUTH EVERY DAY    atorvastatin (LIPITOR) 40 MG tablet TAKE 1 TABLET BY MOUTH ONCE DAILY    benazepril (LOTENSIN) 20 MG tablet TAKE 1 TABLET(20 MG) BY MOUTH EVERY DAY    carvedilol (COREG) 6.25 MG tablet TAKE 1 TABLET(6.25 MG) BY MOUTH TWICE DAILY     clopidogrel (PLAVIX) 75 mg tablet TAKE 1 TABLET BY MOUTH ONCE DAILY.    clopidogrel (PLAVIX) 75 mg tablet TAKE 1 TABLET BY MOUTH ONCE DAILY.    tamsulosin (FLOMAX) 0.4 mg Cp24 Take 0.4 mg by mouth once daily.        Review of patient's allergies indicates:  No Known Allergies    Past Medical History:   Diagnosis Date    BPH (benign prostatic hyperplasia)     Chronic kidney disease, stage III (moderate) 3/1/2016    12/10/2015: BUN/crea: 32/1.63. CrCl 43.    Claudication in peripheral vascular disease     History of tobacco use 11/4/2016    1970: Began smoking.   3/1/2016: Quit smoking.    Hypertension, benign 3/1/2016    2012: Diagnosed.    Hyperuricemia     Leg swelling 3/14/2016    3/4/2016: Began having right leg edema.    PAD (peripheral artery disease)     Peripheral artery disease 3/1/2016    2014: Began experience bilateral calf claudication.  1/2016: [facility]: Arterial Duplex: Right: SFA: distal severe. RAVI 0.78. Left: SFA: mid occlusion. RAVI 0.65. 2/1/2016: Began experience pain in right great toe.    Tobacco use 3/1/2016    1970: Began smoking. Unable to quit.    Vitamin D deficiency disease      Past Surgical History:   Procedure Laterality Date    ANGIOGRAM-EXTREMITY-LOWER Bilateral 3/2/2016    Performed by [facility]yers [facility] at [facility] CATH LAB    AORTOGRAM WITH RUNOFF Bilateral 5/4/2017    Performed by Phoenix Ayers [facility] at [facility] CATH LAB    [id number] Right 3/4/2016    Performed by [facility]ney Jr., [facility] at [facility] OR    EGD (ESOPHAGOGASTRODUODENOSCOPY) Left 4/27/2019    Performed by Carnie Le MD at [facility] ENDO    FEMORAL BYPASS Right 2016     Family History     None        Tobacco Use    Smoking status: Former Smoker     Packs/day: 0.75     Years: 46.00     Pack years: 34.50     Start date: 1/1/1970     Last attempt to quit: 3/1/2016     Years since quitting: 3.1    Smokeless tobacco: Never Used   Substance and Sexual Activity    Alcohol use: No     Alcohol/week: 0.0 oz     Drug use: Not on file    Sexual activity: Not on file     Review of Systems   Constitutional: Negative for activity change, appetite change, chills, fatigue, fever and unexpected weight change.   HENT: Positive for trouble swallowing. Negative for voice change.    Respiratory: Negative for cough and shortness of breath.    Cardiovascular: Negative.    Gastrointestinal: Negative.    Genitourinary: Negative.    Musculoskeletal: Negative.    Neurological: Negative.    Hematological: Negative.      Objective:     Vital Signs (Most Recent):  Temp: 98.3 °F (36.8 °C) (04/29/19 1152)  Pulse: 70 (04/29/19 1152)  Resp: 18 (04/29/19 1152)  BP: (!) 142/65 (04/29/19 1152)  SpO2: 98 % (04/29/19 1152) Vital Signs (24h Range):  Temp:  [98.3 °F (36.8 °C)-98.7 °F (37.1 °C)] 98.3 °F (36.8 °C)  Pulse:  [70-85] 70  Resp:  [17-18] 18  SpO2:  [95 %-98 %] 98 %  BP: (110-146)/(56-65) 142/65     Weight: 81 kg (178 lb 9.2 oz)  Body mass index is 25.62 kg/m².    Physical Exam   Constitutional: He is oriented to person, place, and time. He appears well-developed and well-nourished. No distress.   HENT:   Head: Normocephalic and atraumatic.   Eyes: Conjunctivae and EOM are normal. No scleral icterus.   Neck: Normal range of motion. Neck supple.   Cardiovascular: Normal rate and regular rhythm.   Pulmonary/Chest: Effort normal. No respiratory distress.   Abdominal: Soft. He exhibits no distension. There is no tenderness.   Musculoskeletal: Normal range of motion. He exhibits no edema.   Neurological: He is alert and oriented to person, place, and time.   Nursing note and vitals reviewed.    Significant Labs:  CBC:   Recent Labs   Lab 04/29/19 0352   WBC 4.52   RBC 2.68*   HGB 7.5*   HCT 23.7*   *   MCV 88   MCH 28.0   MCHC 31.6*     CMP:   Recent Labs   Lab 04/29/19 0352   GLU 96   CALCIUM 8.5*   ALBUMIN 2.8*   PROT 4.9*      K 3.9   CO2 18*      BUN 26*   CREATININE 1.3   ALKPHOS 67   ALT 8*   AST 14   BILITOT 0.5      Significant Diagnostics:  I have reviewed and interpreted all pertinent imaging results/findings within the past 24 hours.    Assessment/Plan:     Esophageal mass  -Patient is in need of jejunostomy tube placement  -He last took his Plavix 3 days ago; will plan for open J tube placement in OR on Wednesday  -He is not currently on anticoagulation due to upper GI bleed      VTE Risk Mitigation (From admission, onward)        Ordered     Place sequential compression device  Until discontinued      04/27/19 0120     IP VTE HIGH RISK PATIENT  Once      04/27/19 0120     Reason for No Pharmacological VTE Prophylaxis  Once      04/27/19 0120          Thank you for your consult. I will follow-up with patient. Please contact us if you have any additional questions.    Edu Conroy Jr., MD  General Surgery  [facility]      I have personally performed a detailed history and physical examination on this patient. My findings are summarized in the resident's note included in the record.

## 2019-04-29 NOTE — SUBJECTIVE & OBJECTIVE
Past Medical History:   Diagnosis Date    BPH (benign prostatic hyperplasia)     Chronic kidney disease, stage III (moderate) 3/1/2016    12/10/2015: BUN/crea: 32/1.63. CrCl 43.    Claudication in peripheral vascular disease     History of tobacco use 11/4/2016    1970: Began smoking.   3/1/2016: Quit smoking.    Hypertension, benign 3/1/2016    2012: Diagnosed.    Hyperuricemia     Leg swelling 3/14/2016    3/4/2016: Began having right leg edema.    PAD (peripheral artery disease)     Peripheral artery disease 3/1/2016    2014: Began experience bilateral calf claudication.  1/2016: [facility]: Arterial Duplex: Right: SFA: distal severe. RAVI 0.78. Left: SFA: mid occlusion. RAVI 0.65. 2/1/2016: Began experience pain in right great toe.    Tobacco use 3/1/2016    1970: Began smoking. Unable to quit.    Vitamin D deficiency disease        Past Surgical History:   Procedure Laterality Date    ANGIOGRAM-EXTREMITY-LOWER Bilateral 3/2/2016    Performed by [facility] at [facility] CATH LAB    AORTOGRAM WITH RUNOFF Bilateral 5/4/2017    Performed by Phoenix Ayers [facility] at [facility] CATH LAB    [id number] Right 3/4/2016    Performed by Aris Finney Jr., MD at [facility] OR    EGD (ESOPHAGOGASTRODUODENOSCOPY) Left 4/27/2019    Performed by Carine Le MD at [facility] ENDO    FEMORAL BYPASS Right 2016       Review of patient's allergies indicates:  No Known Allergies  Family History     None        Tobacco Use    Smoking status: Former Smoker     Packs/day: 0.75     Years: 46.00     Pack years: 34.50     Start date: 1/1/1970     Last attempt to quit: 3/1/2016     Years since quitting: 3.1    Smokeless tobacco: Never Used   Substance and Sexual Activity    Alcohol use: No     Alcohol/week: 0.0 oz    Drug use: Not on file    Sexual activity: Not on file     Review of Systems   Constitutional: Positive for activity change and appetite change.   HENT: Positive for trouble swallowing. Negative for ear discharge  and facial swelling.    Eyes: Negative for photophobia and redness.   Respiratory: Negative for wheezing and stridor.    Cardiovascular: Negative for chest pain and palpitations.   Gastrointestinal: Positive for blood in stool and vomiting. Negative for abdominal distention and abdominal pain.   Endocrine: Negative for cold intolerance and heat intolerance.   Genitourinary: Negative for dysuria and frequency.   Skin: Negative for color change and rash.   Allergic/Immunologic: Negative for food allergies.   Neurological: Negative for seizures and numbness.   Hematological: Does not bruise/bleed easily.   Psychiatric/Behavioral: Negative for agitation.     Objective:     Vital Signs (Most Recent):  Temp: 98.3 °F (36.8 °C) (04/29/19 1152)  Pulse: 70 (04/29/19 1152)  Resp: 18 (04/29/19 1152)  BP: (!) 142/65 (04/29/19 1152)  SpO2: 98 % (04/29/19 1152) Vital Signs (24h Range):  Temp:  [98.3 °F (36.8 °C)-98.7 °F (37.1 °C)] 98.3 °F (36.8 °C)  Pulse:  [68-85] 70  Resp:  [17-18] 18  SpO2:  [95 %-98 %] 98 %  BP: (110-146)/(56-65) 142/65     Weight: 81 kg (178 lb 9.2 oz) (04/27/19 0110)  Body mass index is 25.62 kg/m².      Intake/Output Summary (Last 24 hours) at 4/29/2019 1314  Last data filed at 4/29/2019 0756  Gross per 24 hour   Intake 1320 ml   Output 500 ml   Net 820 ml       Lines/Drains/Airways     Peripheral Intravenous Line                 Peripheral IV - Single Lumen 04/28/19 1746 18 G Left;Posterior Hand less than 1 day                Physical Exam   Constitutional: He is oriented to person, place, and time. He appears well-developed.   HENT:   Head: Normocephalic and atraumatic.   Eyes: No scleral icterus.   Neck: Neck supple.   Cardiovascular: Normal rate. Exam reveals no gallop and no friction rub.   Abdominal: Soft. Bowel sounds are normal. He exhibits no distension and no mass. There is no tenderness. There is no rebound and no guarding. No hernia.   Musculoskeletal: He exhibits no edema or tenderness.  "  Neurological: He is alert and oriented to person, place, and time.   Skin: Skin is warm.   Psychiatric: He has a normal mood and affect. His behavior is normal.   Vitals reviewed.      BP (!) 142/65 (BP Location: Left arm, Patient Position: Lying)   Pulse 70   Temp 98.3 °F (36.8 °C) (Oral)   Resp 18   Ht 5' 10" (1.778 m)   Wt 81 kg (178 lb 9.2 oz)   SpO2 98%   BMI 25.62 kg/m²   Lab Results   Component Value Date    WBC 4.52 04/29/2019    HGB 7.5 (L) 04/29/2019    HCT 23.7 (L) 04/29/2019    MCV 88 04/29/2019     (L) 04/29/2019     Lab Results   Component Value Date    INR 1.0 04/26/2019     Lab Results   Component Value Date     04/29/2019    K 3.9 04/29/2019    CREATININE 1.3 04/29/2019     Lab Results   Component Value Date    ALBUMIN 2.8 (L) 04/29/2019    ALT 8 (L) 04/29/2019    AST 14 04/29/2019    ALKPHOS 67 04/29/2019    BILITOT 0.5 04/29/2019     No results found for: AFP  Lab Results   Component Value Date    LIPASE 76 (H) 04/26/2019     No results found for: TACROLIMUS    Imaging:  Reviewed and as noted in HPI.       "

## 2019-04-29 NOTE — PLAN OF CARE
Problem: Adult Inpatient Plan of Care  Goal: Plan of Care Review  Outcome: Ongoing (interventions implemented as appropriate)  POC reviewed with patient. Verbalizes understanding. AAOx4. VSS. Patient denies any pain at this time. Remained NPO throughout the shift. No BM reported. IV fluids remain infusing at ordered rate, along with the continuous pantoprazole infusion. Ambulated in room independently.  Hygiene care provided. Patient provided instructions on plans for tomorrow. Patient instructed to call with further concerns or questions. JEANINE.

## 2019-04-29 NOTE — ASSESSMENT & PLAN NOTE
Needs EUS for staging per AES. Biopsies reported taken during last EGD although do not appear to be collected in computer. If unable to locate specimen needs additional biopsy.   Will likely need neoadjuvant treatment prior to consideration for esophagectomy. Recommend consult to general surgery for pre-treatment jejunostomy feeding tube.     Will peripherally follow while in patient and coordinate clinic visit as outpatient once pathology and oncology visit set up.

## 2019-04-29 NOTE — ASSESSMENT & PLAN NOTE
-Baseline Cr 1.4-1.6, currently 1.3/below baseline.  -Will trend, renally dose renally cleared medications.

## 2019-04-29 NOTE — PLAN OF CARE
"Problem: Adult Inpatient Plan of Care  Goal: Plan of Care Review  Outcome: Ongoing (interventions implemented as appropriate)  Patient reports mild, "nagging" headache throughout this shift.  He refused offers for PO Tylenol and insisted that he preferred to manage pain nonpharmacologically in order to maintain NPO status and prevent recurrence of esophageal bleeding.  He reports difficulty staying asleep overnight.  His sister Maureen and friend Wesley remain at bedside throughout the night.  Safety maintained with call bell, urinal, and personal items within reach.  Bed low/locked and floor free of clutter.  Will continue to monitor and give report to oncoming shift.      "

## 2019-04-29 NOTE — ASSESSMENT & PLAN NOTE
Brooks Canas is a 69 y.o. male with hematemesis and found to have esophageal mass.   Pending final biopsy.   AES consulted for further evaluation.    Plan  Recommend holding plavix if able to do so safely.   Await OSH biopsies.   OK trial of food, full liquid or soft diet.   EUS can be done as outpatient -> if remains in the hospital, we will try to accommodate and proceed with inpatient EUS.

## 2019-04-30 ENCOUNTER — ANESTHESIA EVENT (OUTPATIENT)
Dept: SURGERY | Facility: HOSPITAL | Age: 69
DRG: 375 | End: 2019-04-30
Payer: MEDICARE

## 2019-04-30 LAB
ABO + RH BLD: NORMAL
ALBUMIN SERPL BCP-MCNC: 2.9 G/DL (ref 3.5–5.2)
ALP SERPL-CCNC: 71 U/L (ref 55–135)
ALT SERPL W/O P-5'-P-CCNC: 11 U/L (ref 10–44)
ANION GAP SERPL CALC-SCNC: 9 MMOL/L (ref 8–16)
AST SERPL-CCNC: 14 U/L (ref 10–40)
BASOPHILS # BLD AUTO: 0.01 K/UL (ref 0–0.2)
BASOPHILS # BLD AUTO: 0.03 K/UL (ref 0–0.2)
BASOPHILS NFR BLD: 0.3 % (ref 0–1.9)
BASOPHILS NFR BLD: 0.9 % (ref 0–1.9)
BILIRUB SERPL-MCNC: 0.6 MG/DL (ref 0.1–1)
BLD GP AB SCN CELLS X3 SERPL QL: NORMAL
BUN SERPL-MCNC: 17 MG/DL (ref 8–23)
CALCIUM SERPL-MCNC: 8.5 MG/DL (ref 8.7–10.5)
CHLORIDE SERPL-SCNC: 109 MMOL/L (ref 95–110)
CO2 SERPL-SCNC: 23 MMOL/L (ref 23–29)
CREAT SERPL-MCNC: 1.3 MG/DL (ref 0.5–1.4)
DIFFERENTIAL METHOD: ABNORMAL
DIFFERENTIAL METHOD: ABNORMAL
EOSINOPHIL # BLD AUTO: 0.1 K/UL (ref 0–0.5)
EOSINOPHIL # BLD AUTO: 0.1 K/UL (ref 0–0.5)
EOSINOPHIL NFR BLD: 1.4 % (ref 0–8)
EOSINOPHIL NFR BLD: 2 % (ref 0–8)
ERYTHROCYTE [DISTWIDTH] IN BLOOD BY AUTOMATED COUNT: 15.1 % (ref 11.5–14.5)
ERYTHROCYTE [DISTWIDTH] IN BLOOD BY AUTOMATED COUNT: 15.4 % (ref 11.5–14.5)
EST. GFR  (AFRICAN AMERICAN): >60 ML/MIN/1.73 M^2
EST. GFR  (NON AFRICAN AMERICAN): 54.9 ML/MIN/1.73 M^2
GLUCOSE SERPL-MCNC: 98 MG/DL (ref 70–110)
HCT VFR BLD AUTO: 23.8 % (ref 40–54)
HCT VFR BLD AUTO: 26.3 % (ref 40–54)
HGB BLD-MCNC: 7.7 G/DL (ref 14–18)
HGB BLD-MCNC: 8.3 G/DL (ref 14–18)
IMM GRANULOCYTES # BLD AUTO: 0.01 K/UL (ref 0–0.04)
IMM GRANULOCYTES # BLD AUTO: 0.01 K/UL (ref 0–0.04)
IMM GRANULOCYTES NFR BLD AUTO: 0.3 % (ref 0–0.5)
IMM GRANULOCYTES NFR BLD AUTO: 0.3 % (ref 0–0.5)
LYMPHOCYTES # BLD AUTO: 0.7 K/UL (ref 1–4.8)
LYMPHOCYTES # BLD AUTO: 0.8 K/UL (ref 1–4.8)
LYMPHOCYTES NFR BLD: 20.3 % (ref 18–48)
LYMPHOCYTES NFR BLD: 21.7 % (ref 18–48)
MAGNESIUM SERPL-MCNC: 1.5 MG/DL (ref 1.6–2.6)
MCH RBC QN AUTO: 28.1 PG (ref 27–31)
MCH RBC QN AUTO: 28.6 PG (ref 27–31)
MCHC RBC AUTO-ENTMCNC: 31.6 G/DL (ref 32–36)
MCHC RBC AUTO-ENTMCNC: 32.4 G/DL (ref 32–36)
MCV RBC AUTO: 89 FL (ref 82–98)
MCV RBC AUTO: 89 FL (ref 82–98)
MONOCYTES # BLD AUTO: 0.4 K/UL (ref 0.3–1)
MONOCYTES # BLD AUTO: 0.4 K/UL (ref 0.3–1)
MONOCYTES NFR BLD: 10 % (ref 4–15)
MONOCYTES NFR BLD: 12.2 % (ref 4–15)
NEUTROPHILS # BLD AUTO: 2.2 K/UL (ref 1.8–7.7)
NEUTROPHILS # BLD AUTO: 2.3 K/UL (ref 1.8–7.7)
NEUTROPHILS NFR BLD: 64.3 % (ref 38–73)
NEUTROPHILS NFR BLD: 66.3 % (ref 38–73)
NRBC BLD-RTO: 0 /100 WBC
NRBC BLD-RTO: 0 /100 WBC
PLATELET # BLD AUTO: 124 K/UL (ref 150–350)
PLATELET # BLD AUTO: 139 K/UL (ref 150–350)
PMV BLD AUTO: 10.9 FL (ref 9.2–12.9)
PMV BLD AUTO: 11 FL (ref 9.2–12.9)
POTASSIUM SERPL-SCNC: 4.2 MMOL/L (ref 3.5–5.1)
PROT SERPL-MCNC: 5.1 G/DL (ref 6–8.4)
RBC # BLD AUTO: 2.69 M/UL (ref 4.6–6.2)
RBC # BLD AUTO: 2.95 M/UL (ref 4.6–6.2)
SODIUM SERPL-SCNC: 141 MMOL/L (ref 136–145)
WBC # BLD AUTO: 3.44 K/UL (ref 3.9–12.7)
WBC # BLD AUTO: 3.51 K/UL (ref 3.9–12.7)

## 2019-04-30 PROCEDURE — 99223 1ST HOSP IP/OBS HIGH 75: CPT | Mod: ,,, | Performed by: INTERNAL MEDICINE

## 2019-04-30 PROCEDURE — C9113 INJ PANTOPRAZOLE SODIUM, VIA: HCPCS | Performed by: EMERGENCY MEDICINE

## 2019-04-30 PROCEDURE — 63600175 PHARM REV CODE 636 W HCPCS: Performed by: EMERGENCY MEDICINE

## 2019-04-30 PROCEDURE — 80053 COMPREHEN METABOLIC PANEL: CPT

## 2019-04-30 PROCEDURE — 20600001 HC STEP DOWN PRIVATE ROOM

## 2019-04-30 PROCEDURE — 99233 PR SUBSEQUENT HOSPITAL CARE,LEVL III: ICD-10-PCS | Mod: ,,, | Performed by: HOSPITALIST

## 2019-04-30 PROCEDURE — 83735 ASSAY OF MAGNESIUM: CPT

## 2019-04-30 PROCEDURE — 25000003 PHARM REV CODE 250: Performed by: INTERNAL MEDICINE

## 2019-04-30 PROCEDURE — 99233 SBSQ HOSP IP/OBS HIGH 50: CPT | Mod: ,,, | Performed by: HOSPITALIST

## 2019-04-30 PROCEDURE — 85025 COMPLETE CBC W/AUTO DIFF WBC: CPT | Mod: 91

## 2019-04-30 PROCEDURE — 36415 COLL VENOUS BLD VENIPUNCTURE: CPT

## 2019-04-30 PROCEDURE — 25000003 PHARM REV CODE 250: Performed by: NURSE PRACTITIONER

## 2019-04-30 PROCEDURE — 99223 PR INITIAL HOSPITAL CARE,LEVL III: ICD-10-PCS | Mod: ,,, | Performed by: INTERNAL MEDICINE

## 2019-04-30 PROCEDURE — 86901 BLOOD TYPING SEROLOGIC RH(D): CPT

## 2019-04-30 RX ADMIN — SODIUM CHLORIDE, SODIUM LACTATE, POTASSIUM CHLORIDE, AND CALCIUM CHLORIDE 100 ML/HR: 600; 310; 30; 20 INJECTION, SOLUTION INTRAVENOUS at 09:04

## 2019-04-30 RX ADMIN — ACETAMINOPHEN 650 MG: 325 TABLET ORAL at 10:04

## 2019-04-30 RX ADMIN — PANTOPRAZOLE SODIUM 8 MG/HR: 40 INJECTION, POWDER, FOR SOLUTION INTRAVENOUS at 05:04

## 2019-04-30 RX ADMIN — PANTOPRAZOLE SODIUM 8 MG/HR: 40 INJECTION, POWDER, FOR SOLUTION INTRAVENOUS at 04:04

## 2019-04-30 RX ADMIN — SODIUM CHLORIDE, SODIUM LACTATE, POTASSIUM CHLORIDE, AND CALCIUM CHLORIDE 100 ML/HR: 600; 310; 30; 20 INJECTION, SOLUTION INTRAVENOUS at 05:04

## 2019-04-30 RX ADMIN — PANTOPRAZOLE SODIUM 8 MG/HR: 40 INJECTION, POWDER, FOR SOLUTION INTRAVENOUS at 11:04

## 2019-04-30 RX ADMIN — PANTOPRAZOLE SODIUM 8 MG/HR: 40 INJECTION, POWDER, FOR SOLUTION INTRAVENOUS at 10:04

## 2019-04-30 NOTE — PLAN OF CARE
Problem: Adult Inpatient Plan of Care  Goal: Plan of Care Review  Outcome: Ongoing (interventions implemented as appropriate)  Plan of care reviewed with patient. Verbalizes understanding. AAOx4. VSS. Patient denies pain at this time. Patient tolerating diet. No reports of nausea or vomiting noted. IV fluids and pantoprazole infusing as ordered. Patient informed of impending surgery tomorrow. Instructed to call with any questions or concerns. Call light within reach. TM.

## 2019-04-30 NOTE — SUBJECTIVE & OBJECTIVE
Interval History:     NAEO. Patient to the OR tomorrow for Open Jejunostomy placement.     Medications:  Continuous Infusions:   lactated ringers 100 mL/hr (04/30/19 0914)    pantoprazole 40 mg in dextrose 5 % 100 mL infusion (ready to mix system) 8 mg/hr (04/30/19 0437)     Scheduled Meds:   BESIFLOXACIN HCL 0.6% EYE DROPS (patient's own med)  1 drop Right Eye TID    DUREZOL (DIFLUPREDNATE 0.05%) EYE DROPS (patient's own med)  1 drop Right Eye BID    ILEVO (NEPAFENAC 0.3%) EYE DROPS (patient's own med)  1 drop Right Eye Daily     PRN Meds:sodium chloride, acetaminophen, HYDROmorphone, ondansetron, ondansetron, oxyCODONE, sodium chloride 0.9%, sodium chloride 0.9%     Review of patient's allergies indicates:  No Known Allergies  Objective:     Vital Signs (Most Recent):  Temp: 98.3 °F (36.8 °C) (04/30/19 0723)  Pulse: 67 (04/30/19 0723)  Resp: 17 (04/30/19 0723)  BP: (!) 146/70 (04/30/19 0723)  SpO2: 98 % (04/30/19 0723) Vital Signs (24h Range):  Temp:  [98.2 °F (36.8 °C)-98.6 °F (37 °C)] 98.3 °F (36.8 °C)  Pulse:  [61-82] 67  Resp:  [17-18] 17  SpO2:  [96 %-100 %] 98 %  BP: (121-148)/(58-72) 146/70     Weight: 81 kg (178 lb 9.2 oz)  Body mass index is 25.62 kg/m².    Intake/Output - Last 3 Shifts       04/28 0700 - 04/29 0659 04/29 0700 - 04/30 0659 04/30 0700 - 05/01 0659    P.O.  240     I.V. (mL/kg) 1320 (16.4) 3038.3 (37.6)     Total Intake(mL/kg) 1320 (16.4) 3278.3 (40.6)     Urine (mL/kg/hr) 525 (0.3) 1410 (0.7)     Stool  0     Total Output 525 1410     Net +795 +1868.3            Urine Occurrence 4 x      Stool Occurrence 1 x 0 x 0 x          Physical Exam     Constitutional: He is oriented to person, place, and time. He appears well-developed and well-nourished. No distress.   HENT:   Head: Normocephalic and atraumatic.   Eyes: Conjunctivae and EOM are normal. No scleral icterus.   Neck: Normal range of motion. Neck supple.   Cardiovascular: Normal rate and regular rhythm.   Pulmonary/Chest: Effort  normal. No respiratory distress.   Abdominal: Soft. He exhibits no distension. There is no tenderness.   Musculoskeletal: Normal range of motion. He exhibits no edema.   Neurological: He is alert and oriented to person, place, and time.   Nursing note and vitals reviewed.      Significant Labs:    CBC:   Recent Labs   Lab 04/30/19  0350   WBC 3.44*   RBC 2.69*   HGB 7.7*   HCT 23.8*   *   MCV 89   MCH 28.6   MCHC 32.4     CMP:   Recent Labs   Lab 04/30/19  0350   GLU 98   CALCIUM 8.5*   ALBUMIN 2.9*   PROT 5.1*      K 4.2   CO2 23      BUN 17   CREATININE 1.3   ALKPHOS 71   ALT 11   AST 14   BILITOT 0.6

## 2019-04-30 NOTE — PLAN OF CARE
Problem: Adult Inpatient Plan of Care  Goal: Plan of Care Review  Outcome: Ongoing (interventions implemented as appropriate)  Pt alert and oriented medicated x1 with 650 mg of tylenol for HA with effective results. Continues IV fluids with IV protonix tolerating medications well Continues clear liquid diet VS stable

## 2019-04-30 NOTE — ANESTHESIA PREPROCEDURE EVALUATION
[facility]  Anesthesia Pre-Operative Evaluation         Patient Name: Brooks Canas  YOB: 1950  MRN: [id number]    SUBJECTIVE:     04/30/2019    Pre-operative evaluation for Procedure(s) (LRB):  INSERTION, JEJUNOSTOMY TUBE (N/A)    Brooks Canas is a 69 y.o. male with PAD (s/p Right fem-pop bypass with SVG in 2016; on Plavix), BPH, CKD stage III, HTN, hyperuricemia, and tobacco abuse who presented as a transfer from [facility] on 4/26 for hematemesis.  CT scan suspicious for malignancy of the distal esophagus.  EGD performed revealing large fungating mass in the lower third of the esophagus.  Biopsies pending.         Patient now presents for the above procedure(s).      LDA:        Peripheral IV - Single Lumen 04/28/19 1746 18 G Left;Posterior Hand (Active)   Site Assessment Dry;Intact 4/29/2019  8:00 AM   Line Status Infusing 4/29/2019  8:00 AM   Dressing Status Intact 4/29/2019  8:00 AM   Site Change Due 05/01/19 4/29/2019  8:00 AM   Reason Not Rotated Not due 4/29/2019  8:00 AM   Number of days: 1       Previous airway:   Easy mask ventilation.  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      Current Outpatient Medications on File Prior to Encounter   Medication Sig Dispense Refill    besifloxacin (BESIVANCE) 0.6 % DrpS 1 drop 3 (three) times daily.      nepafenac (ILEVRO) 0.3 % DrpS Apply 1 drop to eye once daily.      allopurinol (ZYLOPRIM) 100 MG tablet Take 100 mg by mouth once daily.  3    amLODIPine (NORVASC) 10 MG tablet TAKE 1 TABLET(10 MG) BY MOUTH EVERY DAY 90 tablet 0    atorvastatin (LIPITOR) 40 MG tablet TAKE 1 TABLET BY MOUTH ONCE DAILY 30 tablet 0    benazepril (LOTENSIN) 20 MG tablet TAKE 1 TABLET(20 MG) BY MOUTH EVERY DAY 90 tablet 0    carvedilol (COREG) 6.25 MG tablet TAKE 1 TABLET(6.25 MG) BY MOUTH TWICE DAILY 180 tablet 0    clopidogrel (PLAVIX) 75 mg tablet TAKE 1 TABLET BY MOUTH ONCE DAILY. 90 tablet 0    clopidogrel (PLAVIX) 75 mg tablet TAKE 1 TABLET BY MOUTH ONCE DAILY. 90 tablet 0    tamsulosin (FLOMAX) 0.4 mg Cp24 Take 0.4 mg by mouth once daily.   2       Past Surgical History:   Procedure Laterality Date    ANGIOGRAM-EXTREMITY-LOWER Bilateral 3/2/2016    Performed by Phoenix Ayers MD at [facility] CATH LAB    AORTOGRAM WITH RUNOFF Bilateral 5/4/2017    Performed by Phoenix Ayers MD at [facility] CATH LAB    [id number] Right 3/4/2016    Performed by Aris Finney Jr., MD at [facility] OR    EGD (ESOPHAGOGASTRODUODENOSCOPY) Left 4/27/2019    Performed by Carine Le MD at [facility] ENDO    FEMORAL BYPASS Right 2016       Social History     Socioeconomic History    Marital status: Single     Spouse name: Not on file    Number of children: Not on file    Years of education: Not on file    Highest education level: Not on file   Occupational History    Not on file   Social Needs    Financial resource strain: Not on file    Food insecurity:     Worry: Not on file     Inability: Not on file    Transportation needs:     Medical: Not  on file     Non-medical: Not on file   Tobacco Use    Smoking status: Former Smoker     Packs/day: 0.75     Years: 46.00     Pack years: 34.50     Start date: 1/1/1970     Last attempt to quit: 3/1/2016     Years since quitting: 3.1    Smokeless tobacco: Never Used   Substance and Sexual Activity    Alcohol use: No     Alcohol/week: 0.0 oz    Drug use: Not on file    Sexual activity: Not on file   Lifestyle    Physical activity:     Days per week: Not on file     Minutes per session: Not on file    Stress: Not on file   Relationships    Social connections:     Talks on phone: Not on file     Gets together: Not on file     Attends [employer] service: Not on file     Active member of club or organization: Not on file     Attends meetings of clubs or organizations: Not on file     Relationship status: Not on file   Other Topics Concern    Not on file   Social History Narrative    Not on file       OBJECTIVE:     Vital Signs Range (Last 24H):  Temp:  [36.8 °C (98.2 °F)-37 °C (98.6 °F)]   Pulse:  [61-82]   Resp:  [17-18]   BP: (121-148)/(58-72)   SpO2:  [96 %-100 %]       Significant Labs:  Lab Results   Component Value Date    WBC 3.44 (L) 04/30/2019    HGB 7.7 (L) 04/30/2019    HCT 23.8 (L) 04/30/2019     (L) 04/30/2019    CHOL 144 08/31/2016    TRIG 135 08/31/2016    HDL 37 (L) 08/31/2016    ALT 11 04/30/2019    AST 14 04/30/2019     04/30/2019    K 4.2 04/30/2019     04/30/2019    CREATININE 1.3 04/30/2019    BUN 17 04/30/2019    CO2 23 04/30/2019    INR 1.0 04/26/2019         Diagnostic Studies:  CT Chest Abdomen Pelvis with Contrast    1.  Abnormal asymmetric wall thickening involving the left lateral aspect of the distal thoracic esophagus suspicious for underlying mass/malignancy as reportedly seen on prior endoscopic exam.    2.  Right upper lobe pulmonary micronodule.  For a solid nodule <6 mm, Fleischner Society 2017 guidelines recommend no routine follow up for a low risk patient,  or follow-up with non-contrast chest CT at 12 months in a high risk patient.    3.  Right hepatic lobe cyst.  Additional subcentimeter hepatic hypodensities which are too small to accurately characterize.    4.  Subcentimeter right renal hypodensity, too small to accurately characterize.  Probable diverticulum arising from the left posterolateral bladder wall.    5.  2.0 cm hypoattenuating right thyroid lobe nodule.  Nonemergent thyroid ultrasound follow-up advised if not previously performed.    6.  Mildly dilated infrarenal abdominal aorta with prominent atherosclerotic plaque along its course.    7.  Diverticulosis without evidence of acute diverticulitis.    8.  Additional incidental findings as above.      Cardiac Studies:  EKG:   Vent. Rate : 078 BPM     Atrial Rate : 078 BPM     P-R Int : 000 ms          QRS Dur : 084 ms      QT Int : 348 ms       P-R-T Axes : 000 -17 052 degrees     QTc Int : 397 ms    Sinus rhythm  Low voltage QRS  Cannot rule out Anterior infarct ,age undetermined  Abnormal ECG    Confirmed by Phoenix Ayers MD ([id number]) on 4/27/2019 5:13:27 PM    2D Echo:  No results found for this or any previous visit.      ASSESSMENT/PLAN:     Anesthesia Evaluation    I have reviewed the Patient Summary Reports.    I have reviewed the Nursing Notes.   I have reviewed the Medications.     Review of Systems  Anesthesia Hx:  No problems with previous Anesthesia  History of prior surgery of interest to airway management or planning: Denies Family Hx of Anesthesia complications.   Denies Personal Hx of Anesthesia complications.   Social:  Non-Smoker    Hematology/Oncology:     Oncology Normal    -- Anemia: Hematology Comments: Received packed cells last night    EENT/Dental:EENT/Dental Normal   Cardiovascular:   Hypertension PVD On Plavix   Pulmonary:  Pulmonary Normal    Renal/:  Renal/ Normal     Hepatic/GI:  Hepatic/GI Normal GI bleed   Musculoskeletal:  Musculoskeletal Normal     Neurological:  Neurology Normal    Endocrine:  Endocrine Normal    Dermatological:  Skin Normal    Psych:  Psychiatric Normal           Physical Exam  General:  Well nourished    Airway/Jaw/Neck:  Airway Findings: Mouth Opening: Normal Mallampati: I  Jaw/Neck Findings:  Neck ROM: Normal ROM      Dental:  Dental Findings: Edentulous, Upper Dentures, Lower Dentures   Chest/Lungs:  Chest/Lungs Findings: Clear to auscultation, Normal Respiratory Rate     Heart/Vascular:  Heart Findings: Rate: Normal  Rhythm: Regular Rhythm        Mental Status:  Mental Status Findings:  Cooperative, Alert and Oriented         Anesthesia Plan  Type of Anesthesia, risks & benefits discussed:  Anesthesia Type:  general  Patient's Preference:   Intra-op Monitoring Plan: standard ASA monitors  Intra-op Monitoring Plan Comments:   Post Op Pain Control Plan: multimodal analgesia, IV/PO Opioids PRN and per primary service following discharge from PACU  Post Op Pain Control Plan Comments:   Induction:   IV  Beta Blocker:         Informed Consent: Patient understands risks and agrees with Anesthesia plan.  Questions answered. Anesthesia consent signed with patient.  ASA Score: 3     Day of Surgery Review of History & Physical:    H&P update referred to the provider.         Ready For Surgery From Anesthesia Perspective.

## 2019-04-30 NOTE — ASSESSMENT & PLAN NOTE
- To the OR tomorrow for Open Jejunostomy placement with Dr. Dowell.   - Consent obtained.   - NPO at [place].   - Please call if any questions, thank you.

## 2019-04-30 NOTE — PROGRESS NOTES
[facility]  General Surgery  Progress Note    Subjective:     History of Present Illness:  Brooks Canas is a 69 y.o. male with a h/o PAD (s/p Right fem-pop bypass with SVG in 2016; on Plavix), BPH, CKD stage III, HTN, hyperuricemia, and tobacco abuse who presents as a transfer from OSH for hematemesis.  He reports having dysphagia to solids that has progressively worsened over time.  He denies weight loss, appetite changes, fever, chills, nausea, vomiting, chest pain, shortness of breath.  He has been diagnosed with esophageal cancer this admission.  He is in need of enteral access for feeding.  He last took his Plavix 3 days ago.  His abdominal surgical history is significant for open appendectomy.    Post-Op Info:  Procedure(s) (LRB):  EGD (ESOPHAGOGASTRODUODENOSCOPY) (Left)   3 Days Post-Op     Interval History:     NAEO. Patient to the OR tomorrow for Open Jejunostomy placement.     Medications:  Continuous Infusions:   lactated ringers 100 mL/hr (04/30/19 0914)    pantoprazole 40 mg in dextrose 5 % 100 mL infusion (ready to mix system) 8 mg/hr (04/30/19 0437)     Scheduled Meds:   BESIFLOXACIN HCL 0.6% EYE DROPS (patient's own med)  1 drop Right Eye TID    DUREZOL (DIFLUPREDNATE 0.05%) EYE DROPS (patient's own med)  1 drop Right Eye BID    ILEVO (NEPAFENAC 0.3%) EYE DROPS (patient's own med)  1 drop Right Eye Daily     PRN Meds:sodium chloride, acetaminophen, HYDROmorphone, ondansetron, ondansetron, oxyCODONE, sodium chloride 0.9%, sodium chloride 0.9%     Review of patient's allergies indicates:  No Known Allergies  Objective:     Vital Signs (Most Recent):  Temp: 98.3 °F (36.8 °C) (04/30/19 0723)  Pulse: 67 (04/30/19 0723)  Resp: 17 (04/30/19 0723)  BP: (!) 146/70 (04/30/19 0723)  SpO2: 98 % (04/30/19 0723) Vital Signs (24h Range):  Temp:  [98.2 °F (36.8 °C)-98.6 °F (37 °C)] 98.3 °F (36.8 °C)  Pulse:  [61-82] 67  Resp:  [17-18] 17  SpO2:  [96 %-100 %] 98 %  BP:  (121-148)/(58-72) 146/70     Weight: 81 kg (178 lb 9.2 oz)  Body mass index is 25.62 kg/m².    Intake/Output - Last 3 Shifts       04/28 0700 - 04/29 0659 04/29 0700 - 04/30 0659 04/30 0700 - 05/01 0659    P.O.  240     I.V. (mL/kg) 1320 (16.4) 3038.3 (37.6)     Total Intake(mL/kg) 1320 (16.4) 3278.3 (40.6)     Urine (mL/kg/hr) 525 (0.3) 1410 (0.7)     Stool  0     Total Output 525 1410     Net +795 +1868.3            Urine Occurrence 4 x      Stool Occurrence 1 x 0 x 0 x          Physical Exam     Constitutional: He is oriented to person, place, and time. He appears well-developed and well-nourished. No distress.   HENT:   Head: Normocephalic and atraumatic.   Eyes: Conjunctivae and EOM are normal. No scleral icterus.   Neck: Normal range of motion. Neck supple.   Cardiovascular: Normal rate and regular rhythm.   Pulmonary/Chest: Effort normal. No respiratory distress.   Abdominal: Soft. He exhibits no distension. There is no tenderness.   Musculoskeletal: Normal range of motion. He exhibits no edema.   Neurological: He is alert and oriented to person, place, and time.   Nursing note and vitals reviewed.      Significant Labs:    CBC:   Recent Labs   Lab 04/30/19  0350   WBC 3.44*   RBC 2.69*   HGB 7.7*   HCT 23.8*   *   MCV 89   MCH 28.6   MCHC 32.4     CMP:   Recent Labs   Lab 04/30/19  0350   GLU 98   CALCIUM 8.5*   ALBUMIN 2.9*   PROT 5.1*      K 4.2   CO2 23      BUN 17   CREATININE 1.3   ALKPHOS 71   ALT 11   AST 14   BILITOT 0.6           Assessment/Plan:     Esophageal mass  - To the OR tomorrow for Open Jejunostomy placement with Dr. Dowell.   - Consent obtained.   - NPO at MN.   - Please call if any questions, thank you.         Liliana Ramirez MD  General Surgery PGY V  Beeper: [phone]

## 2019-04-30 NOTE — CONSULTS
Consult Note    Consults  SUBJECTIVE:     History of Present Illness:  This is a 68-year-old male with a past medical history of PAD status post right femoral popliteal bypass, CKD stage 3, significant tobacco use presented to ED for dysphagia and hematemesis.    Patient reports dysphagia mainly with solids for the last 2 weeks however he was able to tolerate liquids.  He denies any odynophagia but reports to hematemesis and nausea.  He also complains of abdominal fullness but denied of any abdominal pain, diarrhea, chest pain, headaches, weight loss, shortness of breath, palpitations or leg pain. EGD performed on April 27, 2019 revealed a large fungating mass in the lower 3rd of the esophagus at the GE junction and the mass is partially obstructing and partially circumferential which was biopsied.  Advanced endoscopic services is consulted for further evaluation.  General surgery consulted for tube feed placement.  Thoracic surgery on board.  CT chest abdomen and pelvis did not reveal metastatic spread    Hematology/oncology consult for for further evaluation      Review of patient's allergies indicates:  No Known Allergies  Past Medical History:   Diagnosis Date    BPH (benign prostatic hyperplasia)     Chronic kidney disease, stage III (moderate) 3/1/2016    12/10/2015: BUN/crea: 32/1.63. CrCl 43.    Claudication in peripheral vascular disease     History of tobacco use 11/4/2016    1970: Began smoking.   3/1/2016: Quit smoking.    Hypertension, benign 3/1/2016    2012: Diagnosed.    Hyperuricemia     Leg swelling 3/14/2016    3/4/2016: Began having right leg edema.    PAD (peripheral artery disease)     Peripheral artery disease 3/1/2016    2014: Began experience bilateral calf claudication.  1/2016: [facility]: Arterial Duplex: Right: SFA: distal severe. RAVI 0.78. Left: SFA: mid occlusion. RAVI 0.65. 2/1/2016: Began experience pain in right great toe.    Tobacco use 3/1/2016    1970: Began smoking. Unable  to quit.    Vitamin D deficiency disease      Past Surgical History:   Procedure Laterality Date    ANGIOGRAM-EXTREMITY-LOWER Bilateral 3/2/2016    Performed by Phoenix Ayers MD at [facility] CATH LAB    AORTOGRAM WITH RUNOFF Bilateral 5/4/2017    Performed by Phoenix Ayers MD at [facility] CATH LAB    QLNCDZ-IRICRTH-IDQUMHBSQ Right 3/4/2016    Performed by Aris Finney Jr., MD at [facility] OR    EGD (ESOPHAGOGASTRODUODENOSCOPY) Left 4/27/2019    Performed by Carine Le MD at [facility] ENDO    FEMORAL BYPASS Right 2016     History reviewed. No pertinent family history.  Social History     Tobacco Use    Smoking status: Former Smoker     Packs/day: 0.75     Years: 46.00     Pack years: 34.50     Start date: 1/1/1970     Last attempt to quit: 3/1/2016     Years since quitting: 3.1    Smokeless tobacco: Never Used   Substance Use Topics    Alcohol use: No     Alcohol/week: 0.0 oz    Drug use: Not on file     Review of Systems   Constitutional: Positive for malaise/fatigue. Negative for chills and fever.   HENT: Negative for nosebleeds.    Respiratory: Negative for shortness of breath.    Cardiovascular: Negative for chest pain.   Gastrointestinal: Positive for melena and nausea. Negative for abdominal pain and diarrhea.        Hematemesis   Genitourinary: Negative for frequency, hematuria and urgency.   Musculoskeletal: Negative for back pain.   Neurological: Negative for speech change, weakness and headaches.     OBJECTIVE:     Vital Signs:  Temp:  [98.3 °F (36.8 °C)-98.6 °F (37 °C)]   Pulse:  [61-73]   Resp:  [18]   BP: (142-148)/(65-72)   SpO2:  [97 %-100 %]     Physical Exam   Constitutional: He is oriented to person, place, and time. He appears well-developed and well-nourished.   HENT:   Head: Normocephalic and atraumatic.   Eyes: Pupils are equal, round, and reactive to light. EOM are normal.   Neck: Normal range of motion. Neck supple.   Cardiovascular: Normal rate and regular rhythm.   No murmur  heard.  Pulmonary/Chest: Effort normal and breath sounds normal. No respiratory distress.   Abdominal: Soft. Bowel sounds are normal. He exhibits no distension.   Musculoskeletal: Normal range of motion. He exhibits no edema or deformity.   Neurological: He is alert and oriented to person, place, and time.   Skin: Skin is warm and dry.     Laboratory:  CBC:   Recent Labs   Lab 04/29/19  1538   WBC 4.61   RBC 3.03*   HGB 8.4*   HCT 26.0*   *   MCV 86   MCH 27.7   MCHC 32.3     CMP:   Recent Labs   Lab 04/29/19  0352   GLU 96   CALCIUM 8.5*   ALBUMIN 2.8*   PROT 4.9*      K 3.9   CO2 18*      BUN 26*   CREATININE 1.3   ALKPHOS 67   ALT 8*   AST 14   BILITOT 0.5     Coagulation:   Recent Labs   Lab 04/26/19  2122   LABPROT 10.9   INR 1.0   APTT 25.7     Specimen (12h ago, onward)    None            Diagnostic Results:    CT chest abdomen and pelvis with contrast  1.  Abnormal asymmetric wall thickening involving the left lateral aspect of the distal thoracic esophagus suspicious for underlying mass/malignancy as reportedly seen on prior endoscopic exam.    2.  Right upper lobe pulmonary micronodule.  For a solid nodule <6 mm, Fleischner Society 2017 guidelines recommend no routine follow up for a low risk patient, or follow-up with non-contrast chest CT at 12 months in a high risk patient.    3.  Right hepatic lobe cyst.  Additional subcentimeter hepatic hypodensities which are too small to accurately characterize.    4.  Subcentimeter right renal hypodensity, too small to accurately characterize.  Probable diverticulum arising from the left posterolateral bladder wall.    5.  2.0 cm hypoattenuating right thyroid lobe nodule.  Nonemergent thyroid ultrasound follow-up advised if not previously performed.    6.  Mildly dilated infrarenal abdominal aorta with prominent atherosclerotic plaque along its course.    7.  Diverticulosis without evidence of acute diverticulitis.    EGD  A large, fungating mass  with no bleeding and stigmata of recent        bleeding was found in the lower third of the esophagus and at the        gastroesophageal junction, 32 cm from the incisors. The mass was        partially obstructing and partially circumferential (involving        one-half of the lumen circumference). Biopsies were taken with a        cold forceps for histology. Verification of patient identification        for the specimen was done by the physician and nurse using the        patient's name and medical record number. Estimated blood loss: 10        mL.       Hematin (altered blood/coffee-ground-like material) was found in the        entire examined stomach.       The examined duodenum was normal.  Impression:           - Partially obstructing, malignant esophageal tumor                         was found in the lower third of the esophagus and                         in the cardia. Biopsied.                           ASSESSMENT/PLAN:     1.  Large fungating mass in the lower 3rd of esophagus highly suspicious for esophageal carcinoma.  EGD done with mass biopsied and sent for pathology.   2.  Acute upper GI bleed, aspirin and Plavix on hold  3.  CKD stage 3  4.  Hypertension  5.  Peripheral artery disease    Plan:   1.  Await final pathology results  2.  Patient will likely need neoadjuvant chemotherapy prior to surgery  3.  Recommend to keep hemoglobin over 8    Plan of care discussed with Dr. Debbie Stoner MD PGY 4  Hematology/oncology        I have reviewed the notes, assessments, and/or procedures performed by the housestaff, as above.  I have personally interviewed and examined the patient at the beside, and rounded with the housestaff. I concur with her/his assessment and plan and the documentation of Brooks Canas.  I, Dr. Yuan Lewis, personally spent more than 70 mins during this encounter, greater than 50% was spent in direct counseling and/or coordination of care.     Yuan Lewis,  M.D., M.S., F.ELIZABETH.  Hematology/Oncology Attending  [facility]

## 2019-05-01 ENCOUNTER — HOSPITAL ENCOUNTER (OUTPATIENT)
Dept: RADIATION THERAPY | Facility: HOSPITAL | Age: 69
Discharge: HOME | End: 2019-05-01
Attending: RADIOLOGY
Payer: MEDICARE

## 2019-05-01 ENCOUNTER — ANESTHESIA (OUTPATIENT)
Dept: SURGERY | Facility: HOSPITAL | Age: 69
DRG: 375 | End: 2019-05-01
Payer: MEDICARE

## 2019-05-01 LAB
ALBUMIN SERPL BCP-MCNC: 3.1 G/DL (ref 3.5–5.2)
ALP SERPL-CCNC: 75 U/L (ref 55–135)
ALT SERPL W/O P-5'-P-CCNC: 9 U/L (ref 10–44)
ANION GAP SERPL CALC-SCNC: 9 MMOL/L (ref 8–16)
AST SERPL-CCNC: 13 U/L (ref 10–40)
BASOPHILS # BLD AUTO: 0.01 K/UL (ref 0–0.2)
BASOPHILS # BLD AUTO: 0.03 K/UL (ref 0–0.2)
BASOPHILS NFR BLD: 0.1 % (ref 0–1.9)
BASOPHILS NFR BLD: 0.9 % (ref 0–1.9)
BILIRUB SERPL-MCNC: 0.6 MG/DL (ref 0.1–1)
BUN SERPL-MCNC: 11 MG/DL (ref 8–23)
CALCIUM SERPL-MCNC: 8.6 MG/DL (ref 8.7–10.5)
CHLORIDE SERPL-SCNC: 110 MMOL/L (ref 95–110)
CO2 SERPL-SCNC: 24 MMOL/L (ref 23–29)
CREAT SERPL-MCNC: 1.3 MG/DL (ref 0.5–1.4)
DIFFERENTIAL METHOD: ABNORMAL
DIFFERENTIAL METHOD: ABNORMAL
EOSINOPHIL # BLD AUTO: 0 K/UL (ref 0–0.5)
EOSINOPHIL # BLD AUTO: 0.1 K/UL (ref 0–0.5)
EOSINOPHIL NFR BLD: 0 % (ref 0–8)
EOSINOPHIL NFR BLD: 3.6 % (ref 0–8)
ERYTHROCYTE [DISTWIDTH] IN BLOOD BY AUTOMATED COUNT: 15.7 % (ref 11.5–14.5)
ERYTHROCYTE [DISTWIDTH] IN BLOOD BY AUTOMATED COUNT: 15.7 % (ref 11.5–14.5)
EST. GFR  (AFRICAN AMERICAN): >60 ML/MIN/1.73 M^2
EST. GFR  (NON AFRICAN AMERICAN): 54.9 ML/MIN/1.73 M^2
GLUCOSE SERPL-MCNC: 106 MG/DL (ref 70–110)
HCT VFR BLD AUTO: 24.9 % (ref 40–54)
HCT VFR BLD AUTO: 26.5 % (ref 40–54)
HGB BLD-MCNC: 8.1 G/DL (ref 14–18)
HGB BLD-MCNC: 8.6 G/DL (ref 14–18)
IMM GRANULOCYTES # BLD AUTO: 0.01 K/UL (ref 0–0.04)
IMM GRANULOCYTES # BLD AUTO: 0.04 K/UL (ref 0–0.04)
IMM GRANULOCYTES NFR BLD AUTO: 0.3 % (ref 0–0.5)
IMM GRANULOCYTES NFR BLD AUTO: 0.4 % (ref 0–0.5)
LYMPHOCYTES # BLD AUTO: 0.4 K/UL (ref 1–4.8)
LYMPHOCYTES # BLD AUTO: 0.7 K/UL (ref 1–4.8)
LYMPHOCYTES NFR BLD: 20.3 % (ref 18–48)
LYMPHOCYTES NFR BLD: 4 % (ref 18–48)
MAGNESIUM SERPL-MCNC: 1.6 MG/DL (ref 1.6–2.6)
MCH RBC QN AUTO: 28.7 PG (ref 27–31)
MCH RBC QN AUTO: 28.7 PG (ref 27–31)
MCHC RBC AUTO-ENTMCNC: 32.5 G/DL (ref 32–36)
MCHC RBC AUTO-ENTMCNC: 32.5 G/DL (ref 32–36)
MCV RBC AUTO: 88 FL (ref 82–98)
MCV RBC AUTO: 88 FL (ref 82–98)
MONOCYTES # BLD AUTO: 0.4 K/UL (ref 0.3–1)
MONOCYTES # BLD AUTO: 0.5 K/UL (ref 0.3–1)
MONOCYTES NFR BLD: 12.1 % (ref 4–15)
MONOCYTES NFR BLD: 5.6 % (ref 4–15)
NEUTROPHILS # BLD AUTO: 2.1 K/UL (ref 1.8–7.7)
NEUTROPHILS # BLD AUTO: 8.1 K/UL (ref 1.8–7.7)
NEUTROPHILS NFR BLD: 62.8 % (ref 38–73)
NEUTROPHILS NFR BLD: 89.9 % (ref 38–73)
NRBC BLD-RTO: 0 /100 WBC
NRBC BLD-RTO: 0 /100 WBC
PLATELET # BLD AUTO: 137 K/UL (ref 150–350)
PLATELET # BLD AUTO: 143 K/UL (ref 150–350)
PMV BLD AUTO: 10 FL (ref 9.2–12.9)
PMV BLD AUTO: 10.9 FL (ref 9.2–12.9)
POTASSIUM SERPL-SCNC: 3.9 MMOL/L (ref 3.5–5.1)
PROT SERPL-MCNC: 5.5 G/DL (ref 6–8.4)
RBC # BLD AUTO: 2.82 M/UL (ref 4.6–6.2)
RBC # BLD AUTO: 3 M/UL (ref 4.6–6.2)
SODIUM SERPL-SCNC: 143 MMOL/L (ref 136–145)
WBC # BLD AUTO: 3.3 K/UL (ref 3.9–12.7)
WBC # BLD AUTO: 9.03 K/UL (ref 3.9–12.7)

## 2019-05-01 PROCEDURE — D9220A PRA ANESTHESIA: Mod: CRNA,,, | Performed by: NURSE ANESTHETIST, CERTIFIED REGISTERED

## 2019-05-01 PROCEDURE — 63600175 PHARM REV CODE 636 W HCPCS: Performed by: STUDENT IN AN ORGANIZED HEALTH CARE EDUCATION/TRAINING PROGRAM

## 2019-05-01 PROCEDURE — 37000009 HC ANESTHESIA EA ADD 15 MINS: Performed by: SURGERY

## 2019-05-01 PROCEDURE — 99233 SBSQ HOSP IP/OBS HIGH 50: CPT | Mod: ,,, | Performed by: HOSPITALIST

## 2019-05-01 PROCEDURE — 63600175 PHARM REV CODE 636 W HCPCS: Performed by: NURSE ANESTHETIST, CERTIFIED REGISTERED

## 2019-05-01 PROCEDURE — 25000003 PHARM REV CODE 250: Performed by: NURSE ANESTHETIST, CERTIFIED REGISTERED

## 2019-05-01 PROCEDURE — 85025 COMPLETE CBC W/AUTO DIFF WBC: CPT | Mod: 91

## 2019-05-01 PROCEDURE — 80053 COMPREHEN METABOLIC PANEL: CPT

## 2019-05-01 PROCEDURE — 36000707: Performed by: SURGERY

## 2019-05-01 PROCEDURE — 36000706: Performed by: SURGERY

## 2019-05-01 PROCEDURE — 25000003 PHARM REV CODE 250: Performed by: STUDENT IN AN ORGANIZED HEALTH CARE EDUCATION/TRAINING PROGRAM

## 2019-05-01 PROCEDURE — 25000003 PHARM REV CODE 250: Performed by: NURSE PRACTITIONER

## 2019-05-01 PROCEDURE — 71000039 HC RECOVERY, EACH ADD'L HOUR: Performed by: SURGERY

## 2019-05-01 PROCEDURE — 37000008 HC ANESTHESIA 1ST 15 MINUTES: Performed by: SURGERY

## 2019-05-01 PROCEDURE — 25000003 PHARM REV CODE 250

## 2019-05-01 PROCEDURE — D9220A PRA ANESTHESIA: Mod: ANES,,, | Performed by: ANESTHESIOLOGY

## 2019-05-01 PROCEDURE — 99233 PR SUBSEQUENT HOSPITAL CARE,LEVL III: ICD-10-PCS | Mod: ,,, | Performed by: HOSPITALIST

## 2019-05-01 PROCEDURE — 20600001 HC STEP DOWN PRIVATE ROOM

## 2019-05-01 PROCEDURE — D9220A PRA ANESTHESIA: ICD-10-PCS | Mod: CRNA,,, | Performed by: NURSE ANESTHETIST, CERTIFIED REGISTERED

## 2019-05-01 PROCEDURE — 36415 COLL VENOUS BLD VENIPUNCTURE: CPT

## 2019-05-01 PROCEDURE — 25000003 PHARM REV CODE 250: Performed by: ANESTHESIOLOGY

## 2019-05-01 PROCEDURE — 83735 ASSAY OF MAGNESIUM: CPT

## 2019-05-01 PROCEDURE — 94761 N-INVAS EAR/PLS OXIMETRY MLT: CPT

## 2019-05-01 PROCEDURE — D9220A PRA ANESTHESIA: ICD-10-PCS | Mod: ANES,,, | Performed by: ANESTHESIOLOGY

## 2019-05-01 PROCEDURE — 44300 OPEN BOWEL TO SKIN: CPT | Mod: ,,, | Performed by: SURGERY

## 2019-05-01 PROCEDURE — 44300 PR PLACEMENT ENTEROSTOMY/CECOSTOMY TUBE OPEN: ICD-10-PCS | Mod: ,,, | Performed by: SURGERY

## 2019-05-01 PROCEDURE — 71000033 HC RECOVERY, INTIAL HOUR: Performed by: SURGERY

## 2019-05-01 PROCEDURE — 63600175 PHARM REV CODE 636 W HCPCS: Performed by: ANESTHESIOLOGY

## 2019-05-01 RX ORDER — DEXAMETHASONE SODIUM PHOSPHATE 4 MG/ML
INJECTION, SOLUTION INTRA-ARTICULAR; INTRALESIONAL; INTRAMUSCULAR; INTRAVENOUS; SOFT TISSUE
Status: DISCONTINUED | OUTPATIENT
Start: 2019-05-01 | End: 2019-05-01

## 2019-05-01 RX ORDER — OXYCODONE AND ACETAMINOPHEN 5; 325 MG/1; MG/1
1 TABLET ORAL EVERY 4 HOURS PRN
Status: DISCONTINUED | OUTPATIENT
Start: 2019-05-01 | End: 2019-05-07 | Stop reason: HOSPADM

## 2019-05-01 RX ORDER — SODIUM CHLORIDE 0.9 % (FLUSH) 0.9 %
10 SYRINGE (ML) INJECTION
Status: DISCONTINUED | OUTPATIENT
Start: 2019-05-01 | End: 2019-05-07 | Stop reason: HOSPADM

## 2019-05-01 RX ORDER — ACETAMINOPHEN 10 MG/ML
INJECTION, SOLUTION INTRAVENOUS
Status: DISCONTINUED | OUTPATIENT
Start: 2019-05-01 | End: 2019-05-01

## 2019-05-01 RX ORDER — FENTANYL CITRATE 50 UG/ML
INJECTION, SOLUTION INTRAMUSCULAR; INTRAVENOUS
Status: DISCONTINUED | OUTPATIENT
Start: 2019-05-01 | End: 2019-05-01

## 2019-05-01 RX ORDER — MIDAZOLAM HYDROCHLORIDE 1 MG/ML
INJECTION, SOLUTION INTRAMUSCULAR; INTRAVENOUS
Status: DISCONTINUED | OUTPATIENT
Start: 2019-05-01 | End: 2019-05-01

## 2019-05-01 RX ORDER — ONDANSETRON 2 MG/ML
INJECTION INTRAMUSCULAR; INTRAVENOUS
Status: DISCONTINUED | OUTPATIENT
Start: 2019-05-01 | End: 2019-05-01

## 2019-05-01 RX ORDER — SUCCINYLCHOLINE CHLORIDE 20 MG/ML
INJECTION INTRAMUSCULAR; INTRAVENOUS
Status: DISCONTINUED | OUTPATIENT
Start: 2019-05-01 | End: 2019-05-01

## 2019-05-01 RX ORDER — FENTANYL CITRATE 50 UG/ML
25 INJECTION, SOLUTION INTRAMUSCULAR; INTRAVENOUS EVERY 5 MIN PRN
Status: DISCONTINUED | OUTPATIENT
Start: 2019-05-01 | End: 2019-05-01 | Stop reason: HOSPADM

## 2019-05-01 RX ORDER — KETAMINE HCL IN 0.9 % NACL 50 MG/5 ML
SYRINGE (ML) INTRAVENOUS
Status: DISCONTINUED | OUTPATIENT
Start: 2019-05-01 | End: 2019-05-01

## 2019-05-01 RX ORDER — PROPOFOL 10 MG/ML
VIAL (ML) INTRAVENOUS
Status: DISCONTINUED | OUTPATIENT
Start: 2019-05-01 | End: 2019-05-01

## 2019-05-01 RX ORDER — OXYCODONE AND ACETAMINOPHEN 10; 325 MG/1; MG/1
TABLET ORAL
Status: COMPLETED
Start: 2019-05-01 | End: 2019-05-01

## 2019-05-01 RX ORDER — ONDANSETRON 2 MG/ML
4 INJECTION INTRAMUSCULAR; INTRAVENOUS DAILY PRN
Status: DISCONTINUED | OUTPATIENT
Start: 2019-05-01 | End: 2019-05-01 | Stop reason: HOSPADM

## 2019-05-01 RX ORDER — LIDOCAINE HCL/PF 100 MG/5ML
SYRINGE (ML) INTRAVENOUS
Status: DISCONTINUED | OUTPATIENT
Start: 2019-05-01 | End: 2019-05-01

## 2019-05-01 RX ORDER — ROCURONIUM BROMIDE 10 MG/ML
INJECTION, SOLUTION INTRAVENOUS
Status: DISCONTINUED | OUTPATIENT
Start: 2019-05-01 | End: 2019-05-01

## 2019-05-01 RX ORDER — HYDROMORPHONE HYDROCHLORIDE 1 MG/ML
INJECTION, SOLUTION INTRAMUSCULAR; INTRAVENOUS; SUBCUTANEOUS
Status: COMPLETED
Start: 2019-05-01 | End: 2019-05-01

## 2019-05-01 RX ORDER — OXYCODONE AND ACETAMINOPHEN 10; 325 MG/1; MG/1
1 TABLET ORAL EVERY 4 HOURS PRN
Status: DISCONTINUED | OUTPATIENT
Start: 2019-05-01 | End: 2019-05-07 | Stop reason: HOSPADM

## 2019-05-01 RX ADMIN — PROPOFOL 180 MG: 10 INJECTION, EMULSION INTRAVENOUS at 09:05

## 2019-05-01 RX ADMIN — SODIUM CHLORIDE 200 ML: 0.9 INJECTION, SOLUTION INTRAVENOUS at 10:05

## 2019-05-01 RX ADMIN — OXYCODONE HYDROCHLORIDE AND ACETAMINOPHEN 1 TABLET: 10; 325 TABLET ORAL at 12:05

## 2019-05-01 RX ADMIN — MIDAZOLAM HYDROCHLORIDE 2 MG: 1 INJECTION, SOLUTION INTRAMUSCULAR; INTRAVENOUS at 09:05

## 2019-05-01 RX ADMIN — PANTOPRAZOLE SODIUM 8 MG/HR: 40 INJECTION, POWDER, FOR SOLUTION INTRAVENOUS at 04:05

## 2019-05-01 RX ADMIN — ACETAMINOPHEN 1000 MG: 10 INJECTION, SOLUTION INTRAVENOUS at 10:05

## 2019-05-01 RX ADMIN — SUCCINYLCHOLINE CHLORIDE 120 MG: 20 INJECTION, SOLUTION INTRAMUSCULAR; INTRAVENOUS at 09:05

## 2019-05-01 RX ADMIN — HYDROMORPHONE HYDROCHLORIDE 0.4 MG: 2 INJECTION, SOLUTION INTRAMUSCULAR; INTRAVENOUS; SUBCUTANEOUS at 11:05

## 2019-05-01 RX ADMIN — SODIUM CHLORIDE: 0.9 INJECTION, SOLUTION INTRAVENOUS at 09:05

## 2019-05-01 RX ADMIN — ONDANSETRON 4 MG: 2 SOLUTION INTRAMUSCULAR; INTRAVENOUS at 11:05

## 2019-05-01 RX ADMIN — FENTANYL CITRATE 100 MCG: 50 INJECTION, SOLUTION INTRAMUSCULAR; INTRAVENOUS at 09:05

## 2019-05-01 RX ADMIN — ROCURONIUM BROMIDE 20 MG: 10 INJECTION, SOLUTION INTRAVENOUS at 10:05

## 2019-05-01 RX ADMIN — OXYCODONE HYDROCHLORIDE AND ACETAMINOPHEN 1 TABLET: 10; 325 TABLET ORAL at 04:05

## 2019-05-01 RX ADMIN — PANTOPRAZOLE SODIUM 8 MG/HR: 40 INJECTION, POWDER, FOR SOLUTION INTRAVENOUS at 11:05

## 2019-05-01 RX ADMIN — ONDANSETRON 4 MG: 2 INJECTION INTRAMUSCULAR; INTRAVENOUS at 10:05

## 2019-05-01 RX ADMIN — PROMETHAZINE HYDROCHLORIDE 6.25 MG: 25 INJECTION INTRAMUSCULAR; INTRAVENOUS at 11:05

## 2019-05-01 RX ADMIN — PANTOPRAZOLE SODIUM 8 MG/HR: 40 INJECTION, POWDER, FOR SOLUTION INTRAVENOUS at 06:05

## 2019-05-01 RX ADMIN — DEXAMETHASONE SODIUM PHOSPHATE 4 MG: 4 INJECTION, SOLUTION INTRAMUSCULAR; INTRAVENOUS at 10:05

## 2019-05-01 RX ADMIN — LIDOCAINE HYDROCHLORIDE 100 MG: 20 INJECTION, SOLUTION INTRAVENOUS at 09:05

## 2019-05-01 RX ADMIN — PANTOPRAZOLE SODIUM 8 MG/HR: 40 INJECTION, POWDER, FOR SOLUTION INTRAVENOUS at 01:05

## 2019-05-01 RX ADMIN — ACETAMINOPHEN 650 MG: 325 TABLET ORAL at 06:05

## 2019-05-01 RX ADMIN — ROCURONIUM BROMIDE 5 MG: 10 INJECTION, SOLUTION INTRAVENOUS at 09:05

## 2019-05-01 NOTE — TRANSFER OF CARE
"Anesthesia Transfer of Care Note    Patient: Brooks Canas    Procedure(s) Performed: Procedure(s) (LRB):  INSERTION, JEJUNOSTOMY TUBE (N/A)    Patient location: PACU    Anesthesia Type: general    Transport from OR: Transported from OR on room air with adequate spontaneous ventilation    Post pain: adequate analgesia    Post assessment: no apparent anesthetic complications    Post vital signs: stable    Level of consciousness: awake    Nausea/Vomiting: no vomiting    Complications: none    Transfer of care protocol was followed      Last vitals:   Visit Vitals  BP (!) 159/73   Pulse 66   Temp 37 °C (98.6 °F) (Oral)   Resp 16   Ht 5' 10" (1.778 m)   Wt 81 kg (178 lb 9.2 oz)   SpO2 96%   BMI 25.62 kg/m²     "

## 2019-05-01 NOTE — PROGRESS NOTES
Hospital Medicine  Progress note    Team: [facility] R Griffin Garner MD  Admit Date: 4/26/2019  OLIVIA 5/3/2019  Length of Stay:  LOS: 4 days   Code status: Full Code    Principal Problem:  Acute upper GI bleed    Overview    Interval hx:  Patient seen and examined at bedside, no acute events overnight. Open Jejunostomy placement scheduled for tomorrow with Surgery.         ROS     Constitutional: Negative for fever.   Respiratory: Negative for shortness of breath.    Cardiovascular: Negative for chest pain, palpitations and leg swelling.   Gastrointestinal: Negative for abdominal pain, blood in stool, constipation, diarrhea, nausea and vomiting.   Neurological: Negative for dizziness and syncope.   Psychiatric/Behavioral: Negative for agitation, behavioral problems and confusion.          PEx  Temp:  [98 °F (36.7 °C)-98.8 °F (37.1 °C)]   Pulse:  [57-82]   Resp:  [17-18]   BP: (121-160)/(58-72)   SpO2:  [96 %-100 %]     Intake/Output Summary (Last 24 hours) at 4/30/2019 2204  Last data filed at 4/30/2019 1743  Gross per 24 hour   Intake 4314.67 ml   Output 1660 ml   Net 2654.67 ml     Constitutional: He is oriented to person, place, and time. He appears well-developed and well-nourished. No distress.   HENT:   Head: Normocephalic and atraumatic.   Eyes: Conjunctivae and EOM are normal. No scleral icterus.   Neck: Normal range of motion.   Pulmonary/Chest: Effort normal and breath sounds normal. No respiratory distress.   Abdominal: Soft. Bowel sounds are normal. He exhibits no distension. There is no tenderness. There is no rebound and no guarding.   Musculoskeletal: Normal range of motion.   Neurological: He is alert and oriented to person, place, and time.   Skin: Skin is warm and dry. No rash noted. He is not diaphoretic. No erythema. No pallor.   Psychiatric: He has a normal mood and affect. His behavior is normal. Judgment and thought content normal.         Recent Labs   Lab 04/29/19  1538 04/30/19  0350  04/30/19  1648   WBC 4.61 3.44* 3.51*   HGB 8.4* 7.7* 8.3*   HCT 26.0* 23.8* 26.3*   * 124* 139*     Recent Labs   Lab 04/26/19 2122 04/27/19 0621 04/28/19 0326 04/29/19 0352 04/30/19  0350     --  140 140 139 141   K 4.5  --  4.9 3.7 3.9 4.2     --  115* 115* 110 109   CO2 22*  --  18* 19* 18* 23   BUN 25*  --  47* 45* 26* 17   CREATININE 1.5*  --  1.3 1.3 1.3 1.3   *  --  118* 88 96 98   CALCIUM 8.8  --  8.3* 8.0* 8.5* 8.5*   MG  --    < > 1.5* 1.7 1.4* 1.5*   PHOS  --   --  1.8*  --   --   --    LIPASE 76*  --   --   --   --   --     < > = values in this interval not displayed.     Recent Labs   Lab 04/26/19 2122 04/28/19 0326 04/29/19 0352 04/30/19  0350   ALKPHOS 82   < > 54* 67 71   ALT 9*   < > 5* 8* 11   AST 11   < > 11 14 14   ALBUMIN 3.0*   < > 2.6* 2.8* 2.9*   PROT 5.7*   < > 4.5* 4.9* 5.1*   BILITOT 0.5   < > 0.3 0.5 0.6   INR 1.0  --   --   --   --     < > = values in this interval not displayed.        No results for input(s): CPK, CPKMB, MB, TROPONINI in the last 72 hours.  No results for input(s): POCTGLUCOSE in the last 168 hours.  No results found for: HGBA1C    Scheduled Meds:   BESIFLOXACIN HCL 0.6% EYE DROPS (patient's own med)  1 drop Right Eye TID    DUREZOL (DIFLUPREDNATE 0.05%) EYE DROPS (patient's own med)  1 drop Right Eye BID    ILEVO (NEPAFENAC 0.3%) EYE DROPS (patient's own med)  1 drop Right Eye Daily     Continuous Infusions:   lactated ringers 100 mL/hr (04/30/19 5853)    pantoprazole 40 mg in dextrose 5 % 100 mL infusion (ready to mix system) 8 mg/hr (04/30/19 1738)     As Needed:  sodium chloride, acetaminophen, HYDROmorphone, ondansetron, ondansetron, oxyCODONE, sodium chloride 0.9%, sodium chloride 0.9%    Active Hospital Problems    Diagnosis  POA    *Acute upper GI bleed [K92.2]  Yes    Weakness [R53.1]  Yes    Esophageal mass [K22.9]  Yes    Hypomagnesemia [E83.42]  Yes    Acute blood loss anemia [D62]  Yes    Chronic kidney  disease, stage III (moderate) [N18.3]  Yes     12/10/2015: BUN/crea: 32/1.63. CrCl 43.      Hypertension [I10]  Yes     2012: Diagnosed.        Resolved Hospital Problems   No resolved problems to display.         Assessment and Plan    Acute upper GI bleed  Admitted to ICU @[facility]. Underwent EGD which found large circumferential mass of GEJ; biopsies taken.  -He continues on PPI  -Continue H/H q12h.  Transfuse for Hb < 7.0  -Hold aspirin and plavix.  -Trial CLD, with advancement to soft diet tomorrow if remains stable.       Esophageal mass  Per endoscopy (4/27) report:          A large, fungating mass with no bleeding and stigmata of recent        bleeding was found in the lower third of the esophagus and at the        gastroesophageal junction, 32 cm from the incisors. The mass was        partially obstructing and partially circumferential (involving        one-half of the lumen circumference). Biopsies were taken with a        cold forceps for histology.     CTS consulted; appreciate assistance.   Oncology consult placed as patient will require neoadjuvant therapy prior to resection, per CTS; biopsy pathology pending.  AES consulted for EUS, recommend doing it as outpatient .        Acute blood loss anemia  -Due to GI bleeding  -Received 1 unit PRBC 4/27  -Check iron, ferritin, b12 and folate  -Repeat h/h q12h, transfuse <7 g/dl.     Hypomagnesemia  -Replete prn     Chronic kidney disease, stage III (moderate)  -back to baseline      Hypertension  -Continue to hold home oral meds in the setting of hypotension and GIB. Resume when clinically appropriate.   -Order hydralazine PRN SBP > 170      High Risk Conditions  PAD, BPH, CKD stage III, HTN, hyperuricemia      Diet: clear liquid diet, NPO after MN  GI PPx:   DVT PPx:    CHARLES/SCD    Goals of Care: Full code  Discharge plan: pending jujenostomy tube placement and possible inpatient chemotherapy    Time (minutes) spent in care of the patient (Greater than 1/2  spent in direct face-to-face contact) 35 minutes      Griffin Panchal MD  Staff Hospitalist  Department of Hospital Medicine  [facility]   [phone]

## 2019-05-01 NOTE — PROGRESS NOTES
Attempted to call report to [facility].  tried to connect to RN and said spectralink is busy. Attempted to call spetralink and is till busy.

## 2019-05-01 NOTE — PLAN OF CARE
VSS. Patient states nausea has passed. Patient states pain is tolerable at this time. Dressing intact with sanguinous drainage. Jtube intact and clamped.

## 2019-05-01 NOTE — NURSING
Patient HR of 45 noted on monitor, patient asymptomatic, VSS, denies chest pain. MD notified, informed to continue monitoring patient.

## 2019-05-01 NOTE — CARE UPDATE
General Surgery Care Update    Please keep patient NPO until 5/2 at which time he can resume a clear liquid diet. Ok for sips with meds. Do not start tube feeds until General Surgery has evaluated the patient tomorrow AM. Please flush j-tube with 30 mL of water every 6 hours (nursing order placed).     Lencho Mancera M.D.  PGY 3

## 2019-05-01 NOTE — PLAN OF CARE
Problem: Adult Inpatient Plan of Care  Goal: Plan of Care Review  Outcome: Ongoing (interventions implemented as appropriate)  Pt AAOx4, VSS, in bed with upper siderails raised x2, bed in lowest/locked position, call light/personal belongings within reach. Pt instructed to call for assistance. Pt verbalizes understanding. Pt afebrile at this time.  Proper hand hygiene performed before and after pt care.      Pt NPO after midnight for J tube placement (5/1).  LR gtt continued at 100cc/h.  Protonix gtt continued at 8mg/h.  Pt reports 1 bm so far this shift - formed, but dark/tarry. RN unable to assess. H&H stable, hemodynamically stable.  UOP measured via urinal.  Pt denies any pain/GI discomfort so far this shift.    Will continue to monitor patient.

## 2019-05-01 NOTE — OP NOTE
DATE OF PROCEDURE:  05/01/2019.    PREOPERATIVE DIAGNOSIS:  Gastroesophageal junction cancer.    POSTOPERATIVE DIAGNOSIS:  Gastroesophageal junction cancer.    PROCEDURE:  Insertion of a jejunostomy tube.    SURGEON:  Jose G Dowell M.D.    ASSISTANT:  Lencho Mancera M.D. (RES)    ANESTHESIA:  General.    BLOOD LOSS:  Minimal.    COMPLICATIONS:  None.    INDICATIONS:  A 69-year-old patient with a recently diagnosed distal esophageal   cancer obtaining neoadjuvant chemoradiation.  Jejunostomy tube has been   requested to facilitate nutritional management during treatment.    OPERATIVE REPORT IN DETAIL:  The patient was brought to the operating room and   placed in the supine position, prepped and draped in sterile fashion.  After   satisfactory general anesthesia was induced, an upper midline incision was made   from the umbilicus towards the xiphoid.  The peritoneal cavity was uneventfully   entered.  The bowel was run.  The patient had been on chronic anticoagulation on   antiplatelet therapy and a couple of small mesenteric hematomas were noted.    These were controlled with several figure-of-eight 3-0 pop-offs sutures   effectively.  The proximal bowel was clearly identified.  The ligament of Treitz   was visualized down to the foot distal to the ligament of Treitz.  A 12 mm   feeding tube was brought into the left upper quadrant.  Stab incision was then   secured in a standard Witzel fashion to the bowel.  The bowel was then secured   to the peritoneum in the left upper quadrant.  Tube was flushed and demonstrated   patency.  The midline fascia was closed with a running PDS.  Subcutaneous was   irrigated and skin was closed with clips.  The tube was secured to the skin.    Needle, sponge and instrument counts were correct.  The patient remained stable   throughout the operation.      GF/HN  dd: 05/01/2019 10:37:48 (CDT)  td: 05/01/2019 14:05:04 (CDT)  Doc ID   #[id number]  Job ID #[id number]    CC:

## 2019-05-01 NOTE — BRIEF OP NOTE
[facility]  [facility]rief [facility]    SUMMARY     Surgery Date: 5/1/2019     Surgeon(s) and Role:     * Jose G Dowell MD - Primary     * Lencho Mancera MD - Resident - Assisting      Pre-op Diagnosis:  Esophageal mass [K22.9]    Post-op Diagnosis:  Post-Op Diagnosis Codes:     * Esophageal mass [K22.9]    Procedure(s) (LRB):  INSERTION, JEJUNOSTOMY TUBE (N/A)    Anesthesia: General    Description of Procedure: open jejunostomy tube placement    Description of the findings of the procedure: 12 F j-tube placed    Estimated Blood Loss: minimal         Specimens:   Specimen (12h ago, onward)    None

## 2019-05-01 NOTE — NURSING TRANSFER
Nursing Transfer Note      5/1/2019     Transfer To: 1059    Transfer via stretcher    Transfer with cardiac monitoring    Transported by PCT    Medicines sent: MIVF, IV protonix    Chart send with patient: Yes      Patient reassessed at: 5/1/19 (date, time)

## 2019-05-01 NOTE — ANESTHESIA POSTPROCEDURE EVALUATION
Anesthesia Post Evaluation    Patient: Brooks Canas    Procedure(s) Performed: Procedure(s) (LRB):  INSERTION, JEJUNOSTOMY TUBE (N/A)    Final Anesthesia Type: general  Patient location during evaluation: PACU  Patient participation: Yes- Able to Participate  Level of consciousness: awake and alert  Post-procedure vital signs: reviewed and stable  Pain management: adequate  Airway patency: patent  PONV status at discharge: No PONV  Anesthetic complications: no      Cardiovascular status: blood pressure returned to baseline  Respiratory status: unassisted, spontaneous ventilation and room air  Hydration status: euvolemic  Follow-up not needed.          Vitals Value Taken Time   /63 5/1/2019 12:31 PM   Temp 36.4 °C (97.5 °F) 5/1/2019 12:15 PM   Pulse 61 5/1/2019 12:36 PM   Resp 10 5/1/2019 12:36 PM   SpO2 90 % 5/1/2019 12:36 PM   Vitals shown include unvalidated device data.      Event Time     Out of Recovery 05/01/2019 12:40:24          Pain/Saji Score: Pain Rating Prior to Med Admin: 6 (5/1/2019 12:14 PM)  Pain Rating Post Med Admin: 0 (5/1/2019 12:05 PM)  Saji Score: 9 (5/1/2019 12:05 PM)

## 2019-05-01 NOTE — PROGRESS NOTES
Hospital Medicine  Progress note    Team: [facility] MED R Griffin Garner MD  Admit Date: 4/26/2019  OLIVIA 5/3/2019  Length of Stay:  LOS: 5 days   Code status: Full Code    Principal Problem:  Acute upper GI bleed    Overview    Interval hx:  Patient seen and examined at bedside, no acute events overnight. Open Jejunostomy performed today. NPO Hgb stable.       ROS     Constitutional: Negative for fever.   Respiratory: Negative for shortness of breath.    Cardiovascular: Negative for chest pain, palpitations and leg swelling.   Gastrointestinal: Negative for abdominal pain, blood in stool, constipation, diarrhea, nausea and vomiting.   Neurological: Negative for dizziness and syncope.   Psychiatric/Behavioral: Negative for agitation, behavioral problems and confusion.          PEx  Temp:  [96.6 °F (35.9 °C)-98.8 °F (37.1 °C)]   Pulse:  [50-88]   Resp:  [10-24]   BP: (124-174)/(58-79)   SpO2:  [88 %-100 %]     Intake/Output Summary (Last 24 hours) at 5/1/2019 1749  Last data filed at 5/1/2019 0747  Gross per 24 hour   Intake 1855.66 ml   Output 2125 ml   Net -269.34 ml     Constitutional: He is oriented to person, place, and time. He appears well-developed and well-nourished. No distress.   HENT:   Head: Normocephalic and atraumatic.   Eyes: Conjunctivae and EOM are normal. No scleral icterus.   Neck: Normal range of motion.   Pulmonary/Chest: Effort normal and breath sounds normal. No respiratory distress.   Abdominal: Soft. Bowel sounds are normal. He exhibits no distension. There is no tenderness. There is no rebound and no guarding.   Musculoskeletal: Normal range of motion.   Neurological: He is alert and oriented to person, place, and time.   Skin: Skin is warm and dry. No rash noted. He is not diaphoretic. No erythema. No pallor.   Psychiatric: He has a normal mood and affect. His behavior is normal. Judgment and thought content normal.         Recent Labs   Lab 04/30/19  1648 05/01/19  0357 05/01/19  1558    WBC 3.51* 3.30* 9.03   HGB 8.3* 8.1* 8.6*   HCT 26.3* 24.9* 26.5*   * 137* 143*     Recent Labs   Lab 04/26/19 2122 04/27/19 0621 04/29/19 0352 04/30/19 0350 05/01/19 0357    140   < > 139 141 143   K 4.5 4.9   < > 3.9 4.2 3.9    115*   < > 110 109 110   CO2 22* 18*   < > 18* 23 24   BUN 25* 47*   < > 26* 17 11   CREATININE 1.5* 1.3   < > 1.3 1.3 1.3   * 118*   < > 96 98 106   CALCIUM 8.8 8.3*   < > 8.5* 8.5* 8.6*   MG  --  1.5*   < > 1.4* 1.5* 1.6   PHOS  --  1.8*  --   --   --   --    LIPASE 76*  --   --   --   --   --     < > = values in this interval not displayed.     Recent Labs   Lab 04/26/19 2122 04/29/19 0352 04/30/19 0350 05/01/19 0357   ALKPHOS 82   < > 67 71 75   ALT 9*   < > 8* 11 9*   AST 11   < > 14 14 13   ALBUMIN 3.0*   < > 2.8* 2.9* 3.1*   PROT 5.7*   < > 4.9* 5.1* 5.5*   BILITOT 0.5   < > 0.5 0.6 0.6   INR 1.0  --   --   --   --     < > = values in this interval not displayed.        No results for input(s): CPK, CPKMB, MB, TROPONINI in the last 72 hours.  No results for input(s): POCTGLUCOSE in the last 168 hours.  No results found for: HGBA1C    Scheduled Meds:   BESIFLOXACIN HCL 0.6% EYE DROPS (patient's own med)  1 drop Right Eye TID    DUREZOL (DIFLUPREDNATE 0.05%) EYE DROPS (patient's own med)  1 drop Right Eye BID    ILEVO (NEPAFENAC 0.3%) EYE DROPS (patient's own med)  1 drop Right Eye Daily     Continuous Infusions:   lactated ringers 100 mL/hr (05/01/19 1109)    pantoprazole 40 mg in dextrose 5 % 100 mL infusion (ready to mix system) 8 mg/hr (05/01/19 1316)     As Needed:  sodium chloride, acetaminophen, ondansetron, ondansetron, oxyCODONE-acetaminophen, oxyCODONE-acetaminophen, sodium chloride 0.9%, sodium chloride 0.9%, sodium chloride 0.9%    Active Hospital Problems    Diagnosis  POA    *Acute upper GI bleed [K92.2]  Yes    Weakness [R53.1]  Yes    Esophageal mass [K22.9]  Yes    Hypomagnesemia [E83.42]  Yes    Acute blood loss anemia  [D62]  Yes    Chronic kidney disease, stage III (moderate) [N18.3]  Yes     12/10/2015: BUN/crea: 32/1.63. CrCl 43.      Hypertension [I10]  Yes     2012: Diagnosed.        Resolved Hospital Problems   No resolved problems to display.         Assessment and Plan    Acute upper GI bleed  Admitted to ICU @[facility]. Underwent EGD which found large circumferential mass of GEJ; biopsies taken.  -He continues on PPI  -Continue H/H q12h.  Transfuse for Hb < 8.0  -Hold aspirin and plavix.  -Trial CLD, with advancement to soft diet tomorrow if remains stable.       Esophageal mass  Per endoscopy (4/27) report:          A large, fungating mass with no bleeding and stigmata of recent        bleeding was found in the lower third of the esophagus and at the        gastroesophageal junction, 32 cm from the incisors. The mass was        partially obstructing and partially circumferential (involving        one-half of the lumen circumference). Biopsies were taken with a        cold forceps for histology.     CTS consulted; appreciate assistance.   Oncology consult placed as patient will require neoadjuvant therapy prior to resection, per CTS; biopsy pathology pending.  AES consulted for EUS, recommend doing it as outpatient .   Open jejunostomy performed on 05/01/2019, NPO until 05/02 as per Gen Surgery       Acute blood loss anemia  -Due to GI bleeding  -Received 1 unit PRBC 4/27  -Check iron, ferritin, b12 and folate  -Repeat h/h q12h, transfuse <7 g/dl.     Hypomagnesemia  -Replete prn     Chronic kidney disease, stage III (moderate)  -back to baseline      Hypertension  -Continue to hold home oral meds in the setting of hypotension and GIB. Resume when clinically appropriate.   -Order hydralazine PRN SBP > 170      High Risk Conditions  PAD, BPH, CKD stage III, HTN, hyperuricemia      Diet: NPO   GI PPx:   DVT PPx:    CHARLES/SCD    Goals of Care: Full code  Discharge plan: pending jujenostomy tube placement and possible inpatient  chemotherapy    Time (minutes) spent in care of the patient (Greater than 1/2 spent in direct face-to-face contact) 35 minutes      Griffin Panchal MD  Staff Hospitalist  Department of Hospital Medicine  [facility]   [phone]       no sports/gym/no heavy lifting/no exercise

## 2019-05-01 NOTE — PLAN OF CARE
Problem: Adult Inpatient Plan of Care  Goal: Plan of Care Review  Outcome: Ongoing (interventions implemented as appropriate)  Plan of care reviewed with patient and family friends at the beside, whom verbalized understanding. All questions answered. Patient returned from surgery AAOx4, VSS. Pain being controlled with PRN pain medication. No incidences of falls or injuries this shift. Bed locked in lowest position, call light in reach. Hourly rounding done for patient safety. Will continue to monitor.

## 2019-05-02 ENCOUNTER — ANESTHESIA EVENT (OUTPATIENT)
Dept: SURGERY | Facility: HOSPITAL | Age: 69
DRG: 375 | End: 2019-05-02
Payer: MEDICARE

## 2019-05-02 ENCOUNTER — ANESTHESIA EVENT (OUTPATIENT)
Dept: ENDOSCOPY | Facility: HOSPITAL | Age: 69
DRG: 375 | End: 2019-05-02
Payer: MEDICARE

## 2019-05-02 ENCOUNTER — ANESTHESIA (OUTPATIENT)
Dept: SURGERY | Facility: HOSPITAL | Age: 69
DRG: 375 | End: 2019-05-02
Payer: MEDICARE

## 2019-05-02 LAB
ALBUMIN SERPL BCP-MCNC: 3.3 G/DL (ref 3.5–5.2)
ALP SERPL-CCNC: 78 U/L (ref 55–135)
ALT SERPL W/O P-5'-P-CCNC: 9 U/L (ref 10–44)
ANION GAP SERPL CALC-SCNC: 8 MMOL/L (ref 8–16)
AST SERPL-CCNC: 16 U/L (ref 10–40)
BASOPHILS # BLD AUTO: 0.01 K/UL (ref 0–0.2)
BASOPHILS # BLD AUTO: 0.01 K/UL (ref 0–0.2)
BASOPHILS NFR BLD: 0.1 % (ref 0–1.9)
BASOPHILS NFR BLD: 0.1 % (ref 0–1.9)
BILIRUB SERPL-MCNC: 0.6 MG/DL (ref 0.1–1)
BUN SERPL-MCNC: 13 MG/DL (ref 8–23)
CALCIUM SERPL-MCNC: 9 MG/DL (ref 8.7–10.5)
CHLORIDE SERPL-SCNC: 105 MMOL/L (ref 95–110)
CO2 SERPL-SCNC: 23 MMOL/L (ref 23–29)
CREAT SERPL-MCNC: 1.5 MG/DL (ref 0.5–1.4)
DIFFERENTIAL METHOD: ABNORMAL
DIFFERENTIAL METHOD: ABNORMAL
EOSINOPHIL # BLD AUTO: 0 K/UL (ref 0–0.5)
EOSINOPHIL # BLD AUTO: 0 K/UL (ref 0–0.5)
EOSINOPHIL NFR BLD: 0 % (ref 0–8)
EOSINOPHIL NFR BLD: 0.3 % (ref 0–8)
ERYTHROCYTE [DISTWIDTH] IN BLOOD BY AUTOMATED COUNT: 15.9 % (ref 11.5–14.5)
ERYTHROCYTE [DISTWIDTH] IN BLOOD BY AUTOMATED COUNT: 16.1 % (ref 11.5–14.5)
EST. GFR  (AFRICAN AMERICAN): 53.4 ML/MIN/1.73 M^2
EST. GFR  (NON AFRICAN AMERICAN): 46.2 ML/MIN/1.73 M^2
GLUCOSE SERPL-MCNC: 109 MG/DL (ref 70–110)
HCT VFR BLD AUTO: 25.2 % (ref 40–54)
HCT VFR BLD AUTO: 25.5 % (ref 40–54)
HGB BLD-MCNC: 7.9 G/DL (ref 14–18)
HGB BLD-MCNC: 8.1 G/DL (ref 14–18)
IMM GRANULOCYTES # BLD AUTO: 0.01 K/UL (ref 0–0.04)
IMM GRANULOCYTES # BLD AUTO: 0.02 K/UL (ref 0–0.04)
IMM GRANULOCYTES NFR BLD AUTO: 0.1 % (ref 0–0.5)
IMM GRANULOCYTES NFR BLD AUTO: 0.3 % (ref 0–0.5)
LYMPHOCYTES # BLD AUTO: 0.6 K/UL (ref 1–4.8)
LYMPHOCYTES # BLD AUTO: 0.9 K/UL (ref 1–4.8)
LYMPHOCYTES NFR BLD: 13.5 % (ref 18–48)
LYMPHOCYTES NFR BLD: 8.8 % (ref 18–48)
MAGNESIUM SERPL-MCNC: 1.4 MG/DL (ref 1.6–2.6)
MCH RBC QN AUTO: 28.2 PG (ref 27–31)
MCH RBC QN AUTO: 28.2 PG (ref 27–31)
MCHC RBC AUTO-ENTMCNC: 31.3 G/DL (ref 32–36)
MCHC RBC AUTO-ENTMCNC: 31.8 G/DL (ref 32–36)
MCV RBC AUTO: 89 FL (ref 82–98)
MCV RBC AUTO: 90 FL (ref 82–98)
MONOCYTES # BLD AUTO: 0.6 K/UL (ref 0.3–1)
MONOCYTES # BLD AUTO: 0.7 K/UL (ref 0.3–1)
MONOCYTES NFR BLD: 8.8 % (ref 4–15)
MONOCYTES NFR BLD: 9.9 % (ref 4–15)
NEUTROPHILS # BLD AUTO: 5.1 K/UL (ref 1.8–7.7)
NEUTROPHILS # BLD AUTO: 5.9 K/UL (ref 1.8–7.7)
NEUTROPHILS NFR BLD: 75.9 % (ref 38–73)
NEUTROPHILS NFR BLD: 82.2 % (ref 38–73)
NRBC BLD-RTO: 0 /100 WBC
NRBC BLD-RTO: 0 /100 WBC
PHOSPHATE SERPL-MCNC: 3.4 MG/DL (ref 2.7–4.5)
PLATELET # BLD AUTO: 149 K/UL (ref 150–350)
PLATELET # BLD AUTO: 150 K/UL (ref 150–350)
PMV BLD AUTO: 10.7 FL (ref 9.2–12.9)
PMV BLD AUTO: 10.9 FL (ref 9.2–12.9)
POTASSIUM SERPL-SCNC: 4.6 MMOL/L (ref 3.5–5.1)
PROT SERPL-MCNC: 5.9 G/DL (ref 6–8.4)
RBC # BLD AUTO: 2.8 M/UL (ref 4.6–6.2)
RBC # BLD AUTO: 2.87 M/UL (ref 4.6–6.2)
SODIUM SERPL-SCNC: 136 MMOL/L (ref 136–145)
WBC # BLD AUTO: 6.68 K/UL (ref 3.9–12.7)
WBC # BLD AUTO: 7.14 K/UL (ref 3.9–12.7)

## 2019-05-02 PROCEDURE — 63600175 PHARM REV CODE 636 W HCPCS: Performed by: NURSE ANESTHETIST, CERTIFIED REGISTERED

## 2019-05-02 PROCEDURE — D9220A PRA ANESTHESIA: Mod: ANES,,, | Performed by: ANESTHESIOLOGY

## 2019-05-02 PROCEDURE — 71000039 HC RECOVERY, EACH ADD'L HOUR: Performed by: SURGERY

## 2019-05-02 PROCEDURE — 63600175 PHARM REV CODE 636 W HCPCS: Performed by: ANESTHESIOLOGY

## 2019-05-02 PROCEDURE — 63600175 PHARM REV CODE 636 W HCPCS: Performed by: STUDENT IN AN ORGANIZED HEALTH CARE EDUCATION/TRAINING PROGRAM

## 2019-05-02 PROCEDURE — D9220A PRA ANESTHESIA: Mod: CRNA,,, | Performed by: NURSE ANESTHETIST, CERTIFIED REGISTERED

## 2019-05-02 PROCEDURE — 80053 COMPREHEN METABOLIC PANEL: CPT

## 2019-05-02 PROCEDURE — D9220A PRA ANESTHESIA: ICD-10-PCS | Mod: CRNA,,, | Performed by: NURSE ANESTHETIST, CERTIFIED REGISTERED

## 2019-05-02 PROCEDURE — 85025 COMPLETE CBC W/AUTO DIFF WBC: CPT

## 2019-05-02 PROCEDURE — 83735 ASSAY OF MAGNESIUM: CPT

## 2019-05-02 PROCEDURE — 36415 COLL VENOUS BLD VENIPUNCTURE: CPT

## 2019-05-02 PROCEDURE — 94761 N-INVAS EAR/PLS OXIMETRY MLT: CPT

## 2019-05-02 PROCEDURE — D9220A PRA ANESTHESIA: ICD-10-PCS | Mod: ANES,,, | Performed by: ANESTHESIOLOGY

## 2019-05-02 PROCEDURE — 20600001 HC STEP DOWN PRIVATE ROOM

## 2019-05-02 PROCEDURE — 37000009 HC ANESTHESIA EA ADD 15 MINS: Performed by: SURGERY

## 2019-05-02 PROCEDURE — 63600175 PHARM REV CODE 636 W HCPCS: Performed by: HOSPITALIST

## 2019-05-02 PROCEDURE — 44300 PR PLACEMENT ENTEROSTOMY/CECOSTOMY TUBE OPEN: ICD-10-PCS | Mod: 78,,, | Performed by: SURGERY

## 2019-05-02 PROCEDURE — C9113 INJ PANTOPRAZOLE SODIUM, VIA: HCPCS | Performed by: STUDENT IN AN ORGANIZED HEALTH CARE EDUCATION/TRAINING PROGRAM

## 2019-05-02 PROCEDURE — 44300 OPEN BOWEL TO SKIN: CPT | Mod: 78,,, | Performed by: SURGERY

## 2019-05-02 PROCEDURE — 36000707: Performed by: SURGERY

## 2019-05-02 PROCEDURE — 25000003 PHARM REV CODE 250: Performed by: STUDENT IN AN ORGANIZED HEALTH CARE EDUCATION/TRAINING PROGRAM

## 2019-05-02 PROCEDURE — 25000003 PHARM REV CODE 250: Performed by: HOSPITALIST

## 2019-05-02 PROCEDURE — 99233 SBSQ HOSP IP/OBS HIGH 50: CPT | Mod: ,,, | Performed by: HOSPITALIST

## 2019-05-02 PROCEDURE — 36000706: Performed by: SURGERY

## 2019-05-02 PROCEDURE — 99233 PR SUBSEQUENT HOSPITAL CARE,LEVL III: ICD-10-PCS | Mod: ,,, | Performed by: HOSPITALIST

## 2019-05-02 PROCEDURE — 71000033 HC RECOVERY, INTIAL HOUR: Performed by: SURGERY

## 2019-05-02 PROCEDURE — 84100 ASSAY OF PHOSPHORUS: CPT

## 2019-05-02 PROCEDURE — 25000003 PHARM REV CODE 250: Performed by: NURSE ANESTHETIST, CERTIFIED REGISTERED

## 2019-05-02 PROCEDURE — 37000008 HC ANESTHESIA 1ST 15 MINUTES: Performed by: SURGERY

## 2019-05-02 RX ORDER — MIDAZOLAM HYDROCHLORIDE 1 MG/ML
INJECTION, SOLUTION INTRAMUSCULAR; INTRAVENOUS
Status: DISCONTINUED | OUTPATIENT
Start: 2019-05-02 | End: 2019-05-02

## 2019-05-02 RX ORDER — CEFAZOLIN SODIUM 1 G/3ML
INJECTION, POWDER, FOR SOLUTION INTRAMUSCULAR; INTRAVENOUS
Status: DISCONTINUED | OUTPATIENT
Start: 2019-05-02 | End: 2019-05-02

## 2019-05-02 RX ORDER — FENTANYL CITRATE 50 UG/ML
INJECTION, SOLUTION INTRAMUSCULAR; INTRAVENOUS
Status: DISCONTINUED | OUTPATIENT
Start: 2019-05-02 | End: 2019-05-02

## 2019-05-02 RX ORDER — ROCURONIUM BROMIDE 10 MG/ML
INJECTION, SOLUTION INTRAVENOUS
Status: DISCONTINUED | OUTPATIENT
Start: 2019-05-02 | End: 2019-05-02

## 2019-05-02 RX ORDER — LIDOCAINE HCL/PF 100 MG/5ML
SYRINGE (ML) INTRAVENOUS
Status: DISCONTINUED | OUTPATIENT
Start: 2019-05-02 | End: 2019-05-02

## 2019-05-02 RX ORDER — PANTOPRAZOLE SODIUM 40 MG/1
40 TABLET, DELAYED RELEASE ORAL
Status: DISCONTINUED | OUTPATIENT
Start: 2019-05-02 | End: 2019-05-07 | Stop reason: HOSPADM

## 2019-05-02 RX ORDER — ONDANSETRON 2 MG/ML
INJECTION INTRAMUSCULAR; INTRAVENOUS
Status: DISCONTINUED | OUTPATIENT
Start: 2019-05-02 | End: 2019-05-02

## 2019-05-02 RX ORDER — HYDROMORPHONE HYDROCHLORIDE 1 MG/ML
0.2 INJECTION, SOLUTION INTRAMUSCULAR; INTRAVENOUS; SUBCUTANEOUS EVERY 5 MIN PRN
Status: DISCONTINUED | OUTPATIENT
Start: 2019-05-02 | End: 2019-05-02 | Stop reason: HOSPADM

## 2019-05-02 RX ORDER — SODIUM CHLORIDE 0.9 % (FLUSH) 0.9 %
10 SYRINGE (ML) INJECTION
Status: DISCONTINUED | OUTPATIENT
Start: 2019-05-02 | End: 2019-05-02 | Stop reason: HOSPADM

## 2019-05-02 RX ORDER — PROPOFOL 10 MG/ML
VIAL (ML) INTRAVENOUS
Status: DISCONTINUED | OUTPATIENT
Start: 2019-05-02 | End: 2019-05-02

## 2019-05-02 RX ORDER — MAGNESIUM SULFATE HEPTAHYDRATE 40 MG/ML
2 INJECTION, SOLUTION INTRAVENOUS ONCE
Status: COMPLETED | OUTPATIENT
Start: 2019-05-02 | End: 2019-05-02

## 2019-05-02 RX ADMIN — HYDROMORPHONE HYDROCHLORIDE 0.2 MG: 1 INJECTION, SOLUTION INTRAMUSCULAR; INTRAVENOUS; SUBCUTANEOUS at 09:05

## 2019-05-02 RX ADMIN — LIDOCAINE HYDROCHLORIDE 100 MG: 20 INJECTION, SOLUTION INTRAVENOUS at 07:05

## 2019-05-02 RX ADMIN — PANTOPRAZOLE SODIUM 8 MG/HR: 40 INJECTION, POWDER, FOR SOLUTION INTRAVENOUS at 05:05

## 2019-05-02 RX ADMIN — MIDAZOLAM HYDROCHLORIDE 2 MG: 1 INJECTION, SOLUTION INTRAMUSCULAR; INTRAVENOUS at 07:05

## 2019-05-02 RX ADMIN — MAGNESIUM SULFATE IN WATER 2 G: 40 INJECTION, SOLUTION INTRAVENOUS at 02:05

## 2019-05-02 RX ADMIN — FENTANYL CITRATE 50 MCG: 50 INJECTION, SOLUTION INTRAMUSCULAR; INTRAVENOUS at 07:05

## 2019-05-02 RX ADMIN — FENTANYL CITRATE 25 MCG: 50 INJECTION, SOLUTION INTRAMUSCULAR; INTRAVENOUS at 08:05

## 2019-05-02 RX ADMIN — PANTOPRAZOLE SODIUM 40 MG: 40 TABLET, DELAYED RELEASE ORAL at 05:05

## 2019-05-02 RX ADMIN — SUGAMMADEX 324 MG: 100 INJECTION, SOLUTION INTRAVENOUS at 08:05

## 2019-05-02 RX ADMIN — SODIUM CHLORIDE, SODIUM LACTATE, POTASSIUM CHLORIDE, AND CALCIUM CHLORIDE 100 ML/HR: 600; 310; 30; 20 INJECTION, SOLUTION INTRAVENOUS at 09:05

## 2019-05-02 RX ADMIN — OXYCODONE HYDROCHLORIDE AND ACETAMINOPHEN 1 TABLET: 10; 325 TABLET ORAL at 02:05

## 2019-05-02 RX ADMIN — OXYCODONE HYDROCHLORIDE AND ACETAMINOPHEN 1 TABLET: 10; 325 TABLET ORAL at 11:05

## 2019-05-02 RX ADMIN — CEFAZOLIN 2 G: 330 INJECTION, POWDER, FOR SOLUTION INTRAMUSCULAR; INTRAVENOUS at 07:05

## 2019-05-02 RX ADMIN — OXYCODONE HYDROCHLORIDE AND ACETAMINOPHEN 1 TABLET: 5; 325 TABLET ORAL at 05:05

## 2019-05-02 RX ADMIN — SODIUM CHLORIDE, SODIUM LACTATE, POTASSIUM CHLORIDE, AND CALCIUM CHLORIDE 100 ML/HR: 600; 310; 30; 20 INJECTION, SOLUTION INTRAVENOUS at 11:05

## 2019-05-02 RX ADMIN — ROCURONIUM BROMIDE 25 MG: 10 INJECTION, SOLUTION INTRAVENOUS at 07:05

## 2019-05-02 RX ADMIN — ROCURONIUM BROMIDE 5 MG: 10 INJECTION, SOLUTION INTRAVENOUS at 07:05

## 2019-05-02 RX ADMIN — SODIUM CHLORIDE, SODIUM GLUCONATE, SODIUM ACETATE, POTASSIUM CHLORIDE, MAGNESIUM CHLORIDE, SODIUM PHOSPHATE, DIBASIC, AND POTASSIUM PHOSPHATE: .53; .5; .37; .037; .03; .012; .00082 INJECTION, SOLUTION INTRAVENOUS at 07:05

## 2019-05-02 RX ADMIN — PROPOFOL 150 MG: 10 INJECTION, EMULSION INTRAVENOUS at 07:05

## 2019-05-02 RX ADMIN — ONDANSETRON 4 MG: 2 INJECTION INTRAMUSCULAR; INTRAVENOUS at 08:05

## 2019-05-02 RX ADMIN — PANTOPRAZOLE SODIUM 8 MG/HR: 40 INJECTION, POWDER, FOR SOLUTION INTRAVENOUS at 11:05

## 2019-05-02 NOTE — PROGRESS NOTES
"This RN called to room by pt, upon entering room, pt was holding up J-tube. Tube completely out, no balloon on end - sutures still in abdomen. No trauma to site noted. Pt verbalized "I was using the bathroom and it just fell out." Pt in no distress, charge RN to bedside to assess pt. IM R & Gen Surg notified - will continue to monitor patient     Gen Surg at bedside   "

## 2019-05-02 NOTE — PROGRESS NOTES
Hospital Medicine  Progress note    [place] [facility] R Griffin Garner MD  Admit Date: 4/26/2019  OLIVIA 5/5/2019  Length of Stay:  LOS: 6 days   Code status: Full Code    Principal Problem:  Acute upper GI bleed    Overview    Interval hx:  Patient seen and examined at bedside,j tube dislodged overnight, replaced by Gen Surgery at the OR today, Plan for EGD tomorrow.       ROS     Constitutional: Negative for fever.   Respiratory: Negative for shortness of breath.    Cardiovascular: Negative for chest pain, palpitations and leg swelling.   Gastrointestinal: Negative for abdominal pain, blood in stool, constipation, diarrhea, nausea and vomiting.   Neurological: Negative for dizziness and syncope.   Psychiatric/Behavioral: Negative for agitation, behavioral problems and confusion.          PEx  Temp:  [96.5 °F (35.8 °C)-98.3 °F (36.8 °C)]   Pulse:  [58-97]   Resp:  [13-20]   BP: (126-156)/(55-99)   SpO2:  [91 %-100 %]     Intake/Output Summary (Last 24 hours) at 5/2/2019 1723  Last data filed at 5/2/2019 1159  Gross per 24 hour   Intake 3310 ml   Output 1875 ml   Net 1435 ml     Constitutional: He is oriented to person, place, and time. He appears well-developed and well-nourished. No distress.   HENT:   Head: Normocephalic and atraumatic.   Eyes: Conjunctivae and EOM are normal. No scleral icterus.   Neck: Normal range of motion.   Pulmonary/Chest: Effort normal and breath sounds normal. No respiratory distress.   Abdominal: Soft. Bowel sounds are normal. He exhibits no distension. There is no tenderness. There is no rebound and no guarding.   Musculoskeletal: Normal range of motion.   Neurological: He is alert and oriented to person, place, and time.   Skin: Skin is warm and dry. No rash noted. He is not diaphoretic. No erythema. No pallor.   Psychiatric: He has a normal mood and affect. His behavior is normal. Judgment and thought content normal.         Recent Labs   Lab 05/01/19  1558 05/02/19  0305  05/02/19  1550   WBC 9.03 7.14 6.68   HGB 8.6* 8.1* 7.9*   HCT 26.5* 25.5* 25.2*   * 149* 150     Recent Labs   Lab 04/26/19 2122 04/27/19  0621 04/30/19 0350 05/01/19 0357 05/02/19  0305    140   < > 141 143 136   K 4.5 4.9   < > 4.2 3.9 4.6    115*   < > 109 110 105   CO2 22* 18*   < > 23 24 23   BUN 25* 47*   < > 17 11 13   CREATININE 1.5* 1.3   < > 1.3 1.3 1.5*   * 118*   < > 98 106 109   CALCIUM 8.8 8.3*   < > 8.5* 8.6* 9.0   MG  --  1.5*   < > 1.5* 1.6 1.4*   PHOS  --  1.8*  --   --   --  3.4   LIPASE 76*  --   --   --   --   --     < > = values in this interval not displayed.     Recent Labs   Lab 04/26/19 2122 04/30/19 0350 05/01/19 0357 05/02/19  0305   ALKPHOS 82   < > 71 75 78   ALT 9*   < > 11 9* 9*   AST 11   < > 14 13 16   ALBUMIN 3.0*   < > 2.9* 3.1* 3.3*   PROT 5.7*   < > 5.1* 5.5* 5.9*   BILITOT 0.5   < > 0.6 0.6 0.6   INR 1.0  --   --   --   --     < > = values in this interval not displayed.        No results for input(s): CPK, CPKMB, MB, TROPONINI in the last 72 hours.  No results for input(s): POCTGLUCOSE in the last 168 hours.  No results found for: HGBA1C    Scheduled Meds:   BESIFLOXACIN HCL 0.6% EYE DROPS (patient's own med)  1 drop Right Eye TID    DUREZOL (DIFLUPREDNATE 0.05%) EYE DROPS (patient's own med)  1 drop Right Eye BID    ILEVO (NEPAFENAC 0.3%) EYE DROPS (patient's own med)  1 drop Right Eye Daily    pantoprazole  40 mg Oral BID AC     Continuous Infusions:   lactated ringers 100 mL/hr (05/02/19 9308)     As Needed:  sodium chloride, acetaminophen, ondansetron, ondansetron, oxyCODONE-acetaminophen, oxyCODONE-acetaminophen, sodium chloride 0.9%, sodium chloride 0.9%, sodium chloride 0.9%    Active Hospital Problems    Diagnosis  POA    *Acute upper GI bleed [K92.2]  Yes    Weakness [R53.1]  Yes    Esophageal mass [K22.9]  Yes    Hypomagnesemia [E83.42]  Yes    Acute blood loss anemia [D62]  Yes    Chronic kidney disease, stage III  (moderate) [N18.3]  Yes     12/10/2015: BUN/crea: 32/1.63. CrCl 43.      Hypertension [I10]  Yes     2012: Diagnosed.        Resolved Hospital Problems   No resolved problems to display.         Assessment and Plan    Acute upper GI bleed  Admitted to ICU @[facility]. Underwent EGD which found large circumferential mass of GEJ; biopsies taken.  -He continues on PPI  -Continue H/H q12h.  Transfuse for Hb < 8.0  -Hold aspirin and plavix.  -Trial CLD, with advancement to soft diet tomorrow if remains stable.       Esophageal mass  Per endoscopy (4/27) report:          A large, fungating mass with no bleeding and stigmata of recent        bleeding was found in the lower third of the esophagus and at the        gastroesophageal junction, 32 cm from the incisors. The mass was        partially obstructing and partially circumferential (involving        one-half of the lumen circumference). Biopsies were taken with a        cold forceps for histology.     CTS consulted; appreciate assistance.   Oncology consult placed as patient will require neoadjuvant therapy prior to resection, per CTS; biopsy pathology pending.  AES to perform EUS Tomorrow for staging purposes .   Open jejunostomy performed on 05/01/2019,revisited on 05/02 after dislodgement of the tube       Acute blood loss anemia  -Due to GI bleeding  -Received 1 unit PRBC 4/27  -Check iron, ferritin, b12 and folate  -Repeat h/h q12h, transfuse <7 g/dl.     Hypomagnesemia  -Replete prn     Chronic kidney disease, stage III (moderate)  -back to baseline      Hypertension  -Continue to hold home oral meds in the setting of hypotension and GIB. Resume when clinically appropriate.   -Order hydralazine PRN SBP > 170      High Risk Conditions  PAD, BPH, CKD stage III, HTN, hyperuricemia      Diet: NPO   GI PPx:   DVT PPx:    CHARLES/SCD    Goals of Care: Full code  Discharge plan: pending jujenostomy tube placement and possible inpatient chemotherapy    Time (minutes) spent in  care of the patient (Greater than 1/2 spent in direct face-to-face contact) 35 minutes      Griffin Panchal MD  Staff Hospitalist  Department of Hospital Medicine  [facility]   [phone]

## 2019-05-02 NOTE — ASSESSMENT & PLAN NOTE
POD 1 from open jejunostomy. J tube fell out over night.     -OR today for replacement of jejunostomy tube.   - Consent obtained.   - Please call if any questions, thank you.

## 2019-05-02 NOTE — TRANSFER OF CARE
"Anesthesia Transfer of Care Note    Patient: Brooks Canas    Procedure(s) Performed: Procedure(s) (LRB):  LAPAROTOMY, EXPLORATORY (N/A)  REVISION- Jejunostomy (N/A)    Patient location: PACU    Anesthesia Type: general    Transport from OR: Transported from OR on 2-3 L/min O2 by NC with adequate spontaneous ventilation    Post pain: adequate analgesia    Post assessment: tolerated procedure well and no apparent anesthetic complications    Post vital signs: stable    Level of consciousness: awake, alert and oriented    Nausea/Vomiting: no nausea/vomiting    Complications: none    Transfer of care protocol was followed      Last vitals:   Visit Vitals  BP (!) 126/99 (BP Location: Right arm, Patient Position: Lying)   Pulse 95   Temp 36.7 °C (98.1 °F) (Temporal)   Resp 20   Ht 5' 10" (1.778 m)   Wt 81 kg (178 lb 9.2 oz)   SpO2 95%   BMI 25.62 kg/m²     "

## 2019-05-02 NOTE — TREATMENT PLAN
AES Follow-up Note    PATH returned:    Positive for poorly diff adenoca    We will plan EUS for staging tomorrow  Keep NPO past mn and hold heparin products    Discussed with primary team.    Please call with any additional concerns /questions    Wesley Lockwood MD  Gastroenterology Fellow ([id number])  Pager: [phone]

## 2019-05-02 NOTE — ANESTHESIA PREPROCEDURE EVALUATION
05/02/2019  Brooks Canas is a 69 y.o., male.  Pre-operative evaluation for Procedure(s) (LRB):  LAPAROTOMY, EXPLORATORY (N/A)      Brooks Canas is a 69 y.o. male with PAD (s/p Right fem-pop bypass with SVG in 2016; on Plavix), BPH, CKD stage III, HTN, hyperuricemia, and tobacco abuse who presented as a transfer from OSH on 4/26 for hematemesis.  CT scan suspicious for malignancy of the distal esophagus.  EGD performed revealing large fungating mass in the lower third of the esophagus.     S/p j-tube yesterday that was unintentionally pulled overnight. Plan now on procedure above. NPO for over 24 hours.    LDA: L hand 18g    Prev airway:   Present Prior to Hospital Arrival?: No; Placement Date: 05/01/19; Placement Time: 0958; Method of Intubation: Direct laryngoscopy; Inserted by: CRNA; Airway Device: Endotracheal Tube; Mask Ventilation: Not Attempted; Intubated: Postinduction; Blade: Mora #2; Airway Device Size: 7.5; Style: Cuffed; Cuff Inflation: Minimal occlusive pressure; Inflation Amount: 7; Placement Verified By: Auscultation, Capnometry; Grade: Grade I; Complicating Factors: None; Intubation Findings: Positive EtCO2, Bilateral breath sounds, Atraumatic/Condition of teeth unchanged;  Depth of Insertion: 21; Securment: Lips; Complications: None; Breath Sounds: Equal Bilateral; Insertion Attempts: 1; Removal Date: 05/01/19;  Removal Time: 1056    Drips: Protonix    Patient Active Problem List   Diagnosis    Peripheral artery disease    Hypertension    Chronic kidney disease, stage III (moderate)    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      Current Outpatient Medications on File Prior to Encounter   Medication Sig Dispense Refill    besifloxacin (BESIVANCE) 0.6 % DrpS 1 drop 3 (three) times daily.      nepafenac (ILEVRO) 0.3 % DrpS Apply 1 drop to eye once daily.      allopurinol (ZYLOPRIM) 100 MG tablet Take 100 mg by mouth once daily.  3    amLODIPine (NORVASC) 10 MG tablet TAKE 1 TABLET(10 MG) BY MOUTH EVERY DAY 90 tablet 0    atorvastatin (LIPITOR) 40 MG tablet TAKE 1 TABLET BY MOUTH ONCE DAILY 30 tablet 0    benazepril (LOTENSIN) 20 MG tablet TAKE 1 TABLET(20 MG) BY MOUTH EVERY DAY 90 tablet 0    carvedilol (COREG) 6.25 MG tablet TAKE 1 TABLET(6.25 MG) BY MOUTH TWICE DAILY 180 tablet 0    clopidogrel (PLAVIX) 75 mg tablet TAKE 1 TABLET BY MOUTH ONCE DAILY. 90 tablet 0    clopidogrel (PLAVIX) 75 mg tablet TAKE 1 TABLET BY MOUTH ONCE DAILY. 90 tablet 0    tamsulosin (FLOMAX) 0.4 mg Cp24 Take 0.4 mg by mouth once daily.   2       Past Surgical History:   Procedure Laterality Date    ANGIOGRAM-EXTREMITY-LOWER Bilateral 3/2/2016    Performed by Phoenix Ayers MD at [facility] [place] CATH LAB    AORTOGRAM WITH RUNOFF Bilateral 5/4/2017    Performed by Phoenix Ayers MD at [facility] [place] CATH LAB    APPENDECTOMY      LEDLBA-RVZRDVP-IIPPMPGHX Right 3/4/2016    Performed by Aris Finney Jr., MD at [facility] [place] OR    CATARACT EXTRACTION Right     EGD (ESOPHAGOGASTRODUODENOSCOPY) Left 4/27/2019    Performed by Carine Le MD at [facility] [place] ENDO    FEMORAL BYPASS Right 2016       Social History     Socioeconomic History    Marital status: Single     Spouse name: Not on file    Number of children: Not on file    Years of education: Not on file    Highest education level: Not on file   Occupational History    Not on file   Social Needs    Financial resource strain: Not on file    Food insecurity:     Worry: Not on file     Inability: Not on file    Transportation needs:     Medical: Not on file      Non-medical: Not on file   Tobacco Use    Smoking status: Former Smoker     Packs/day: 0.75     Years: 46.00     Pack years: 34.50     Start date: 1/1/1970     Last attempt to quit: 3/1/2016     Years since quitting: 3.1    Smokeless tobacco: Never Used   Substance and Sexual Activity    Alcohol use: No     Alcohol/week: 0.0 oz    Drug use: Not Currently    Sexual activity: Not on file   Lifestyle    Physical activity:     Days per week: Not on file     Minutes per session: Not on file    Stress: Not on file   Relationships    Social connections:     Talks on phone: Not on file     Gets together: Not on file     Attends Advent service: Not on file     Active member of club or organization: Not on file     Attends meetings of clubs or organizations: Not on file     Relationship status: Not on file   Other Topics Concern    Not on file   Social History Narrative    Not on file         Vital Signs Range (Last 24H):  Temp:  [36.1 °C (97 °F)-37 °C (98.6 °F)]   Pulse:  [50-97]   Resp:  [10-24]   BP: (135-173)/(62-79)   SpO2:  [88 %-100 %]       CBC:   Recent Labs     05/01/19  1558 05/02/19  0305   WBC 9.03 7.14   RBC 3.00* 2.87*   HGB 8.6* 8.1*   HCT 26.5* 25.5*   * 149*   MCV 88 89   MCH 28.7 28.2   MCHC 32.5 31.8*       CMP:   Recent Labs     05/01/19  0357 05/02/19  0305    136   K 3.9 4.6    105   CO2 24 23   BUN 11 13   CREATININE 1.3 1.5*    109   MG 1.6 1.4*   CALCIUM 8.6* 9.0   ALBUMIN 3.1* 3.3*   PROT 5.5* 5.9*   ALKPHOS 75 78   ALT 9* 9*   AST 13 16   BILITOT 0.6 0.6       INR  No results for input(s): PT, INR, PROTIME, APTT in the last 72 hours.    Cardiac Studies:  EKG:   Vent. Rate : 078 BPM     Atrial Rate : 078 BPM     P-R Int : 000 ms          QRS Dur : 084 ms      QT Int : 348 ms       P-R-T Axes : 000 -17 052 degrees     QTc Int : 397 ms    Sinus rhythm  Low voltage QRS  Cannot rule out Anterior infarct ,age undetermined  Abnormal ECG    Confirmed by Armen QUIROGA,  Phoenix ([id number]) on 4/27/2019 5:13:27 PM     2D Echo:  No results found for this or any previous visit.        Anesthesia Evaluation    I have reviewed the Patient Summary Reports.    I have reviewed the Nursing Notes.   I have reviewed the Medications.     Review of Systems  Anesthesia Hx:  No problems with previous Anesthesia  History of prior surgery of interest to airway management or planning: Denies Family Hx of Anesthesia complications.   Denies Personal Hx of Anesthesia complications.   Social:  Non-Smoker    Hematology/Oncology:     Oncology Normal    -- Anemia: Hematology Comments: Received packed cells last night    EENT/Dental:EENT/Dental Normal   Cardiovascular:   Hypertension PVD On Plavix   Pulmonary:  Pulmonary Normal    Renal/:  Renal/ Normal     Hepatic/GI:  Hepatic/GI Normal GI bleed   Musculoskeletal:  Musculoskeletal Normal    Neurological:  Neurology Normal    Endocrine:  Endocrine Normal    Dermatological:  Skin Normal    Psych:  Psychiatric Normal           Physical Exam  General:  Well nourished    Airway/Jaw/Neck:  Airway Findings: Mouth Opening: Normal Mallampati: I  Jaw/Neck Findings:  Neck ROM: Normal ROM      Dental:  Dental Findings: Edentulous, Upper Dentures, Lower Dentures   Chest/Lungs:  Chest/Lungs Findings: Clear to auscultation, Normal Respiratory Rate     Heart/Vascular:  Heart Findings: Rate: Normal  Rhythm: Regular Rhythm        Mental Status:  Mental Status Findings:  Cooperative, Alert and Oriented         Anesthesia Plan  Type of Anesthesia, risks & benefits discussed:  Anesthesia Type:  general  Patient's Preference: GA  Intra-op Monitoring Plan: standard ASA monitors  Intra-op Monitoring Plan Comments:   Post Op Pain Control Plan: per primary service following discharge from PACU, IV/PO Opioids PRN and multimodal analgesia  Post Op Pain Control Plan Comments:   Induction:   IV  Beta Blocker:  Patient is on a Beta-Blocker and has received one dose within the past 24 hours  (No further documentation required).       Informed Consent: Patient understands risks and agrees with Anesthesia plan.  Questions answered. Anesthesia consent signed with patient.  ASA Score: 3  emergent   Day of Surgery Review of History & Physical:    H&P update referred to the surgeon.     Anesthesia Plan Notes: The patient's PMH was reviewed and PE was performed  Plan for GETA  Surgeon has booked the case as a Class A Emergency        Ready For Surgery From Anesthesia Perspective.

## 2019-05-02 NOTE — ANESTHESIA PREPROCEDURE EVALUATION
[facility]  Anesthesia Pre-Operative Evaluation         Patient Name: Brooks Canas  YOB: 1950  MRN: [id number]    SUBJECTIVE:     Pre-operative evaluation for Procedure(s) (LRB):  ULTRASOUND, UPPER GI TRACT, ENDOSCOPIC (N/A)     05/02/2019    Brooks Canas is a 69 y.o. male w/ a significant PMHx of h/o PAD (s/p Right fem-pop bypass with SVG in 2016; on Plavix), BPH, CKD stage III, HTN, hyperuricemia, and tobacco abuse who presents as a transfer from OSH for hematemesis. CT scan suspicious for malignancy of the distal esophagus.  EGD performed revealing large fungating mass in the lower third of the esophagus. Pathology positive for poorly differentiated adenocarcinoma. EUS for staging tomorrow.    Patient now presents for the above procedure(s).      LDA:        Peripheral IV - Single Lumen 04/28/19 1746 18 G Left;Posterior Hand (Active)   Site Assessment Clean;Dry;Intact;No redness;No swelling 5/2/2019 10:15 AM   Line Status Infusing 5/2/2019 10:15 AM   Dressing Status Clean;Dry;Intact 5/2/2019 10:15 AM   Dressing Change Due 05/02/19 5/1/2019  7:47 AM   Site Change Due 05/02/19 5/1/2019  7:47 AM   Reason Not Rotated Not due 5/1/2019 12:00 PM   Number of days: 3            Gastrostomy/Enterostomy 05/01/19 1018 Jejunostomy tube LUQ feeding (Active)   Securement secured to abdomen 5/2/2019 10:15 AM   Clamp Status/Tolerance clamped 5/2/2019 10:15 AM   Dressing dry and intact 5/2/2019 10:15 AM   Insertion Site other (see comments) 5/1/2019 12:05 PM   Intake (mL) 0 mL 5/2/2019  5:10 AM   Water Bolus (mL) 60 mL 5/2/2019  5:10 AM   Number of days: 1            Gastrostomy/Enterostomy 05/02/19 0821 Jejunostomy tube LUQ (Active)   Number of days: 0       Prev airway: Easy mask ventilation. Grade I view on DL, no complications.     Drips:    lactated ringers 100 mL/hr (05/02/19 0927)       Patient Active Problem List   Diagnosis    Peripheral artery disease    Hypertension     Chronic kidney disease, stage III (moderate)    Atherosclerosis of artery of extremity with rest pain    Leg swelling    History of tobacco use    Acute upper GI bleed    Hypomagnesemia    Acute blood loss anemia    Esophageal mass    Weakness       Review of patient's allergies indicates:  No Known Allergies    Current Inpatient Medications:   BESIFLOXACIN HCL 0.6% EYE DROPS (patient's own med)  1 drop Right Eye TID    DUREZOL (DIFLUPREDNATE 0.05%) EYE DROPS (patient's own med)  1 drop Right Eye BID    ILEVO (NEPAFENAC 0.3%) EYE DROPS (patient's own med)  1 drop Right Eye Daily    magnesium sulfate IVPB  2 g Intravenous Once    pantoprazole  40 mg Oral BID AC       No current facility-administered medications on file prior to encounter.      Current Outpatient Medications on File Prior to Encounter   Medication Sig Dispense Refill    besifloxacin (BESIVANCE) 0.6 % DrpS 1 drop 3 (three) times daily.      nepafenac (ILEVRO) 0.3 % DrpS Apply 1 drop to eye once daily.      allopurinol (ZYLOPRIM) 100 MG tablet Take 100 mg by mouth once daily.  3    amLODIPine (NORVASC) 10 MG tablet TAKE 1 TABLET(10 MG) BY MOUTH EVERY DAY 90 tablet 0    atorvastatin (LIPITOR) 40 MG tablet TAKE 1 TABLET BY MOUTH ONCE DAILY 30 tablet 0    benazepril (LOTENSIN) 20 MG tablet TAKE 1 TABLET(20 MG) BY MOUTH EVERY DAY 90 tablet 0    carvedilol (COREG) 6.25 MG tablet TAKE 1 TABLET(6.25 MG) BY MOUTH TWICE DAILY 180 tablet 0    clopidogrel (PLAVIX) 75 mg tablet TAKE 1 TABLET BY MOUTH ONCE DAILY. 90 tablet 0    clopidogrel (PLAVIX) 75 mg tablet TAKE 1 TABLET BY MOUTH ONCE DAILY. 90 tablet 0    tamsulosin (FLOMAX) 0.4 mg Cp24 Take 0.4 mg by mouth once daily.   2       Past Surgical History:   Procedure Laterality Date    ANGIOGRAM-EXTREMITY-LOWER Bilateral 3/2/2016    Performed by Phoenix Ayers MD at [facility], [place], operated by [facility] CATH LAB    AORTOGRAM WITH RUNOFF Bilateral 5/4/2017    Performed by Phoenix Ayers MD at [facility], [place], operated by [facility] CATH LAB     APPENDECTOMY      [id number] Right 3/4/2016    Performed by [facility] Jr[facility] at [facility] OR    CATARACT EXTRACTION Right     EGD (ESOPHAGOGASTRODUODENOSCOPY) Left 4/27/2019    Performed by Carine Le MD at [facility] ENDO    FEMORAL BYPASS Right 2016       Social History     Socioeconomic History    Marital status: Single     Spouse name: Not on file    Number of children: Not on file    Years of education: Not on file    Highest education level: Not on file   Occupational History    Not on file   Social Needs    Financial resource strain: Not on file    Food insecurity:     Worry: Not on file     Inability: Not on file    Transportation needs:     Medical: Not on file     Non-medical: Not on file   Tobacco Use    Smoking status: Former Smoker     Packs/day: 0.75     Years: 46.00     Pack years: 34.50     Start date: 1/1/1970     Last attempt to quit: 3/1/2016     Years since quitting: 3.1    Smokeless tobacco: Never Used   Substance and Sexual Activity    Alcohol use: No     Alcohol/week: 0.0 oz    Drug use: Not Currently    Sexual activity: Not on file   Lifestyle    Physical activity:     Days per week: Not on file     Minutes per session: Not on file    Stress: Not on file   Relationships    Social connections:     Talks on phone: Not on file     Gets together: Not on file     Attends Ep[employer] service: Not on file     Active member of club or organization: Not on file     Attends meetings of clubs or organizations: Not on file     Relationship status: Not on file   Other Topics Concern    Not on file   Social History Narrative    Not on file       OBJECTIVE:     Vital Signs Range (Last 24H):  Temp:  [35.8 °C (96.5 °F)-36.8 °C (98.3 °F)]   Pulse:  [52-97]   Resp:  [13-20]   BP: (126-156)/(55-99)   SpO2:  [91 %-100 %]       Significant Labs:  Lab Results   Component Value Date    WBC 7.14 05/02/2019    HGB 8.1 (L) 05/02/2019    HCT 25.5 (L) 05/02/2019     (L)  05/02/2019    CHOL 144 08/31/2016    TRIG 135 08/31/2016    HDL 37 (L) 08/31/2016    ALT 9 (L) 05/02/2019    AST 16 05/02/2019     05/02/2019    K 4.6 05/02/2019     05/02/2019    CREATININE 1.5 (H) 05/02/2019    BUN 13 05/02/2019    CO2 23 05/02/2019    INR 1.0 04/26/2019     ardiac Studies:  EKG:   Vent. Rate : 078 BPM     Atrial Rate : 078 BPM     P-R Int : 000 ms          QRS Dur : 084 ms      QT Int : 348 ms       P-R-T Axes : 000 -17 052 degrees     QTc Int : 397 ms    Sinus rhythm  Low voltage QRS  Cannot rule out Anterior infarct ,age undetermined  Abnormal ECG    Confirmed by Phoenix Ayers MD ([id number]) on 4/27/2019 5:13:27 PM    2D Echo:  No results found for this or any previous visit.      ASSESSMENT/PLAN:       Anesthesia Evaluation    I have reviewed the Patient Summary Reports.    I have reviewed the Nursing Notes.   I have reviewed the Medications.     Review of Systems  Anesthesia Hx:  No problems with previous Anesthesia  History of prior surgery of interest to airway management or planning: Denies Family Hx of Anesthesia complications.   Denies Personal Hx of Anesthesia complications.   Social:  Non-Smoker    Hematology/Oncology:     Oncology Normal    -- Anemia:   EENT/Dental:EENT/Dental Normal   Cardiovascular:   Hypertension PVD On Plavix   Pulmonary:  Pulmonary Normal    Renal/:  Renal/ Normal     Hepatic/GI:  Hepatic/GI Normal GI bleed   Musculoskeletal:  Musculoskeletal Normal    Neurological:  Neurology Normal    Endocrine:  Endocrine Normal    Dermatological:  Skin Normal    Psych:  Psychiatric Normal           Physical Exam  General:  Well nourished    Airway/Jaw/Neck:  Airway Findings: Mouth Opening: Normal Mallampati: I  Jaw/Neck Findings:  Neck ROM: Normal ROM      Dental:  Dental Findings: Edentulous, Upper Dentures, Lower Dentures   Chest/Lungs:  Chest/Lungs Findings: Clear to auscultation, Normal Respiratory Rate     Heart/Vascular:  Heart Findings: Rate: Normal   Rhythm: Regular Rhythm        Mental Status:  Mental Status Findings:  Cooperative, Alert and Oriented         Anesthesia Plan  Type of Anesthesia, risks & benefits discussed:  Anesthesia Type:  general  Patient's Preference:   Intra-op Monitoring Plan: standard ASA monitors  Intra-op Monitoring Plan Comments:   Post Op Pain Control Plan: multimodal analgesia and per primary service following discharge from PACU  Post Op Pain Control Plan Comments:   Induction:   IV  Beta Blocker:         Informed Consent: Patient understands risks and agrees with Anesthesia plan.  Questions answered. Anesthesia consent signed with patient.  ASA Score: 3     Day of Surgery Review of History & Physical:    H&P update referred to the provider.         Ready For Surgery From Anesthesia Perspective.

## 2019-05-02 NOTE — ANESTHESIA POSTPROCEDURE EVALUATION
Anesthesia Post Evaluation    Patient: Brooks Canas    Procedure(s) Performed: Procedure(s) (LRB):  LAPAROTOMY, EXPLORATORY (N/A)  REVISION- Jejunostomy (N/A)    Final Anesthesia Type: general  Patient location during evaluation: PACU  Patient participation: Yes- Able to Participate  Level of consciousness: awake and alert  Post-procedure vital signs: reviewed and stable  Pain management: adequate  Airway patency: patent  PONV status at discharge: No PONV  Anesthetic complications: no      Cardiovascular status: blood pressure returned to baseline  Respiratory status: unassisted and spontaneous ventilation  Hydration status: euvolemic  Follow-up not needed.          Vitals Value Taken Time   /60 5/2/2019 11:00 AM   Temp 36.1 °C (97 °F) 5/2/2019 11:00 AM   Pulse 63 5/2/2019 11:37 AM   Resp 18 5/2/2019 11:00 AM   SpO2 97 % 5/2/2019 11:00 AM         Event Time     Out of Recovery 05/02/2019 10:37:36          Pain/Saji Score: Pain Rating Prior to Med Admin: 4 (5/2/2019  9:45 AM)  Pain Rating Post Med Admin: 0 (5/1/2019 12:05 PM)  Saji Score: 9 (5/2/2019 10:15 AM)

## 2019-05-02 NOTE — SUBJECTIVE & OBJECTIVE
Interval History: J tube fell out overnight. Patient is in no distress and feels well.     Medications:  Continuous Infusions:   lactated ringers 100 mL/hr (05/02/19 0927)    pantoprazole 40 mg in dextrose 5 % 100 mL infusion (ready to mix system) 8 mg/hr (05/02/19 0510)     Scheduled Meds:   BESIFLOXACIN HCL 0.6% EYE DROPS (patient's own med)  1 drop Right Eye TID    DUREZOL (DIFLUPREDNATE 0.05%) EYE DROPS (patient's own med)  1 drop Right Eye BID    ILEVO (NEPAFENAC 0.3%) EYE DROPS (patient's own med)  1 drop Right Eye Daily     PRN Meds:sodium chloride, acetaminophen, HYDROmorphone, ondansetron, ondansetron, oxyCODONE-acetaminophen, oxyCODONE-acetaminophen, sodium chloride 0.9%, sodium chloride 0.9%, sodium chloride 0.9%, sodium chloride 0.9%     Review of patient's allergies indicates:  No Known Allergies  Objective:     Vital Signs (Most Recent):  Temp: 98.1 °F (36.7 °C) (05/02/19 0836)  Pulse: 71 (05/02/19 0900)  Resp: 16 (05/02/19 0900)  BP: (!) 155/72 (05/02/19 0900)  SpO2: 99 % (05/02/19 0900) Vital Signs (24h Range):  Temp:  [96.5 °F (35.8 °C)-98.3 °F (36.8 °C)] 98.1 °F (36.7 °C)  Pulse:  [52-97] 71  Resp:  [10-24] 16  SpO2:  [88 %-100 %] 99 %  BP: (126-168)/(55-99) 155/72     Weight: 81 kg (178 lb 9.2 oz)  Body mass index is 25.62 kg/m².    Intake/Output - Last 3 Shifts       04/30 0700 - 05/01 0659 05/01 0700 - 05/02 0659 05/02 0700 - 05/03 0659    P.O. 1400 50     I.V. (mL/kg) 2722 (33.7) 2900 (35.9) 300 (3.7)    NG/GT  60     Total Intake(mL/kg) 4122 (51.1) 3010 (37.3) 300 (3.7)    Urine (mL/kg/hr) 2550 (1.3) 1825 (0.9)     Stool 0 0     Total Output 2550 1825     Net +1572 +1185 +300           Urine Occurrence 4 x      Stool Occurrence 1 x 0 x           Physical Exam   Constitutional: He is oriented to person, place, and time. He appears well-developed and well-nourished. No distress.   HENT:   Head: Normocephalic and atraumatic.   Eyes: Conjunctivae and EOM are normal. No scleral icterus.    Neck: Normal range of motion.   Pulmonary/Chest: Effort normal and breath sounds normal. No respiratory distress.   Abdominal: Soft. Bowel sounds are normal. He exhibits no distension. There is no tenderness. There is no rebound and no guarding.   Musculoskeletal: Normal range of motion.   Neurological: He is alert and oriented to person, place, and time.   Skin: Skin is warm and dry. No rash noted. He is not diaphoretic. No erythema. No pallor.   Psychiatric: He has a normal mood and affect. His behavior is normal. Judgment and thought content normal.   Nursing note and vitals reviewed.      Significant Labs:  CBC:   Recent Labs   Lab 05/02/19  0305   WBC 7.14   RBC 2.87*   HGB 8.1*   HCT 25.5*   *   MCV 89   MCH 28.2   MCHC 31.8*     CMP:   Recent Labs   Lab 05/02/19  0305      CALCIUM 9.0   ALBUMIN 3.3*   PROT 5.9*      K 4.6   CO2 23      BUN 13   CREATININE 1.5*   ALKPHOS 78   ALT 9*   AST 16   BILITOT 0.6       Significant Diagnostics:  I have reviewed all pertinent imaging results/findings within the past 24 hours.

## 2019-05-02 NOTE — OP NOTE
DATE OF PROCEDURE: 05/02/2019    PREOPERATIVE DIAGNOSES:   1. Esophageal cancer  2. Malfunction of j-tube     POSTOPERATIVE DIAGNOSES:   1. Same    PROCEDURES PERFORMED:   1. Reopening of Recent laparotomy  2. Replacement of j-tube    ATTENDING SURGEON: Everardo Smith MD    HOUSESTAFF SURGEON: Lencho Mancera MD      ANESTHESIA: General    ESTIMATED BLOOD LOSS: minimal     FINDINGS: No free fluid upon entering abdomen; 12 F j-tube place in a Witzel fashion with Cris sutures     SPECIMEN: None    DRAINS: None     COMPLICATIONS: None.     INDICATIONS: Brooks Canas is a 69 y.o.male who was recently diagnosed with esophageal cancer. He underwent an open j-tube procedure on 5/1 and the j-tube was inadvertently removed on POD 0.      OPERATIVE PROCEDURE: The patient was identified in preoperative holding and brought back to the operating room. Anesthesia was induced. A timeout procedure was performed and all team members present agreed this was the correct procedure on the correct patient. The abdomen was prepped and draped in a sterile manner. The skin staples were removed and fascial sutures were cut opening the abdomen. No fluid was noted upon opening the abdomen. The previous j-tube site remained attached to the anterior abdominal wall with Cris sutures. These were cut away. The bowel was examined and the decision was made to over sew the previous Witzel tunnel and replace the j-tube distally. 3-0 interrupted silk sutures were used to over sew the Witzel tunnel transversely.  A site approximately 10 cm distal to the previous j-tube was chosen and a 3-0 silk purse string was placed. An enterotomy was made and a 12 F red rubber catheter was placed. The purse string was tied down and Witzel tunnel was created with 3-0 silk sutures. The bowel was then attached to the anterior abdominal wall with Cris sutures. The j-tube flushed easily and secured to the skin with a drain stitch.The abdomen was irrigated with  saline and the fascia was closed with a running 1-0 PDS suture. The skin was closed with skin staples and a sterile dressing was applied. The patient was extubated in the OR and brought to recovery with anesthesia. Dr. Smith was scrubbed for the entirety of the case.

## 2019-05-02 NOTE — NURSING TRANSFER
Nursing Transfer Note      5/2/2019     Transfer To: 1059 From PACU    Transfer via bed    Transfer with IV fluids to O2, cardiac monitoring    Transported by RN and PCT    Medicines sent: None    Chart send with patient: Yes    Notified: spouse    Patient reassessed at: 5/2/19 @ 1015    Upon arrival to floor:

## 2019-05-02 NOTE — PLAN OF CARE
Problem: Adult Inpatient Plan of Care  Goal: Plan of Care Review  Outcome: Ongoing (interventions implemented as appropriate)  Plan of care reviewed with support at the bedside, whom verbalized understanding. All questions answered. Patient AAOx4, VSS. Patient verbalized understanding of importance of NPO status after surgery. Patients pain being controlled with PRN pain medication. No falls or injuries this shift. Bed locked in lowest position, call light in reach, hourly rounding done for patient safety. Will continue to monitor.

## 2019-05-02 NOTE — SIGNIFICANT EVENT
MD paged to bedside at 0400 for J tube, informed that J tube fell out while patient was using the urinal. On arrival patient upright in bed, typing on iPad, NAD. J tube on bedside table. Suture remains in skin w/o active drainage from small visible incision noted on abdomen. Surgical dressing remains in place. Chief Resident informed. WCTM for now and discuss replacement this AM.

## 2019-05-02 NOTE — BRIEF OP NOTE
[facility]  Brief Operative Note    SUMMARY     Surgery Date: 5/2/2019     Surgeon(s) and Role:     * Everardo Smith MD - Primary     * Lencho Mancera MD - Resident - Assisting        Pre-op Diagnosis:  Injury of jejunum, initial encounter [S36.408A]    Post-op Diagnosis:  Post-Op Diagnosis Codes:     * Injury of jejunum, initial encounter [S36.408A]    Procedure(s) (LRB):  LAPAROTOMY, EXPLORATORY (N/A)  REVISION- Jejunostomy (N/A)    Anesthesia: General    Description of Procedure: exploratory laparotomy with closure of enterotomy (previous j-tube site) and replacement of j-tube    Description of the findings of the procedure: 12 F Witzel j-tube placed just distal to previous j-tube site    Estimated Blood Loss: minimal         Specimens:   Specimen (12h ago, onward)    None

## 2019-05-02 NOTE — PROGRESS NOTES
[facility]  General Surgery  Progress Note    Subjective:     History of Present Illness:  Brooks Canas is a 69 y.o. male with a h/o PAD (s/p Right fem-pop bypass with SVG in 2016; on Plavix), BPH, CKD stage III, HTN, hyperuricemia, and tobacco abuse who presents as a transfer from OSH for hematemesis.  He reports having dysphagia to solids that has progressively worsened over time.  He denies weight loss, appetite changes, fever, chills, nausea, vomiting, chest pain, shortness of breath.  He has been diagnosed with esophageal cancer this admission.  He is in need of enteral access for feeding.  He last took his Plavix 3 days ago.  His abdominal surgical history is significant for open appendectomy.    Post-Op Info:  Procedure(s) (LRB):  LAPAROTOMY, EXPLORATORY (N/A)  REVISION- Jejunostomy (N/A)   Day of Surgery     Interval History: J tube fell out overnight. Patient is in no distress and feels well.     Medications:  Continuous Infusions:   lactated ringers 100 mL/hr (05/02/19 0927)    pantoprazole 40 mg in dextrose 5 % 100 mL infusion (ready to mix system) 8 mg/hr (05/02/19 0510)     Scheduled Meds:   BESIFLOXACIN HCL 0.6% EYE DROPS (patient's own med)  1 drop Right Eye TID    DUREZOL (DIFLUPREDNATE 0.05%) EYE DROPS (patient's own med)  1 drop Right Eye BID    ILEVO (NEPAFENAC 0.3%) EYE DROPS (patient's own med)  1 drop Right Eye Daily     PRN Meds:sodium chloride, acetaminophen, HYDROmorphone, ondansetron, ondansetron, oxyCODONE-acetaminophen, oxyCODONE-acetaminophen, sodium chloride 0.9%, sodium chloride 0.9%, sodium chloride 0.9%, sodium chloride 0.9%     Review of patient's allergies indicates:  No Known Allergies  Objective:     Vital Signs (Most Recent):  Temp: 98.1 °F (36.7 °C) (05/02/19 0836)  Pulse: 71 (05/02/19 0900)  Resp: 16 (05/02/19 0900)  BP: (!) 155/72 (05/02/19 0900)  SpO2: 99 % (05/02/19 0900) Vital Signs (24h Range):  Temp:  [96.5 °F (35.8 °C)-98.3 °F (36.8 °C)]  98.1 °F (36.7 °C)  Pulse:  [52-97] 71  Resp:  [10-24] 16  SpO2:  [88 %-100 %] 99 %  BP: (126-168)/(55-99) 155/72     Weight: 81 kg (178 lb 9.2 oz)  Body mass index is 25.62 kg/m².    Intake/Output - Last 3 Shifts       04/30 0700 - 05/01 0659 05/01 0700 - 05/02 0659 05/02 0700 - 05/03 0659    P.O. 1400 50     I.V. (mL/kg) 2722 (33.7) 2900 (35.9) 300 (3.7)    NG/GT  60     Total Intake(mL/kg) 4122 (51.1) 3010 (37.3) 300 (3.7)    Urine (mL/kg/hr) 2550 (1.3) 1825 (0.9)     Stool 0 0     Total Output 2550 1825     Net +1572 +1185 +300           Urine Occurrence 4 x      Stool Occurrence 1 x 0 x           Physical Exam   Constitutional: He is oriented to person, place, and time. He appears well-developed and well-nourished. No distress.   HENT:   Head: Normocephalic and atraumatic.   Eyes: Conjunctivae and EOM are normal. No scleral icterus.   Neck: Normal range of motion.   Pulmonary/Chest: Effort normal and breath sounds normal. No respiratory distress.   Abdominal: Soft. Bowel sounds are normal. He exhibits no distension. There is no tenderness. There is no rebound and no guarding.   Musculoskeletal: Normal range of motion.   Neurological: He is alert and oriented to person, place, and time.   Skin: Skin is warm and dry. No rash noted. He is not diaphoretic. No erythema. No pallor.   Psychiatric: He has a normal mood and affect. His behavior is normal. Judgment and thought content normal.   Nursing note and vitals reviewed.      Significant Labs:  CBC:   Recent Labs   Lab 05/02/19  0305   WBC 7.14   RBC 2.87*   HGB 8.1*   HCT 25.5*   *   MCV 89   MCH 28.2   MCHC 31.8*     CMP:   Recent Labs   Lab 05/02/19  0305      CALCIUM 9.0   ALBUMIN 3.3*   PROT 5.9*      K 4.6   CO2 23      BUN 13   CREATININE 1.5*   ALKPHOS 78   ALT 9*   AST 16   BILITOT 0.6       Significant Diagnostics:  I have reviewed all pertinent imaging results/findings within the past 24 hours.    Assessment/Plan:     Esophageal  mass  POD 1 from open jejunostomy. J tube fell out over night.     -OR today for replacement of jejunostomy tube.   - Consent obtained.   - Please call if any questions, thank you.         Castillo Malcolm MD  General Surgery  [facility]

## 2019-05-03 ENCOUNTER — ANESTHESIA (OUTPATIENT)
Dept: ENDOSCOPY | Facility: HOSPITAL | Age: 69
DRG: 375 | End: 2019-05-03
Payer: MEDICARE

## 2019-05-03 LAB
ALBUMIN SERPL BCP-MCNC: 2.9 G/DL (ref 3.5–5.2)
ALP SERPL-CCNC: 70 U/L (ref 55–135)
ALT SERPL W/O P-5'-P-CCNC: 9 U/L (ref 10–44)
ANION GAP SERPL CALC-SCNC: 9 MMOL/L (ref 8–16)
AST SERPL-CCNC: 22 U/L (ref 10–40)
BASOPHILS # BLD AUTO: 0.01 K/UL (ref 0–0.2)
BASOPHILS # BLD AUTO: 0.02 K/UL (ref 0–0.2)
BASOPHILS NFR BLD: 0.2 % (ref 0–1.9)
BASOPHILS NFR BLD: 0.3 % (ref 0–1.9)
BILIRUB SERPL-MCNC: 0.5 MG/DL (ref 0.1–1)
BUN SERPL-MCNC: 11 MG/DL (ref 8–23)
CALCIUM SERPL-MCNC: 8.4 MG/DL (ref 8.7–10.5)
CHLORIDE SERPL-SCNC: 103 MMOL/L (ref 95–110)
CO2 SERPL-SCNC: 26 MMOL/L (ref 23–29)
CREAT SERPL-MCNC: 1.4 MG/DL (ref 0.5–1.4)
DIFFERENTIAL METHOD: ABNORMAL
DIFFERENTIAL METHOD: ABNORMAL
EOSINOPHIL # BLD AUTO: 0.1 K/UL (ref 0–0.5)
EOSINOPHIL # BLD AUTO: 0.1 K/UL (ref 0–0.5)
EOSINOPHIL NFR BLD: 1.1 % (ref 0–8)
EOSINOPHIL NFR BLD: 1.4 % (ref 0–8)
ERYTHROCYTE [DISTWIDTH] IN BLOOD BY AUTOMATED COUNT: 15.6 % (ref 11.5–14.5)
ERYTHROCYTE [DISTWIDTH] IN BLOOD BY AUTOMATED COUNT: 15.9 % (ref 11.5–14.5)
EST. GFR  (AFRICAN AMERICAN): 58 ML/MIN/1.73 M^2
EST. GFR  (NON AFRICAN AMERICAN): 50.2 ML/MIN/1.73 M^2
GLUCOSE SERPL-MCNC: 81 MG/DL (ref 70–110)
HCT VFR BLD AUTO: 23.9 % (ref 40–54)
HCT VFR BLD AUTO: 25.1 % (ref 40–54)
HGB BLD-MCNC: 7.5 G/DL (ref 14–18)
HGB BLD-MCNC: 7.8 G/DL (ref 14–18)
IMM GRANULOCYTES # BLD AUTO: 0.03 K/UL (ref 0–0.04)
IMM GRANULOCYTES # BLD AUTO: 0.05 K/UL (ref 0–0.04)
IMM GRANULOCYTES NFR BLD AUTO: 0.5 % (ref 0–0.5)
IMM GRANULOCYTES NFR BLD AUTO: 1 % (ref 0–0.5)
LYMPHOCYTES # BLD AUTO: 0.6 K/UL (ref 1–4.8)
LYMPHOCYTES # BLD AUTO: 0.9 K/UL (ref 1–4.8)
LYMPHOCYTES NFR BLD: 11.1 % (ref 18–48)
LYMPHOCYTES NFR BLD: 14.8 % (ref 18–48)
MAGNESIUM SERPL-MCNC: 1.8 MG/DL (ref 1.6–2.6)
MCH RBC QN AUTO: 27.9 PG (ref 27–31)
MCH RBC QN AUTO: 28.1 PG (ref 27–31)
MCHC RBC AUTO-ENTMCNC: 31.1 G/DL (ref 32–36)
MCHC RBC AUTO-ENTMCNC: 31.4 G/DL (ref 32–36)
MCV RBC AUTO: 90 FL (ref 82–98)
MCV RBC AUTO: 90 FL (ref 82–98)
MONOCYTES # BLD AUTO: 0.5 K/UL (ref 0.3–1)
MONOCYTES # BLD AUTO: 0.6 K/UL (ref 0.3–1)
MONOCYTES NFR BLD: 10.1 % (ref 4–15)
MONOCYTES NFR BLD: 9.2 % (ref 4–15)
NEUTROPHILS # BLD AUTO: 4.1 K/UL (ref 1.8–7.7)
NEUTROPHILS # BLD AUTO: 4.3 K/UL (ref 1.8–7.7)
NEUTROPHILS NFR BLD: 72.9 % (ref 38–73)
NEUTROPHILS NFR BLD: 77.4 % (ref 38–73)
NRBC BLD-RTO: 0 /100 WBC
NRBC BLD-RTO: 0 /100 WBC
PHOSPHATE SERPL-MCNC: 2.9 MG/DL (ref 2.7–4.5)
PLATELET # BLD AUTO: 141 K/UL (ref 150–350)
PLATELET # BLD AUTO: 146 K/UL (ref 150–350)
PMV BLD AUTO: 10.4 FL (ref 9.2–12.9)
PMV BLD AUTO: 11.2 FL (ref 9.2–12.9)
POTASSIUM SERPL-SCNC: 3.8 MMOL/L (ref 3.5–5.1)
PROT SERPL-MCNC: 5.3 G/DL (ref 6–8.4)
RBC # BLD AUTO: 2.67 M/UL (ref 4.6–6.2)
RBC # BLD AUTO: 2.8 M/UL (ref 4.6–6.2)
SODIUM SERPL-SCNC: 138 MMOL/L (ref 136–145)
WBC # BLD AUTO: 5.24 K/UL (ref 3.9–12.7)
WBC # BLD AUTO: 5.86 K/UL (ref 3.9–12.7)

## 2019-05-03 PROCEDURE — 84100 ASSAY OF PHOSPHORUS: CPT

## 2019-05-03 PROCEDURE — 83735 ASSAY OF MAGNESIUM: CPT

## 2019-05-03 PROCEDURE — 99233 PR SUBSEQUENT HOSPITAL CARE,LEVL III: ICD-10-PCS | Mod: ,,, | Performed by: HOSPITALIST

## 2019-05-03 PROCEDURE — 99233 SBSQ HOSP IP/OBS HIGH 50: CPT | Mod: ,,, | Performed by: HOSPITALIST

## 2019-05-03 PROCEDURE — 25000003 PHARM REV CODE 250: Performed by: NURSE ANESTHETIST, CERTIFIED REGISTERED

## 2019-05-03 PROCEDURE — 99233 PR SUBSEQUENT HOSPITAL CARE,LEVL III: ICD-10-PCS | Mod: ,,, | Performed by: INTERNAL MEDICINE

## 2019-05-03 PROCEDURE — 27000221 HC OXYGEN, UP TO 24 HOURS

## 2019-05-03 PROCEDURE — 25000003 PHARM REV CODE 250: Performed by: PHYSICIAN ASSISTANT

## 2019-05-03 PROCEDURE — 20600001 HC STEP DOWN PRIVATE ROOM

## 2019-05-03 PROCEDURE — 36415 COLL VENOUS BLD VENIPUNCTURE: CPT

## 2019-05-03 PROCEDURE — D9220A PRA ANESTHESIA: Mod: CRNA,,, | Performed by: NURSE ANESTHETIST, CERTIFIED REGISTERED

## 2019-05-03 PROCEDURE — D9220A PRA ANESTHESIA: ICD-10-PCS | Mod: CRNA,,, | Performed by: NURSE ANESTHETIST, CERTIFIED REGISTERED

## 2019-05-03 PROCEDURE — 37000009 HC ANESTHESIA EA ADD 15 MINS: Performed by: INTERNAL MEDICINE

## 2019-05-03 PROCEDURE — 63600175 PHARM REV CODE 636 W HCPCS: Performed by: NURSE ANESTHETIST, CERTIFIED REGISTERED

## 2019-05-03 PROCEDURE — 25000003 PHARM REV CODE 250: Performed by: STUDENT IN AN ORGANIZED HEALTH CARE EDUCATION/TRAINING PROGRAM

## 2019-05-03 PROCEDURE — 99233 SBSQ HOSP IP/OBS HIGH 50: CPT | Mod: ,,, | Performed by: INTERNAL MEDICINE

## 2019-05-03 PROCEDURE — 43259 EGD US EXAM DUODENUM/JEJUNUM: CPT | Performed by: INTERNAL MEDICINE

## 2019-05-03 PROCEDURE — D9220A PRA ANESTHESIA: Mod: ANES,,, | Performed by: ANESTHESIOLOGY

## 2019-05-03 PROCEDURE — 85025 COMPLETE CBC W/AUTO DIFF WBC: CPT | Mod: 91

## 2019-05-03 PROCEDURE — 25000003 PHARM REV CODE 250: Performed by: HOSPITALIST

## 2019-05-03 PROCEDURE — D9220A PRA ANESTHESIA: ICD-10-PCS | Mod: ANES,,, | Performed by: ANESTHESIOLOGY

## 2019-05-03 PROCEDURE — 80053 COMPREHEN METABOLIC PANEL: CPT

## 2019-05-03 PROCEDURE — 94761 N-INVAS EAR/PLS OXIMETRY MLT: CPT

## 2019-05-03 PROCEDURE — 43259 EGD US EXAM DUODENUM/JEJUNUM: CPT | Mod: ,,, | Performed by: INTERNAL MEDICINE

## 2019-05-03 PROCEDURE — 43259 PR ENDOSCOPIC ULTRASOUND EXAM: ICD-10-PCS | Mod: ,,, | Performed by: INTERNAL MEDICINE

## 2019-05-03 PROCEDURE — 37000008 HC ANESTHESIA 1ST 15 MINUTES: Performed by: INTERNAL MEDICINE

## 2019-05-03 RX ORDER — HYDRALAZINE HYDROCHLORIDE 25 MG/1
25 TABLET, FILM COATED ORAL EVERY 8 HOURS PRN
Status: DISCONTINUED | OUTPATIENT
Start: 2019-05-03 | End: 2019-05-07 | Stop reason: HOSPADM

## 2019-05-03 RX ORDER — TAMSULOSIN HYDROCHLORIDE 0.4 MG/1
0.4 CAPSULE ORAL DAILY
Status: DISCONTINUED | OUTPATIENT
Start: 2019-05-03 | End: 2019-05-07 | Stop reason: HOSPADM

## 2019-05-03 RX ORDER — KETAMINE HCL IN 0.9 % NACL 50 MG/5 ML
SYRINGE (ML) INTRAVENOUS
Status: DISCONTINUED | OUTPATIENT
Start: 2019-05-03 | End: 2019-05-03

## 2019-05-03 RX ORDER — ESMOLOL HYDROCHLORIDE 10 MG/ML
INJECTION INTRAVENOUS
Status: DISCONTINUED | OUTPATIENT
Start: 2019-05-03 | End: 2019-05-03

## 2019-05-03 RX ORDER — PROPOFOL 10 MG/ML
VIAL (ML) INTRAVENOUS
Status: DISCONTINUED | OUTPATIENT
Start: 2019-05-03 | End: 2019-05-03

## 2019-05-03 RX ORDER — LIDOCAINE HCL/PF 100 MG/5ML
SYRINGE (ML) INTRAVENOUS
Status: DISCONTINUED | OUTPATIENT
Start: 2019-05-03 | End: 2019-05-03

## 2019-05-03 RX ORDER — SODIUM CHLORIDE 0.9 % (FLUSH) 0.9 %
3 SYRINGE (ML) INJECTION
Status: DISCONTINUED | OUTPATIENT
Start: 2019-05-03 | End: 2019-05-03 | Stop reason: HOSPADM

## 2019-05-03 RX ORDER — PROPOFOL 10 MG/ML
VIAL (ML) INTRAVENOUS CONTINUOUS PRN
Status: DISCONTINUED | OUTPATIENT
Start: 2019-05-03 | End: 2019-05-03

## 2019-05-03 RX ADMIN — ESMOLOL HYDROCHLORIDE 10 MG: 10 INJECTION INTRAVENOUS at 01:05

## 2019-05-03 RX ADMIN — Medication 25 MG: at 01:05

## 2019-05-03 RX ADMIN — ESMOLOL HYDROCHLORIDE 10 MG: 10 INJECTION INTRAVENOUS at 12:05

## 2019-05-03 RX ADMIN — PROPOFOL 50 MG: 10 INJECTION, EMULSION INTRAVENOUS at 01:05

## 2019-05-03 RX ADMIN — PANTOPRAZOLE SODIUM 40 MG: 40 TABLET, DELAYED RELEASE ORAL at 05:05

## 2019-05-03 RX ADMIN — TAMSULOSIN HYDROCHLORIDE 0.4 MG: 0.4 CAPSULE ORAL at 10:05

## 2019-05-03 RX ADMIN — HYDRALAZINE HYDROCHLORIDE 25 MG: 25 TABLET, FILM COATED ORAL at 10:05

## 2019-05-03 RX ADMIN — PROPOFOL 150 MCG/KG/MIN: 10 INJECTION, EMULSION INTRAVENOUS at 01:05

## 2019-05-03 RX ADMIN — SODIUM CHLORIDE, SODIUM LACTATE, POTASSIUM CHLORIDE, AND CALCIUM CHLORIDE 100 ML/HR: 600; 310; 30; 20 INJECTION, SOLUTION INTRAVENOUS at 02:05

## 2019-05-03 RX ADMIN — SODIUM CHLORIDE, SODIUM LACTATE, POTASSIUM CHLORIDE, AND CALCIUM CHLORIDE 100 ML/HR: 600; 310; 30; 20 INJECTION, SOLUTION INTRAVENOUS at 08:05

## 2019-05-03 RX ADMIN — LIDOCAINE HYDROCHLORIDE 100 MG: 20 INJECTION, SOLUTION INTRAVENOUS at 01:05

## 2019-05-03 RX ADMIN — OXYCODONE HYDROCHLORIDE AND ACETAMINOPHEN 1 TABLET: 5; 325 TABLET ORAL at 02:05

## 2019-05-03 NOTE — ASSESSMENT & PLAN NOTE
Initial open jejunostomy placed 05/01/19 which fell out on POD 0 and replaced POD 1 05/02/19. Doing well now without any issues. Would like to eat.     -EGD versus EUS today  -Can advance tube feeds starting at 10cc an hour and advancing every 4 hours to goal.   -Okay for PO diet. Advance as tolerated.   - Please call if any questions, thank you.

## 2019-05-03 NOTE — ANESTHESIA POSTPROCEDURE EVALUATION
Anesthesia Post Evaluation    Patient: Brooks Canas    Procedure(s) Performed: Procedure(s) (LRB):  ULTRASOUND, UPPER GI TRACT, ENDOSCOPIC (N/A)    Final Anesthesia Type: general  Patient location during evaluation: PACU  Patient participation: Yes- Able to Participate  Level of consciousness: awake and alert and oriented  Post-procedure vital signs: reviewed and stable  Pain management: adequate  Airway patency: patent  PONV status at discharge: No PONV  Anesthetic complications: no      Cardiovascular status: hemodynamically stable  Respiratory status: unassisted  Hydration status: euvolemic  Follow-up not needed.          Vitals Value Taken Time   /55 5/3/2019  2:31 PM   Temp 37.7 °C (99.8 °F) 5/3/2019  1:19 PM   Pulse 89 5/3/2019  3:04 PM   Resp 13 5/3/2019  1:48 PM   SpO2 96 % 5/3/2019  2:36 PM   Vitals shown include unvalidated device data.      No case tracking events are documented in the log.      Pain/Saji Score: Pain Rating Prior to Med Admin: 4 (5/3/2019  2:37 PM)  Pain Rating Post Med Admin: 2 (5/3/2019 12:34 AM)  Saji Score: 9 (5/2/2019 10:15 AM)

## 2019-05-03 NOTE — SUBJECTIVE & OBJECTIVE
Interval History: No acute events overnight. J tube replaced without issue.     Medications:  Continuous Infusions:   lactated ringers 100 mL/hr (05/03/19 0840)     Scheduled Meds:   BESIFLOXACIN HCL 0.6% EYE DROPS (patient's own med)  1 drop Right Eye TID    DUREZOL (DIFLUPREDNATE 0.05%) EYE DROPS (patient's own med)  1 drop Right Eye BID    ILEVO (NEPAFENAC 0.3%) EYE DROPS (patient's own med)  1 drop Right Eye Daily    pantoprazole  40 mg Oral BID AC     PRN Meds:sodium chloride, acetaminophen, ondansetron, ondansetron, oxyCODONE-acetaminophen, oxyCODONE-acetaminophen, sodium chloride 0.9%, sodium chloride 0.9%, sodium chloride 0.9%     Review of patient's allergies indicates:  No Known Allergies  Objective:     Vital Signs (Most Recent):  Temp: 98.6 °F (37 °C) (05/03/19 0837)  Pulse: 82 (05/03/19 0837)  Resp: 18 (05/03/19 0837)  BP: (!) 144/65 (05/03/19 0837)  SpO2: 98 % (05/03/19 0837) Vital Signs (24h Range):  Temp:  [97 °F (36.1 °C)-98.8 °F (37.1 °C)] 98.6 °F (37 °C)  Pulse:  [63-96] 82  Resp:  [15-18] 18  SpO2:  [93 %-99 %] 98 %  BP: (127-157)/(60-92) 144/65     Weight: 81 kg (178 lb 9.2 oz)  Body mass index is 25.62 kg/m².    Intake/Output - Last 3 Shifts       05/01 0700 - 05/02 0659 05/02 0700 - 05/03 0659 05/03 0700 - 05/04 0659    P.O. 50 30     I.V. (mL/kg) 2900 (35.9) 2918.3 (36.2)     NG/GT 60      Total Intake(mL/kg) 3010 (37.3) 2948.3 (36.5)     Urine (mL/kg/hr) 1825 (0.9) 1600 (0.8) 250 (0.9)    Stool 0 0     Total Output 1825 1600 250    Net +1185 +1348.3 -250           Stool Occurrence 0 x 0 x           Physical Exam   Constitutional: He is oriented to person, place, and time. He appears well-developed and well-nourished. No distress.   HENT:   Head: Normocephalic and atraumatic.   Eyes: Conjunctivae and EOM are normal. No scleral icterus.   Neck: Normal range of motion.   Pulmonary/Chest: Effort normal and breath sounds normal. No respiratory distress.   Abdominal: Soft. Bowel sounds are  normal. He exhibits no distension. There is tenderness (appropriate post op). There is no rebound and no guarding.   J tube intact. Midline incision clean, dry, and intact.    Musculoskeletal: Normal range of motion.   Neurological: He is alert and oriented to person, place, and time.   Skin: Skin is warm and dry. No rash noted. He is not diaphoretic. No erythema. No pallor.   Psychiatric: He has a normal mood and affect. His behavior is normal. Judgment and thought content normal.   Nursing note and vitals reviewed.      Significant Labs:  CBC:   Recent Labs   Lab 05/03/19  0326   WBC 5.86   RBC 2.67*   HGB 7.5*   HCT 23.9*   *   MCV 90   MCH 28.1   MCHC 31.4*     CMP:   Recent Labs   Lab 05/03/19 0326   GLU 81   CALCIUM 8.4*   ALBUMIN 2.9*   PROT 5.3*      K 3.8   CO2 26      BUN 11   CREATININE 1.4   ALKPHOS 70   ALT 9*   AST 22   BILITOT 0.5       Significant Diagnostics:  I have reviewed all pertinent imaging results/findings within the past 24 hours.

## 2019-05-03 NOTE — TRANSFER OF CARE
"Anesthesia Transfer of Care Note    Patient: Brooks Canas    Procedure(s) Performed: Procedure(s) (LRB):  ULTRASOUND, UPPER GI TRACT, ENDOSCOPIC (N/A)    Patient location: PACU    Anesthesia Type: general    Transport from OR: Transported from OR on room air with adequate spontaneous ventilation    Post pain: adequate analgesia    Post assessment: no apparent anesthetic complications    Post vital signs: stable    Level of consciousness: awake, alert and oriented    Nausea/Vomiting: no nausea/vomiting    Complications: none    Transfer of care protocol was followed      Last vitals:   Visit Vitals  BP (!) 186/71 (BP Location: Right arm, Patient Position: Lying)   Pulse 98   Temp 37.9 °C (100.2 °F) (Oral)   Resp 14   Ht 5' 10" (1.778 m)   Wt 81 kg (178 lb 9.2 oz)   SpO2 98%   BMI 25.62 kg/m²     "

## 2019-05-03 NOTE — [employer] PATIENT INSTRUCTIONS
Discharge Summary/Instructions after an Endoscopic Procedure  Patient Name: Brooks Canas  Patient MRN: [id number]  Patient YOB: 1950  Friday, May 03, 2019  Pacheco Ferreira MD  RESTRICTIONS:  During your procedure today, you received medications for sedation.  These   medications may affect your judgment, balance and coordination.  Therefore,   for 24 hours, you have the following restrictions:   - DO NOT drive a car, operate machinery, make legal/financial decisions,   sign important papers or drink alcohol.    ACTIVITY:  Today: no heavy lifting, straining or running due to procedural   sedation/anesthesia.  The following day: return to full activity including work.  DIET:  Eat and drink normally unless instructed otherwise.     TREATMENT FOR COMMON SIDE EFFECTS:  - Mild abdominal pain, nausea, belching, bloating or excessive gas:  rest,   eat lightly and use a heating pad.  - Sore Throat: treat with throat lozenges and/or gargle with warm salt   water.  - Because air was used during the procedure, expelling large amounts of air   from your rectum or belching is normal.  - If a bowel prep was taken, you may not have a bowel movement for 1-3 days.    This is normal.  SYMPTOMS TO WATCH FOR AND REPORT TO YOUR PHYSICIAN:  1. Abdominal pain or bloating, other than gas cramps.  2. Chest pain.  3. Back pain.  4. Signs of infection such as: chills or fever occurring within 24 hours   after the procedure.  5. Rectal bleeding, which would show as bright red, maroon, or black stools.   (A tablespoon of blood from the rectum is not serious, especially if   hemorrhoids are present.)  6. Vomiting.  7. Weakness or dizziness.  GO DIRECTLY TO THE NEAREST EMERGENCY ROOM IF YOU HAVE ANY OF THE FOLLOWING:      Difficulty breathing              Chills and/or fever over 101 F   Persistent vomiting and/or vomiting blood   Severe abdominal pain   Severe chest pain   Black, tarry stools   Bleeding- more than one  tablespoon   Any other symptom or condition that you feel may need urgent attention  Your doctor recommends these additional instructions:  If any biopsies were taken, your doctors clinic will contact you in 1 to 2   weeks with any results.  - Return patient to hospital [facility] for ongoing care.   - Follow-up care of esophageal cancer as per oncology team.  For questions, problems or results please call your physician - Pacheco Ferreira MD at Work:  [phone].  [facility], EMERGENCY ROOM PHONE NUMBER: [phone]  IF A COMPLICATION OR EMERGENCY SITUATION ARISES AND YOU ARE UNABLE TO REACH   YOUR PHYSICIAN - GO DIRECTLY TO THE EMERGENCY ROOM.  Pacheco Ferreira MD  5/3/2019 1:27:05 PM  This report has been verified and signed electronically.  [employer]

## 2019-05-03 NOTE — PROGRESS NOTES
[facility]  General Surgery  Progress Note    Subjective:     History of Present Illness:  Brooks Canas is a 69 y.o. male with a h/o PAD (s/p Right fem-pop bypass with SVG in 2016; on Plavix), BPH, CKD stage III, HTN, hyperuricemia, and tobacco abuse who presents as a transfer from OSH for hematemesis.  He reports having dysphagia to solids that has progressively worsened over time.  He denies weight loss, appetite changes, fever, chills, nausea, vomiting, chest pain, shortness of breath.  He has been diagnosed with esophageal cancer this admission.  He is in need of enteral access for feeding.  He last took his Plavix 3 days ago.  His abdominal surgical history is significant for open appendectomy.    Post-Op Info:  Procedure(s) (LRB):  LAPAROTOMY, EXPLORATORY (N/A)  REVISION- Jejunostomy (N/A)   1 Day Post-Op     Interval History: No acute events overnight. J tube replaced without issue.     Medications:  Continuous Infusions:   lactated ringers 100 mL/hr (05/03/19 0840)     Scheduled Meds:   BESIFLOXACIN HCL 0.6% EYE DROPS (patient's own med)  1 drop Right Eye TID    DUREZOL (DIFLUPREDNATE 0.05%) EYE DROPS (patient's own med)  1 drop Right Eye BID    ILEVO (NEPAFENAC 0.3%) EYE DROPS (patient's own med)  1 drop Right Eye Daily    pantoprazole  40 mg Oral BID AC     PRN Meds:sodium chloride, acetaminophen, ondansetron, ondansetron, oxyCODONE-acetaminophen, oxyCODONE-acetaminophen, sodium chloride 0.9%, sodium chloride 0.9%, sodium chloride 0.9%     Review of patient's allergies indicates:  No Known Allergies  Objective:     Vital Signs (Most Recent):  Temp: 98.6 °F (37 °C) (05/03/19 0837)  Pulse: 82 (05/03/19 0837)  Resp: 18 (05/03/19 0837)  BP: (!) 144/65 (05/03/19 0837)  SpO2: 98 % (05/03/19 0837) Vital Signs (24h Range):  Temp:  [97 °F (36.1 °C)-98.8 °F (37.1 °C)] 98.6 °F (37 °C)  Pulse:  [63-96] 82  Resp:  [15-18] 18  SpO2:  [93 %-99 %] 98 %  BP: (127-157)/(60-92) 144/65      Weight: 81 kg (178 lb 9.2 oz)  Body mass index is 25.62 kg/m².    Intake/Output - Last 3 Shifts       05/01 0700 - 05/02 0659 05/02 0700 - 05/03 0659 05/03 0700 - 05/04 0659    P.O. 50 30     I.V. (mL/kg) 2900 (35.9) 2918.3 (36.2)     NG/GT 60      Total Intake(mL/kg) 3010 (37.3) 2948.3 (36.5)     Urine (mL/kg/hr) 1825 (0.9) 1600 (0.8) 250 (0.9)    Stool 0 0     Total Output 1825 1600 250    Net +1185 +1348.3 -250           Stool Occurrence 0 x 0 x           Physical Exam   Constitutional: He is oriented to person, place, and time. He appears well-developed and well-nourished. No distress.   HENT:   Head: Normocephalic and atraumatic.   Eyes: Conjunctivae and EOM are normal. No scleral icterus.   Neck: Normal range of motion.   Pulmonary/Chest: Effort normal and breath sounds normal. No respiratory distress.   Abdominal: Soft. Bowel sounds are normal. He exhibits no distension. There is tenderness (appropriate post op). There is no rebound and no guarding.   J tube intact. Midline incision clean, dry, and intact.    Musculoskeletal: Normal range of motion.   Neurological: He is alert and oriented to person, place, and time.   Skin: Skin is warm and dry. No rash noted. He is not diaphoretic. No erythema. No pallor.   Psychiatric: He has a normal mood and affect. His behavior is normal. Judgment and thought content normal.   Nursing note and vitals reviewed.      Significant Labs:  CBC:   Recent Labs   Lab 05/03/19  0326   WBC 5.86   RBC 2.67*   HGB 7.5*   HCT 23.9*   *   MCV 90   MCH 28.1   MCHC 31.4*     CMP:   Recent Labs   Lab 05/03/19  0326   GLU 81   CALCIUM 8.4*   ALBUMIN 2.9*   PROT 5.3*      K 3.8   CO2 26      BUN 11   CREATININE 1.4   ALKPHOS 70   ALT 9*   AST 22   BILITOT 0.5       Significant Diagnostics:  I have reviewed all pertinent imaging results/findings within the past 24 hours.    Assessment/Plan:     Esophageal mass  Initial open jejunostomy placed 05/01/19 which fell out  on POD 0 and replaced POD 1 05/02/19. Doing well now without any issues. Would like to eat.     -EGD versus EUS today  -Can advance tube feeds starting at 10cc an hour and advancing every 4 hours to goal.   -Okay for PO diet. Advance as tolerated.   - Please call if any questions, thank you.         Castillo Malcolm MD  General Surgery  [facility]

## 2019-05-03 NOTE — PLAN OF CARE
Problem: Adult Inpatient Plan of Care  Goal: Plan of Care Review  Outcome: Ongoing (interventions implemented as appropriate)  Pt AAO, VSS. NSR on telemetry monitor. Pain medication given PRN.  LR infusing @ 100cc/hr. Remains NPO. J tube flushed q6h with 30cc water.  Fall risk precautions initiated. Pt free of falls or injuries.

## 2019-05-03 NOTE — PLAN OF CARE
Problem: Adult Inpatient Plan of Care  Goal: Plan of Care Review  Outcome: Ongoing (interventions implemented as appropriate)  Pt AAOx4, VSS, afebrile.  C/o pain with relief to prn pain medications.  LR infusing @ 100cc/hr.  NPO for EGD. J tube flushed q6h with 30cc water.  Fall risk precautions initiated.  Pt in lowest bed position setting, lighting adjusted, pt to wear nonskid socks when ambulating, side rails up x2.  Pt remain free from falls during shift.  Pt verbalize understanding to call when needed assistance. Call light within reach.  Will continue to monitor.

## 2019-05-04 LAB
ALBUMIN SERPL BCP-MCNC: 2.6 G/DL (ref 3.5–5.2)
ALP SERPL-CCNC: 67 U/L (ref 55–135)
ALT SERPL W/O P-5'-P-CCNC: 8 U/L (ref 10–44)
ANION GAP SERPL CALC-SCNC: 10 MMOL/L (ref 8–16)
AST SERPL-CCNC: 16 U/L (ref 10–40)
BASOPHILS # BLD AUTO: 0.01 K/UL (ref 0–0.2)
BASOPHILS # BLD AUTO: 0.02 K/UL (ref 0–0.2)
BASOPHILS NFR BLD: 0.2 % (ref 0–1.9)
BASOPHILS NFR BLD: 0.5 % (ref 0–1.9)
BILIRUB SERPL-MCNC: 0.5 MG/DL (ref 0.1–1)
BUN SERPL-MCNC: 11 MG/DL (ref 8–23)
CALCIUM SERPL-MCNC: 8.6 MG/DL (ref 8.7–10.5)
CHLORIDE SERPL-SCNC: 104 MMOL/L (ref 95–110)
CO2 SERPL-SCNC: 25 MMOL/L (ref 23–29)
CREAT SERPL-MCNC: 1.3 MG/DL (ref 0.5–1.4)
DIFFERENTIAL METHOD: ABNORMAL
DIFFERENTIAL METHOD: ABNORMAL
EOSINOPHIL # BLD AUTO: 0.1 K/UL (ref 0–0.5)
EOSINOPHIL # BLD AUTO: 0.1 K/UL (ref 0–0.5)
EOSINOPHIL NFR BLD: 2.3 % (ref 0–8)
EOSINOPHIL NFR BLD: 2.8 % (ref 0–8)
ERYTHROCYTE [DISTWIDTH] IN BLOOD BY AUTOMATED COUNT: 15.5 % (ref 11.5–14.5)
ERYTHROCYTE [DISTWIDTH] IN BLOOD BY AUTOMATED COUNT: 15.5 % (ref 11.5–14.5)
EST. GFR  (AFRICAN AMERICAN): >60 ML/MIN/1.73 M^2
EST. GFR  (NON AFRICAN AMERICAN): 54.9 ML/MIN/1.73 M^2
GLUCOSE SERPL-MCNC: 123 MG/DL (ref 70–110)
HCT VFR BLD AUTO: 22.7 % (ref 40–54)
HCT VFR BLD AUTO: 24.9 % (ref 40–54)
HGB BLD-MCNC: 7.5 G/DL (ref 14–18)
HGB BLD-MCNC: 7.9 G/DL (ref 14–18)
IMM GRANULOCYTES # BLD AUTO: 0.03 K/UL (ref 0–0.04)
IMM GRANULOCYTES # BLD AUTO: 0.04 K/UL (ref 0–0.04)
IMM GRANULOCYTES NFR BLD AUTO: 0.7 % (ref 0–0.5)
IMM GRANULOCYTES NFR BLD AUTO: 0.9 % (ref 0–0.5)
LYMPHOCYTES # BLD AUTO: 0.4 K/UL (ref 1–4.8)
LYMPHOCYTES # BLD AUTO: 0.6 K/UL (ref 1–4.8)
LYMPHOCYTES NFR BLD: 13.6 % (ref 18–48)
LYMPHOCYTES NFR BLD: 9.7 % (ref 18–48)
MAGNESIUM SERPL-MCNC: 1.6 MG/DL (ref 1.6–2.6)
MCH RBC QN AUTO: 28.1 PG (ref 27–31)
MCH RBC QN AUTO: 28.1 PG (ref 27–31)
MCHC RBC AUTO-ENTMCNC: 31.7 G/DL (ref 32–36)
MCHC RBC AUTO-ENTMCNC: 33 G/DL (ref 32–36)
MCV RBC AUTO: 85 FL (ref 82–98)
MCV RBC AUTO: 89 FL (ref 82–98)
MONOCYTES # BLD AUTO: 0.5 K/UL (ref 0.3–1)
MONOCYTES # BLD AUTO: 0.5 K/UL (ref 0.3–1)
MONOCYTES NFR BLD: 10.9 % (ref 4–15)
MONOCYTES NFR BLD: 11.6 % (ref 4–15)
NEUTROPHILS # BLD AUTO: 3.2 K/UL (ref 1.8–7.7)
NEUTROPHILS # BLD AUTO: 3.2 K/UL (ref 1.8–7.7)
NEUTROPHILS NFR BLD: 72.3 % (ref 38–73)
NEUTROPHILS NFR BLD: 74.5 % (ref 38–73)
NRBC BLD-RTO: 0 /100 WBC
NRBC BLD-RTO: 0 /100 WBC
PHOSPHATE SERPL-MCNC: 2.4 MG/DL (ref 2.7–4.5)
PLATELET # BLD AUTO: 145 K/UL (ref 150–350)
PLATELET # BLD AUTO: 147 K/UL (ref 150–350)
PMV BLD AUTO: 10.3 FL (ref 9.2–12.9)
PMV BLD AUTO: 11.3 FL (ref 9.2–12.9)
POTASSIUM SERPL-SCNC: 3.5 MMOL/L (ref 3.5–5.1)
PROT SERPL-MCNC: 5.3 G/DL (ref 6–8.4)
RBC # BLD AUTO: 2.67 M/UL (ref 4.6–6.2)
RBC # BLD AUTO: 2.81 M/UL (ref 4.6–6.2)
SODIUM SERPL-SCNC: 139 MMOL/L (ref 136–145)
WBC # BLD AUTO: 4.23 K/UL (ref 3.9–12.7)
WBC # BLD AUTO: 4.42 K/UL (ref 3.9–12.7)

## 2019-05-04 PROCEDURE — 99233 SBSQ HOSP IP/OBS HIGH 50: CPT | Mod: ,,, | Performed by: HOSPITALIST

## 2019-05-04 PROCEDURE — 20600001 HC STEP DOWN PRIVATE ROOM

## 2019-05-04 PROCEDURE — 99233 PR SUBSEQUENT HOSPITAL CARE,LEVL III: ICD-10-PCS | Mod: ,,, | Performed by: HOSPITALIST

## 2019-05-04 PROCEDURE — 25000003 PHARM REV CODE 250: Performed by: STUDENT IN AN ORGANIZED HEALTH CARE EDUCATION/TRAINING PROGRAM

## 2019-05-04 PROCEDURE — 84100 ASSAY OF PHOSPHORUS: CPT

## 2019-05-04 PROCEDURE — 85025 COMPLETE CBC W/AUTO DIFF WBC: CPT

## 2019-05-04 PROCEDURE — 83735 ASSAY OF MAGNESIUM: CPT

## 2019-05-04 PROCEDURE — 25000003 PHARM REV CODE 250: Performed by: HOSPITALIST

## 2019-05-04 PROCEDURE — 36415 COLL VENOUS BLD VENIPUNCTURE: CPT

## 2019-05-04 PROCEDURE — 25000003 PHARM REV CODE 250: Performed by: PHYSICIAN ASSISTANT

## 2019-05-04 PROCEDURE — 80053 COMPREHEN METABOLIC PANEL: CPT

## 2019-05-04 RX ADMIN — SODIUM CHLORIDE, SODIUM LACTATE, POTASSIUM CHLORIDE, AND CALCIUM CHLORIDE 100 ML/HR: 600; 310; 30; 20 INJECTION, SOLUTION INTRAVENOUS at 11:05

## 2019-05-04 RX ADMIN — PANTOPRAZOLE SODIUM 40 MG: 40 TABLET, DELAYED RELEASE ORAL at 04:05

## 2019-05-04 RX ADMIN — PANTOPRAZOLE SODIUM 40 MG: 40 TABLET, DELAYED RELEASE ORAL at 06:05

## 2019-05-04 RX ADMIN — TAMSULOSIN HYDROCHLORIDE 0.4 MG: 0.4 CAPSULE ORAL at 09:05

## 2019-05-04 RX ADMIN — OXYCODONE HYDROCHLORIDE AND ACETAMINOPHEN 1 TABLET: 5; 325 TABLET ORAL at 09:05

## 2019-05-04 NOTE — SUBJECTIVE & OBJECTIVE
Interval History: No acute events overnight. Tolerating diet and tube feeds. No nausea and vomiting. Pain well controlled.     Medications:  Continuous Infusions:   lactated ringers 100 mL/hr (05/04/19 0423)     Scheduled Meds:   DUREZOL (DIFLUPREDNATE 0.05%) EYE DROPS (patient's own med)  1 drop Right Eye BID    ILEVO (NEPAFENAC 0.3%) EYE DROPS (patient's own med)  1 drop Right Eye Daily    pantoprazole  40 mg Oral BID AC    tamsulosin  0.4 mg Oral Daily     PRN Meds:sodium chloride, acetaminophen, hydrALAZINE, ondansetron, ondansetron, oxyCODONE-acetaminophen, oxyCODONE-acetaminophen, sodium chloride 0.9%, sodium chloride 0.9%, sodium chloride 0.9%     Review of patient's allergies indicates:  No Known Allergies  Objective:     Vital Signs (Most Recent):  Temp: 98.9 °F (37.2 °C) (05/04/19 0717)  Pulse: 90 (05/04/19 0733)  Resp: 18 (05/04/19 0717)  BP: (!) 115/56 (05/04/19 0717)  SpO2: (!) 90 % (05/04/19 0717) Vital Signs (24h Range):  Temp:  [98.4 °F (36.9 °C)-100.2 °F (37.9 °C)] 98.9 °F (37.2 °C)  Pulse:  [] 90  Resp:  [10-18] 18  SpO2:  [90 %-98 %] 90 %  BP: (115-186)/(56-74) 115/56     Weight: 81 kg (178 lb 9.2 oz)  Body mass index is 25.62 kg/m².    Intake/Output - Last 3 Shifts       05/02 0700 - 05/03 0659 05/03 0700 - 05/04 0659 05/04 0700 - 05/05 0659    P.O. 30 500     I.V. (mL/kg) 2918.3 (36.2) 2186.7 (27.1)     NG/GT  258 228    Total Intake(mL/kg) 2948.3 (36.5) 2944.7 (36.5) 228 (2.8)    Urine (mL/kg/hr) 1600 (0.8) 2200 (1.1)     Stool 0 0     Total Output 1600 2200     Net +1348.3 +744.7 +228           Stool Occurrence 0 x 0 x 0 x          Physical Exam   Constitutional: He is oriented to person, place, and time. He appears well-developed and well-nourished. No distress.   HENT:   Head: Normocephalic and atraumatic.   Eyes: Conjunctivae and EOM are normal. No scleral icterus.   Neck: Normal range of motion.   Pulmonary/Chest: Effort normal and breath sounds normal. No respiratory distress.    Abdominal: Soft. Bowel sounds are normal. He exhibits no distension. There is tenderness (appropriate post op). There is no rebound and no guarding.   J tube intact. Midline incision clean, dry, and intact.    Musculoskeletal: Normal range of motion.   Neurological: He is alert and oriented to person, place, and time.   Skin: Skin is warm and dry. No rash noted. He is not diaphoretic. No erythema. No pallor.   Psychiatric: He has a normal mood and affect. His behavior is normal. Judgment and thought content normal.   Nursing note and vitals reviewed.      Significant Labs:  CBC:   Recent Labs   Lab 05/04/19  0511   WBC 4.23   RBC 2.81*   HGB 7.9*   HCT 24.9*   *   MCV 89   MCH 28.1   MCHC 31.7*     CMP:   Recent Labs   Lab 05/04/19  0511   *   CALCIUM 8.6*   ALBUMIN 2.6*   PROT 5.3*      K 3.5   CO2 25      BUN 11   CREATININE 1.3   ALKPHOS 67   ALT 8*   AST 16   BILITOT 0.5       Significant Diagnostics:  I have reviewed all pertinent imaging results/findings within the past 24 hours.

## 2019-05-04 NOTE — PLAN OF CARE
Problem: Adult Inpatient Plan of Care  Goal: Plan of Care Review  Outcome: Ongoing (interventions implemented as appropriate)  Plan of care reviewed with patient. Verbalizes understanding. AAOx4. VSS. Patient denies any pain at this time. Patient tolerating diet and tube feedings which are infusing at the goal 40ml/hr. Patient ambulated in roy with x1 assist; remains free of any falls or trauma. While ambulating patient complained of slight leg pain bu stated that it was normal referring to history of peripheral artery disease. No BM noted this shift; flatus present. Patient has no other concerns at this time. Call light within reach. WCTM.

## 2019-05-04 NOTE — PROGRESS NOTES
Hospital Medicine  Progress note    Team: [facility] R Griffin Garner MD  Admit Date: 4/26/2019  OLIVIA 5/5/2019  Length of Stay:  LOS: 7 days   Code status: Full Code    Principal Problem:  Acute upper GI bleed    Overview    Interval hx:  Patient seen and examined at bedside, EGD performed today for staging purposes, determined tumor is T3 N1, MRI brain and bone scan ordered to rule out distant metastatic disease.       ROS     Constitutional: Negative for fever.   Respiratory: Negative for shortness of breath.    Cardiovascular: Negative for chest pain, palpitations and leg swelling.   Gastrointestinal: Negative for abdominal pain, blood in stool, constipation, diarrhea, nausea and vomiting.   Neurological: Negative for dizziness and syncope.   Psychiatric/Behavioral: Negative for agitation, behavioral problems and confusion.          PEx  Temp:  [98.2 °F (36.8 °C)-100.2 °F (37.9 °C)]   Pulse:  []   Resp:  [10-18]   BP: (136-186)/(63-92)   SpO2:  [93 %-98 %]     Intake/Output Summary (Last 24 hours) at 5/3/2019 2003  Last data filed at 5/3/2019 1838  Gross per 24 hour   Intake 2330 ml   Output 2050 ml   Net 280 ml     Constitutional: He is oriented to person, place, and time. He appears well-developed and well-nourished. No distress.   HENT:   Head: Normocephalic and atraumatic.   Eyes: Conjunctivae and EOM are normal. No scleral icterus.   Neck: Normal range of motion.   Pulmonary/Chest: Effort normal and breath sounds normal. No respiratory distress.   Abdominal: Soft. Bowel sounds are normal. He exhibits no distension. There is no tenderness. There is no rebound and no guarding.   Musculoskeletal: Normal range of motion.   Neurological: He is alert and oriented to person, place, and time.   Skin: Skin is warm and dry. No rash noted. He is not diaphoretic. No erythema. No pallor.   Psychiatric: He has a normal mood and affect. His behavior is normal. Judgment and thought content normal.         Recent  Labs   Lab 05/02/19  1550 05/03/19  0326 05/03/19  1525   WBC 6.68 5.86 5.24   HGB 7.9* 7.5* 7.8*   HCT 25.2* 23.9* 25.1*    146* 141*     Recent Labs   Lab 04/26/19 2122 04/27/19  0621  05/01/19 0357 05/02/19 0305 05/03/19  0326    140   < > 143 136 138   K 4.5 4.9   < > 3.9 4.6 3.8    115*   < > 110 105 103   CO2 22* 18*   < > 24 23 26   BUN 25* 47*   < > 11 13 11   CREATININE 1.5* 1.3   < > 1.3 1.5* 1.4   * 118*   < > 106 109 81   CALCIUM 8.8 8.3*   < > 8.6* 9.0 8.4*   MG  --  1.5*   < > 1.6 1.4* 1.8   PHOS  --  1.8*  --   --  3.4 2.9   LIPASE 76*  --   --   --   --   --     < > = values in this interval not displayed.     Recent Labs   Lab 04/26/19 2122 05/01/19 0357 05/02/19 0305 05/03/19  0326   ALKPHOS 82   < > 75 78 70   ALT 9*   < > 9* 9* 9*   AST 11   < > 13 16 22   ALBUMIN 3.0*   < > 3.1* 3.3* 2.9*   PROT 5.7*   < > 5.5* 5.9* 5.3*   BILITOT 0.5   < > 0.6 0.6 0.5   INR 1.0  --   --   --   --     < > = values in this interval not displayed.        No results for input(s): CPK, CPKMB, MB, TROPONINI in the last 72 hours.  No results for input(s): POCTGLUCOSE in the last 168 hours.  No results found for: HGBA1C    Scheduled Meds:   DUREZOL (DIFLUPREDNATE 0.05%) EYE DROPS (patient's own med)  1 drop Right Eye BID    ILEVO (NEPAFENAC 0.3%) EYE DROPS (patient's own med)  1 drop Right Eye Daily    pantoprazole  40 mg Oral BID AC     Continuous Infusions:   lactated ringers 100 mL/hr (05/03/19 1413)     As Needed:  sodium chloride, acetaminophen, ondansetron, ondansetron, oxyCODONE-acetaminophen, oxyCODONE-acetaminophen, sodium chloride 0.9%, sodium chloride 0.9%, sodium chloride 0.9%    Active Hospital Problems    Diagnosis  POA    *Acute upper GI bleed [K92.2]  Yes    Weakness [R53.1]  Yes    Esophageal mass [K22.9]  Yes    Hypomagnesemia [E83.42]  Yes    Acute blood loss anemia [D62]  Yes    Chronic kidney disease, stage III (moderate) [N18.3]  Yes     12/10/2015:  BUN/crea: 32/1.63. CrCl 43.      Hypertension [I10]  Yes     2012: Diagnosed.        Resolved Hospital Problems   No resolved problems to display.         Assessment and Plan    Acute upper GI bleed  Admitted to ICU @[facility]. Underwent EGD which found large circumferential mass of GEJ; biopsies taken.  -He continues on PPI  -Continue H/H q12h.  Transfuse for Hb < 8.0  -Hold aspirin and plavix.  -Trial CLD, with advancement to soft diet tomorrow if remains stable.       Esophageal mass  Per endoscopy (4/27) report:          A large, fungating mass with no bleeding and stigmata of recent        bleeding was found in the lower third of the esophagus and at the        gastroesophageal junction, 32 cm from the incisors. The mass was        partially obstructing and partially circumferential (involving        one-half of the lumen circumference). Biopsies were taken with a        cold forceps for histology.     CTS consulted; appreciate assistance.   Oncology consult placed as patient will require neoadjuvant therapy prior to resection, per CTS; biopsy pathology pending.  AES performed EUS , mass determined to be T3N1  MRI Brain, Bone scan, Radiation-Oncology consult ordered  Open jejunostomy performed on 05/01/2019,revisited on 05/02 after dislodgement of the tube, started on tube feeds      Acute blood loss anemia  -Due to GI bleeding  -Received 1 unit PRBC 4/27  -Check iron, ferritin, b12 and folate  -Repeat h/h q12h, transfuse <7 g/dl.     Hypomagnesemia  -Replete prn     Chronic kidney disease, stage III (moderate)  -back to baseline      Hypertension  -Continue to hold home oral meds in the setting of hypotension and GIB. Resume when clinically appropriate.   -Order hydralazine PRN SBP > 170      High Risk Conditions  PAD, BPH, CKD stage III, HTN, hyperuricemia      Diet: Full liquid diet, ADAT, tube feeds    GI PPx:   DVT PPx:    CHARLES/SCD    Goals of Care: Full code  Discharge plan: pending jujenostomy tube  placement and possible inpatient chemotherapy    Time (minutes) spent in care of the patient (Greater than 1/2 spent in direct face-to-face contact) 35 minutes      Griffin Panchal MD  Staff Hospitalist  Department of Hospital Medicine  [facility]   [phone]

## 2019-05-04 NOTE — PLAN OF CARE
Problem: Adult Inpatient Plan of Care  Goal: Plan of Care Review  Outcome: Ongoing (interventions implemented as appropriate)  POC reviewed with pt who verbalized understanding. AAOx4. VSS on RA. Remains free of falls and injury. IVF infusing throughout shift. ML dressing C/D/I. Left abd J tube in place.  Tolerating FL diet. TF started per orders; goal of 40cc/hr reached. Voiding per urinal. No complaints of pain or nausea. Tele monitored. No acute events. No distress noted. Will continue to monitor.

## 2019-05-04 NOTE — TREATMENT PLAN
GI Treatment Plan    Brooks Canas is a 69 y.o. male admitted to hospital 4/26/2019 (Hospital Day: 9) due to Acute upper GI bleed.     Interval History  - s/p EUS yesterday  - feels well without any complaints  - denies any symptoms of dysphagia, abdominal pain, n/v/d overnight  - on tube feeds (s/p gen surgery PEG)    Objective  Temp:  [98.4 °F (36.9 °C)-99.8 °F (37.7 °C)] 99.2 °F (37.3 °C) (05/04 1151)  Pulse:  [83-99] 89 (05/04 1151)  BP: (115-172)/(56-74) 127/61 (05/04 1151)  Resp:  [10-18] 17 (05/04 1151)  SpO2:  [90 %-97 %] 93 % (05/04 1151)    General: Alert, Oriented x3, no distress  Abdomen: Normoactive bowel sounds. Non-distended. Normal tympany. Soft. Non-tender. No peritoneal signs.    Laboratory    Recent Labs   Lab 05/03/19  0326 05/03/19  1525 05/04/19  0511   HGB 7.5* 7.8* 7.9*       Lab Results   Component Value Date    WBC 4.23 05/04/2019    HGB 7.9 (L) 05/04/2019    HCT 24.9 (L) 05/04/2019    MCV 89 05/04/2019     (L) 05/04/2019       Lab Results   Component Value Date     05/04/2019    K 3.5 05/04/2019     05/04/2019    CO2 25 05/04/2019    BUN 11 05/04/2019    CREATININE 1.3 05/04/2019    CALCIUM 8.6 (L) 05/04/2019    ANIONGAP 10 05/04/2019    ESTGFRAFRICA >60.0 05/04/2019    EGFRNONAA 54.9 (A) 05/04/2019       Lab Results   Component Value Date    ALT 8 (L) 05/04/2019    AST 16 05/04/2019    ALKPHOS 67 05/04/2019    BILITOT 0.5 05/04/2019       Lab Results   Component Value Date    INR 1.0 04/26/2019    INR 0.9 05/03/2017       Plan  - s/p EUS yesterday for esophageal cancer staging, doing well  - We are signing-off. Please call with any questions.    Thank you for involving us in the care of Brooks Canas. Please call with any additional questions, concerns or changes in the patient's clinical status.    Ervin Parikh MD  Gastroenterology Fellow, PGY IV  Spectralink: [id number]

## 2019-05-04 NOTE — PROGRESS NOTES
[facility]  General Surgery  Progress Note    Subjective:     History of Present Illness:  Brooks Canas is a 69 y.o. male with a h/o PAD (s/p Right fem-pop bypass with SVG in 2016; on Plavix), BPH, CKD stage III, HTN, hyperuricemia, and tobacco abuse who presents as a transfer from OSH for hematemesis.  He reports having dysphagia to solids that has progressively worsened over time.  He denies weight loss, appetite changes, fever, chills, nausea, vomiting, chest pain, shortness of breath.  He has been diagnosed with esophageal cancer this admission.  He is in need of enteral access for feeding.  He last took his Plavix 3 days ago.  His abdominal surgical history is significant for open appendectomy.    Post-Op Info:  Procedure(s) (LRB):  ULTRASOUND, UPPER GI TRACT, ENDOSCOPIC (N/A)   1 Day Post-Op     Interval History: No acute events overnight. Tolerating diet and tube feeds. No nausea and vomiting. Pain well controlled.     Medications:  Continuous Infusions:   lactated ringers 100 mL/hr (05/04/19 0423)     Scheduled Meds:   DUREZOL (DIFLUPREDNATE 0.05%) EYE DROPS (patient's own med)  1 drop Right Eye BID    ILEVO (NEPAFENAC 0.3%) EYE DROPS (patient's own med)  1 drop Right Eye Daily    pantoprazole  40 mg Oral BID AC    tamsulosin  0.4 mg Oral Daily     PRN Meds:sodium chloride, acetaminophen, hydrALAZINE, ondansetron, ondansetron, oxyCODONE-acetaminophen, oxyCODONE-acetaminophen, sodium chloride 0.9%, sodium chloride 0.9%, sodium chloride 0.9%     Review of patient's allergies indicates:  No Known Allergies  Objective:     Vital Signs (Most Recent):  Temp: 98.9 °F (37.2 °C) (05/04/19 0717)  Pulse: 90 (05/04/19 0733)  Resp: 18 (05/04/19 0717)  BP: (!) 115/56 (05/04/19 0717)  SpO2: (!) 90 % (05/04/19 0717) Vital Signs (24h Range):  Temp:  [98.4 °F (36.9 °C)-100.2 °F (37.9 °C)] 98.9 °F (37.2 °C)  Pulse:  [] 90  Resp:  [10-18] 18  SpO2:  [90 %-98 %] 90 %  BP: (115-186)/(56-74)  115/56     Weight: 81 kg (178 lb 9.2 oz)  Body mass index is 25.62 kg/m².    Intake/Output - Last 3 Shifts       05/02 0700 - 05/03 0659 05/03 0700 - 05/04 0659 05/04 0700 - 05/05 0659    P.O. 30 500     I.V. (mL/kg) 2918.3 (36.2) 2186.7 (27.1)     NG/GT  258 228    Total Intake(mL/kg) 2948.3 (36.5) 2944.7 (36.5) 228 (2.8)    Urine (mL/kg/hr) 1600 (0.8) 2200 (1.1)     Stool 0 0     Total Output 1600 2200     Net +1348.3 +744.7 +228           Stool Occurrence 0 x 0 x 0 x          Physical Exam   Constitutional: He is oriented to person, place, and time. He appears well-developed and well-nourished. No distress.   HENT:   Head: Normocephalic and atraumatic.   Eyes: Conjunctivae and EOM are normal. No scleral icterus.   Neck: Normal range of motion.   Pulmonary/Chest: Effort normal and breath sounds normal. No respiratory distress.   Abdominal: Soft. Bowel sounds are normal. He exhibits no distension. There is tenderness (appropriate post op). There is no rebound and no guarding.   J tube intact. Midline incision clean, dry, and intact.    Musculoskeletal: Normal range of motion.   Neurological: He is alert and oriented to person, place, and time.   Skin: Skin is warm and dry. No rash noted. He is not diaphoretic. No erythema. No pallor.   Psychiatric: He has a normal mood and affect. His behavior is normal. Judgment and thought content normal.   Nursing note and vitals reviewed.      Significant Labs:  CBC:   Recent Labs   Lab 05/04/19  0511   WBC 4.23   RBC 2.81*   HGB 7.9*   HCT 24.9*   *   MCV 89   MCH 28.1   MCHC 31.7*     CMP:   Recent Labs   Lab 05/04/19  0511   *   CALCIUM 8.6*   ALBUMIN 2.6*   PROT 5.3*      K 3.5   CO2 25      BUN 11   CREATININE 1.3   ALKPHOS 67   ALT 8*   AST 16   BILITOT 0.5       Significant Diagnostics:  I have reviewed all pertinent imaging results/findings within the past 24 hours.    Assessment/Plan:     Esophageal mass  Initial open jejunostomy placed  05/01/19 which fell out on POD 1 and replaced POD 0 05/02/19. Doing well now without any issues. Tolerating diet and tube feeds.     -tube feeds to goal  -okay for diet, advance as tolerated  -surgery will sign off. 2 week follow up with eyad.   - Please call if any questions, thank you.         Castillo Malcolm MD  General Surgery  [facility]

## 2019-05-05 LAB
ABO + RH BLD: NORMAL
ALBUMIN SERPL BCP-MCNC: 2.1 G/DL (ref 3.5–5.2)
ALP SERPL-CCNC: 69 U/L (ref 55–135)
ALT SERPL W/O P-5'-P-CCNC: 6 U/L (ref 10–44)
ANION GAP SERPL CALC-SCNC: 7 MMOL/L (ref 8–16)
ANISOCYTOSIS BLD QL SMEAR: SLIGHT
AST SERPL-CCNC: 14 U/L (ref 10–40)
BASOPHILS # BLD AUTO: 0.01 K/UL (ref 0–0.2)
BASOPHILS # BLD AUTO: 0.02 K/UL (ref 0–0.2)
BASOPHILS NFR BLD: 0.3 % (ref 0–1.9)
BASOPHILS NFR BLD: 0.3 % (ref 0–1.9)
BILIRUB SERPL-MCNC: 0.4 MG/DL (ref 0.1–1)
BLD GP AB SCN CELLS X3 SERPL QL: NORMAL
BLD PROD TYP BPU: NORMAL
BLOOD UNIT EXPIRATION DATE: NORMAL
BLOOD UNIT TYPE CODE: 9500
BLOOD UNIT TYPE: NORMAL
BUN SERPL-MCNC: 14 MG/DL (ref 8–23)
CALCIUM SERPL-MCNC: 8.1 MG/DL (ref 8.7–10.5)
CHLORIDE SERPL-SCNC: 107 MMOL/L (ref 95–110)
CO2 SERPL-SCNC: 26 MMOL/L (ref 23–29)
CODING SYSTEM: NORMAL
CREAT SERPL-MCNC: 1.2 MG/DL (ref 0.5–1.4)
DIFFERENTIAL METHOD: ABNORMAL
DIFFERENTIAL METHOD: ABNORMAL
DISPENSE STATUS: NORMAL
EOSINOPHIL # BLD AUTO: 0.1 K/UL (ref 0–0.5)
EOSINOPHIL # BLD AUTO: 0.1 K/UL (ref 0–0.5)
EOSINOPHIL NFR BLD: 1.2 % (ref 0–8)
EOSINOPHIL NFR BLD: 3.5 % (ref 0–8)
ERYTHROCYTE [DISTWIDTH] IN BLOOD BY AUTOMATED COUNT: 15.1 % (ref 11.5–14.5)
ERYTHROCYTE [DISTWIDTH] IN BLOOD BY AUTOMATED COUNT: 15.5 % (ref 11.5–14.5)
EST. GFR  (AFRICAN AMERICAN): >60 ML/MIN/1.73 M^2
EST. GFR  (NON AFRICAN AMERICAN): >60 ML/MIN/1.73 M^2
GLUCOSE SERPL-MCNC: 142 MG/DL (ref 70–110)
HCT VFR BLD AUTO: 22.6 % (ref 40–54)
HCT VFR BLD AUTO: 28.5 % (ref 40–54)
HGB BLD-MCNC: 7.1 G/DL (ref 14–18)
HGB BLD-MCNC: 9.2 G/DL (ref 14–18)
HYPOCHROMIA BLD QL SMEAR: ABNORMAL
IMM GRANULOCYTES # BLD AUTO: 0.02 K/UL (ref 0–0.04)
IMM GRANULOCYTES # BLD AUTO: 0.06 K/UL (ref 0–0.04)
IMM GRANULOCYTES NFR BLD AUTO: 0.5 % (ref 0–0.5)
IMM GRANULOCYTES NFR BLD AUTO: 0.8 % (ref 0–0.5)
LYMPHOCYTES # BLD AUTO: 0.4 K/UL (ref 1–4.8)
LYMPHOCYTES # BLD AUTO: 0.6 K/UL (ref 1–4.8)
LYMPHOCYTES NFR BLD: 11.1 % (ref 18–48)
LYMPHOCYTES NFR BLD: 8.2 % (ref 18–48)
MAGNESIUM SERPL-MCNC: 1.7 MG/DL (ref 1.6–2.6)
MCH RBC QN AUTO: 27.7 PG (ref 27–31)
MCH RBC QN AUTO: 28.1 PG (ref 27–31)
MCHC RBC AUTO-ENTMCNC: 31.4 G/DL (ref 32–36)
MCHC RBC AUTO-ENTMCNC: 32.3 G/DL (ref 32–36)
MCV RBC AUTO: 87 FL (ref 82–98)
MCV RBC AUTO: 88 FL (ref 82–98)
MONOCYTES # BLD AUTO: 0.4 K/UL (ref 0.3–1)
MONOCYTES # BLD AUTO: 0.6 K/UL (ref 0.3–1)
MONOCYTES NFR BLD: 12 % (ref 4–15)
MONOCYTES NFR BLD: 7.7 % (ref 4–15)
NEUTROPHILS # BLD AUTO: 2.7 K/UL (ref 1.8–7.7)
NEUTROPHILS # BLD AUTO: 6 K/UL (ref 1.8–7.7)
NEUTROPHILS NFR BLD: 72.6 % (ref 38–73)
NEUTROPHILS NFR BLD: 81.8 % (ref 38–73)
NRBC BLD-RTO: 0 /100 WBC
NRBC BLD-RTO: 0 /100 WBC
PHOSPHATE SERPL-MCNC: 2.5 MG/DL (ref 2.7–4.5)
PLATELET # BLD AUTO: 158 K/UL (ref 150–350)
PLATELET # BLD AUTO: 187 K/UL (ref 150–350)
PLATELET BLD QL SMEAR: ABNORMAL
PMV BLD AUTO: 10.3 FL (ref 9.2–12.9)
PMV BLD AUTO: 10.5 FL (ref 9.2–12.9)
POLYCHROMASIA BLD QL SMEAR: ABNORMAL
POTASSIUM SERPL-SCNC: 3.9 MMOL/L (ref 3.5–5.1)
PROT SERPL-MCNC: 4.5 G/DL (ref 6–8.4)
RBC # BLD AUTO: 2.56 M/UL (ref 4.6–6.2)
RBC # BLD AUTO: 3.27 M/UL (ref 4.6–6.2)
SODIUM SERPL-SCNC: 140 MMOL/L (ref 136–145)
TOXIC GRANULES BLD QL SMEAR: PRESENT
TRANS ERYTHROCYTES VOL PATIENT: NORMAL ML
WBC # BLD AUTO: 3.68 K/UL (ref 3.9–12.7)
WBC # BLD AUTO: 7.3 K/UL (ref 3.9–12.7)

## 2019-05-05 PROCEDURE — 83735 ASSAY OF MAGNESIUM: CPT

## 2019-05-05 PROCEDURE — 20600001 HC STEP DOWN PRIVATE ROOM

## 2019-05-05 PROCEDURE — 86920 COMPATIBILITY TEST SPIN: CPT

## 2019-05-05 PROCEDURE — 25000003 PHARM REV CODE 250: Performed by: STUDENT IN AN ORGANIZED HEALTH CARE EDUCATION/TRAINING PROGRAM

## 2019-05-05 PROCEDURE — 25000003 PHARM REV CODE 250: Performed by: HOSPITALIST

## 2019-05-05 PROCEDURE — P9021 RED BLOOD CELLS UNIT: HCPCS

## 2019-05-05 PROCEDURE — 36415 COLL VENOUS BLD VENIPUNCTURE: CPT

## 2019-05-05 PROCEDURE — 85025 COMPLETE CBC W/AUTO DIFF WBC: CPT | Mod: 91

## 2019-05-05 PROCEDURE — 36430 TRANSFUSION BLD/BLD COMPNT: CPT

## 2019-05-05 PROCEDURE — 99232 SBSQ HOSP IP/OBS MODERATE 35: CPT | Mod: ,,, | Performed by: RADIOLOGY

## 2019-05-05 PROCEDURE — 99233 PR SUBSEQUENT HOSPITAL CARE,LEVL III: ICD-10-PCS | Mod: ,,, | Performed by: HOSPITALIST

## 2019-05-05 PROCEDURE — 99232 PR SUBSEQUENT HOSPITAL CARE,LEVL II: ICD-10-PCS | Mod: ,,, | Performed by: RADIOLOGY

## 2019-05-05 PROCEDURE — 84100 ASSAY OF PHOSPHORUS: CPT

## 2019-05-05 PROCEDURE — 80053 COMPREHEN METABOLIC PANEL: CPT

## 2019-05-05 PROCEDURE — 63600175 PHARM REV CODE 636 W HCPCS: Performed by: STUDENT IN AN ORGANIZED HEALTH CARE EDUCATION/TRAINING PROGRAM

## 2019-05-05 PROCEDURE — 86850 RBC ANTIBODY SCREEN: CPT

## 2019-05-05 PROCEDURE — 25000003 PHARM REV CODE 250: Performed by: PHYSICIAN ASSISTANT

## 2019-05-05 PROCEDURE — 99233 SBSQ HOSP IP/OBS HIGH 50: CPT | Mod: ,,, | Performed by: HOSPITALIST

## 2019-05-05 RX ADMIN — ONDANSETRON 4 MG: 2 SOLUTION INTRAMUSCULAR; INTRAVENOUS at 08:05

## 2019-05-05 RX ADMIN — SODIUM CHLORIDE, SODIUM LACTATE, POTASSIUM CHLORIDE, AND CALCIUM CHLORIDE 100 ML/HR: 600; 310; 30; 20 INJECTION, SOLUTION INTRAVENOUS at 11:05

## 2019-05-05 RX ADMIN — PANTOPRAZOLE SODIUM 40 MG: 40 TABLET, DELAYED RELEASE ORAL at 04:05

## 2019-05-05 RX ADMIN — PANTOPRAZOLE SODIUM 40 MG: 40 TABLET, DELAYED RELEASE ORAL at 07:05

## 2019-05-05 RX ADMIN — ACETAMINOPHEN 650 MG: 325 TABLET ORAL at 04:05

## 2019-05-05 RX ADMIN — OXYCODONE HYDROCHLORIDE AND ACETAMINOPHEN 1 TABLET: 5; 325 TABLET ORAL at 01:05

## 2019-05-05 RX ADMIN — TAMSULOSIN HYDROCHLORIDE 0.4 MG: 0.4 CAPSULE ORAL at 08:05

## 2019-05-05 RX ADMIN — SODIUM CHLORIDE, SODIUM LACTATE, POTASSIUM CHLORIDE, AND CALCIUM CHLORIDE 100 ML/HR: 600; 310; 30; 20 INJECTION, SOLUTION INTRAVENOUS at 09:05

## 2019-05-05 NOTE — PLAN OF CARE
Problem: Adult Inpatient Plan of Care  Goal: Plan of Care Review  Outcome: Ongoing (interventions implemented as appropriate)  Plan of care reviewed with patient. Verbalizes understanding. AAOx4. Blood pressure slightly elevated. Patient denies any pain at this time. Tolerating diet. Did complain of fullness feeling but residual was 0 ml. Denied any episodes of nausea or vomiting. Patient ambulated in roy independently; remains free of any falls or trauma. IV fluids infusing at ordered rate 100ml/hr. Tube feedings currently infusing at 40ml/hr. Patient received 1 unit of RBCs and has tolerated well, according to vital signs and patient's response. Patient has no other concerns at this time. Call light within reach. TM.

## 2019-05-05 NOTE — PROGRESS NOTES
Hospital Medicine  Progress note    Team: [facility] [place] [place] [facility] MED R Griffin Garner MD  Admit Date: 4/26/2019  OLIVIA 5/5/2019  Length of Stay:  LOS: 9 days   Code status: Full Code    Principal Problem:  Acute upper GI bleed    Overview:    Interval hx:  Patient seen and examined at bedside, tube feeds at goal, MRI Brain unremarkable, bone scan pending. No acute events overnight. Radiation oncology to start treatment tomorrow with concurrent chemotherapy       ROS   Constitutional: Negative for fever.   Respiratory: Negative for shortness of breath.    Cardiovascular: Negative for chest pain, palpitations and leg swelling.   Gastrointestinal: Negative for abdominal pain, blood in stool, constipation, diarrhea, nausea and vomiting.   Neurological: Negative for dizziness and syncope.   Psychiatric/Behavioral: Negative for agitation, behavioral problems and confusion.          PEx  Temp:  [98.4 °F (36.9 °C)-99.5 °F (37.5 °C)]   Pulse:  []   Resp:  [17-20]   BP: (137-164)/(62-76)   SpO2:  [91 %-96 %]     Intake/Output Summary (Last 24 hours) at 5/5/2019 1505  Last data filed at 5/5/2019 1414  Gross per 24 hour   Intake 5992 ml   Output 1000 ml   Net 4992 ml     Constitutional: He is oriented to person, place, and time. He appears well-developed and well-nourished. No distress.   HENT:   Head: Normocephalic and atraumatic.   Eyes: Conjunctivae and EOM are normal. No scleral icterus.   Neck: Normal range of motion.   Pulmonary/Chest: Effort normal and breath sounds normal. No respiratory distress.   Abdominal: Soft. Bowel sounds are normal. He exhibits no distension. There is no tenderness. There is no rebound and no guarding.   Musculoskeletal: Normal range of motion.   Neurological: He is alert and oriented to person, place, and time.   Skin: Skin is warm and dry. No rash noted. He is not diaphoretic. No erythema. No pallor.   Psychiatric: He has a normal mood and affect. His behavior is normal. Judgment and thought content  normal.         Recent Labs   Lab 05/04/19  0511 05/04/19  1602 05/05/19  0336   WBC 4.23 4.42 3.68*   HGB 7.9* 7.5* 7.1*   HCT 24.9* 22.7* 22.6*   * 145* 158     Recent Labs   Lab 05/03/19 0326 05/04/19  0511 05/05/19  0336    139 140   K 3.8 3.5 3.9    104 107   CO2 26 25 26   BUN 11 11 14   CREATININE 1.4 1.3 1.2   GLU 81 123* 142*   CALCIUM 8.4* 8.6* 8.1*   MG 1.8 1.6 1.7   PHOS 2.9 2.4* 2.5*     Recent Labs   Lab 05/03/19 0326 05/04/19 0511 05/05/19  0336   ALKPHOS 70 67 69   ALT 9* 8* 6*   AST 22 16 14   ALBUMIN 2.9* 2.6* 2.1*   PROT 5.3* 5.3* 4.5*   BILITOT 0.5 0.5 0.4        No results for input(s): CPK, CPKMB, MB, TROPONINI in the last 72 hours.  No results for input(s): POCTGLUCOSE in the last 168 hours.  No results found for: HGBA1C    Scheduled Meds:   DUREZOL (DIFLUPREDNATE 0.05%) EYE DROPS (patient's own med)  1 drop Right Eye BID    ILEVO (NEPAFENAC 0.3%) EYE DROPS (patient's own med)  1 drop Right Eye Daily    pantoprazole  40 mg Oral BID AC    tamsulosin  0.4 mg Oral Daily     Continuous Infusions:   lactated ringers 100 mL/hr (05/05/19 0917)     As Needed:  sodium chloride, acetaminophen, hydrALAZINE, ondansetron, ondansetron, oxyCODONE-acetaminophen, oxyCODONE-acetaminophen, sodium chloride 0.9%, sodium chloride 0.9%, sodium chloride 0.9%    Active Hospital Problems    Diagnosis  POA    *Acute upper GI bleed [K92.2]  Yes    Weakness [R53.1]  Yes    Esophageal mass [K22.9]  Yes     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 CrCl 43.      Hypertension [I10]  Yes     2012: Diagnosed.        Resolved Hospital Problems   No resolved problems to display.         Assessment and Plan    Acute upper GI bleed  Admitted to ICU [facility] Underwent EGD which found large circumferential mass of GEJ;  biopsies taken.  -He continues on PPI  -Continue H/H q12h.  Transfuse for Hb < 8.0  -Hold aspirin and plavix.  -Trial CLD, with advancement to soft diet tomorrow if remains stable.       Esophageal mass  Per endoscopy (4/27) report:          A large, fungating mass with no bleeding and stigmata of recent        bleeding was found in the lower third of the esophagus and at the        gastroesophageal junction, 32 cm from the incisors. The mass was        partially obstructing and partially circumferential (involving        one-half of the lumen circumference). Biopsies were taken with a        cold forceps for histology.     CTS consulted; appreciate assistance.   Oncology consult placed as patient will require neoadjuvant therapy prior to resection, per CTS; biopsy pathology pending.  AES performed EUS , mass determined to be T3N1  MRI Brain unremarkable, Bone scan pending, Radiation-Oncology consult saw patient, planning on starting radiation tomorrow 05/06/0219  Open jejunostomy performed on 05/01/2019,revisited on 05/02 after dislodgement of the tube, started on tube feeds      Acute blood loss anemia  -Due to GI bleeding  -Received 1 unit PRBC 4/27  -Check iron, ferritin, b12 and folate  -Repeat h/h q12h, transfuse <7 g/dl.     Hypomagnesemia  -Replete prn     Chronic kidney disease, stage III (moderate)  -back to baseline      Hypertension  -Continue to hold home oral meds in the setting of hypotension and GIB. Resume when clinically appropriate.   -Order hydralazine PRN SBP > 170      High Risk Conditions  PAD, BPH, CKD stage III, HTN, hyperuricemia      Diet: Full liquid diet, ADAT, tube feeds    GI PPx:   DVT PPx:    CHARLES/SCD    Goals of Care: Full code  Discharge plan: pending jujenostomy tube placement and possible inpatient chemotherapy    Time (minutes) spent in care of the patient (Greater than 1/2 spent in direct face-to-face contact) 35 minutes      Griffin Panchal MD  Staff Hospitalist  Department  of Hospital Medicine [facility]   [phone]

## 2019-05-05 NOTE — PLAN OF CARE
Problem: Fall Injury Risk  Goal: Absence of Fall and Fall-Related Injury  Outcome: Ongoing (interventions implemented as appropriate)  POC reviewed with pt. Verbalizes understanding. AAOx4. VSS. denies any pain at this time, tolerating diet and tube feedings which are infusing at the goal 40ml/hr. remains free of any falls or trauma.No BM noted this shift; flatus present. Fall and safety measures ongoing, Call light within reach. Will continue to monitor

## 2019-05-05 NOTE — HPI
REFERRING PHYSICIAN: Griffin Galaviz MD    PROBLEM:  Mr. 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    OTHER MEDICAL HISTORY:  Patient quit smoking 03/01/2016.  He had femoral-popliteal bypass surgery 03/04/2016.  He is treated her followed for benign prostatic hypertrophy, chronic kidney disease, peripheral vascular disease with claudication, hypertension, hyperuricemia and right leg edema. PS is ECOG 2.    PRESENT ILLNESS:  Patient was seen in the ED on 04/26/2019 complaining of the onset of hematemesis 20 min before.  The hemoglobin was 7.7.  He was admitted and treated with transfusion.      Upper GI endoscopy 4/27/19 reports a partly obstructing mass in the lower 3rd of the esophagus with some extension into the cardia.  Biopsy reports a poorly differentiated adenocarcinoma.  Endoscopic ultrasound on 05/03/2019 reports extension through the muscular wall of the esophagus and a suspicious periesophageal lymph node.  The tumor was described as extending from 32-35 cm from the incisors.     A Jejunostomy to was placed via laparotomy on 05/01/2019.  He is able to swallow liquids and solids.    PHYSICAL EXAMINATION:  Patient is alert man who responds appropriately.  He is seated in a bedside chair with nutritional fluid being given via the J-tube.  Respirations are normal. There is no cervical or supraclavicular lymphadenopathy.  There are no evident neurologic deficits.    RADIOLOGIC STUDIES:  CT scan of the chest on 04/28/2019 shows asymmetric thickening on the left side of the distal esophagus extending from just below the roby to the GE junction.  A less than 6 mm nodule is noted in the right upper lobe of the.  There is a 2 cm right thyroid mild dilation the infrarenal aorta.     MRI of the brain 05/03/2019 shows no evidence of metastasis.     LABORATORY STUDIES:  At admission on 04/26/2019  the hemoglobin was 10.3 with a white count of 12,140 and a platelet count of 224,000. On 04/27/19 the hemoglobin decreased to 7.7  and then increased to 8.9.  On 05/05/2017 the hemoglobin is 7.1.  Comprehensive metabolic panel on 04/26/2019 is remarkable for glucose of 164, a BUN of 25, creatinine of 1.5 and an albumin of 3.0.    IMPRESSION:  Neoadjuvant  treatment with radiation and concurrent chemotherapy is recommended.  In view of the ongoing blood loss, radiation treatment should be initiated promptly.     PLAN:  We will have patient brought to radiation oncology on 05/06/2019 for radiation treatment planning simulation.  A course of radiation to a dose of about 50.4 Gy fractions of 1.8 Gy each with concurrent chemotherapy is anticipated to begin on 5/6/19 or 5/7/19. Esophagectomy will likely follow about 8 weeks after the completion of the radiation treatment.

## 2019-05-05 NOTE — CONSULTS
[facility]  Radiation Oncology  Consult Note    Patient Name: Brooks Canas  MRN: [id number]  Admission Date: 4/26/2019  Hospital Length of Stay: 9 days  Code Status: Full Code   Attending Provider: Griffin Garner MD  Consulting Provider: Mihir Morgan MD  Primary Care Physician: Aris Sahu MD  Principal Problem:Acute upper GI bleed    Inpatient consult to Radiation Oncology  Consult performed by: Mihir Morgan MD  Consult ordered by: Griffin Garner MD        Subjective:     HPI:  REFERRING PHYSICIAN: Griffin Galaviz MD    PROBLEM:  Mr. 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    OTHER MEDICAL HISTORY:  Patient quit smoking 03/01/2016.  He had femoral-popliteal bypass surgery 03/04/2016.  He is treated her followed for benign prostatic hypertrophy, chronic kidney disease, peripheral vascular disease with claudication, hypertension, hyperuricemia and right leg edema. PS is ECOG 2.    PRESENT ILLNESS:  Patient was seen in the ED on 04/26/2019 complaining of the onset of hematemesis 20 min before.  The hemoglobin was 7.7.  He was admitted and treated with transfusion.      Upper GI endoscopy 4/27/19 reports a partly obstructing mass in the lower 3rd of the esophagus with some extension into the cardia.  Biopsy reports a poorly differentiated adenocarcinoma.  Endoscopic ultrasound on 05/03/2019 reports extension through the muscular wall of the esophagus and a suspicious periesophageal lymph node.  The tumor was described as extending from 32-35 cm from the incisors.     A Jejunostomy to was placed via laparotomy on 05/01/2019.  He is able to swallow liquids and solids.    PHYSICAL EXAMINATION:  Patient is alert man who responds appropriately.  He is seated in a bedside chair with nutritional fluid being given via the J-tube.  Respirations are normal. There is  no cervical or supraclavicular lymphadenopathy.  There are no evident neurologic deficits.    RADIOLOGIC STUDIES:  CT scan of the chest on 04/28/2019 shows asymmetric thickening on the left side of the distal esophagus extending from just below the roby to the GE junction.  A less than 6 mm nodule is noted in the right upper lobe of the.  There is a 2 cm right thyroid mild dilation the infrarenal aorta.     MRI of the brain 05/03/2019 shows no evidence of metastasis.     LABORATORY STUDIES:  At admission on 04/26/2019 the hemoglobin was 10.3 with a white count of 12,140 and a platelet count of 224,000. On 04/27/19 the hemoglobin decreased to 7.7  and then increased to 8.9.  On 05/05/2017 the hemoglobin is 7.1.  Comprehensive metabolic panel on 04/26/2019 is remarkable for glucose of 164, a BUN of 25, creatinine of 1.5 and an albumin of 3.0.    IMPRESSION:  Neoadjuvant  treatment with radiation and concurrent chemotherapy is recommended.  In view of the ongoing blood loss, radiation treatment should be initiated promptly.     PLAN:  We will have patient brought to radiation oncology on 05/06/2019 for radiation treatment planning simulation.  A course of radiation to a dose of about 50.4 Gy fractions of 1.8 Gy each with concurrent chemotherapy is anticipated to begin on 5/6/19 or 5/7/19. Esophagectomy will likely follow about 8 weeks after the completion of the radiation treatment.     No new subjective & objective note has been filed under this hospital service since the last note was generated.    Assessment/Plan:         Thank you for your consult.     Mihir Morgan MD  Radiation Oncology  [facility]

## 2019-05-05 NOTE — PROGRESS NOTES
Hospital Medicine  Progress note    Team: [facility] R Griffin Garner MD  Admit Date: 4/26/2019  OLIVIA 5/5/2019  Length of Stay:  LOS: 8 days   Code status: Full Code    Principal Problem:  Acute upper GI bleed    Overview:    Interval hx:  Patient seen and examined at bedside, tube feeds at goal, MRI Brain unremarkable, bone scan pending. No acute events overnight.       ROS   Constitutional: Negative for fever.   Respiratory: Negative for shortness of breath.    Cardiovascular: Negative for chest pain, palpitations and leg swelling.   Gastrointestinal: Negative for abdominal pain, blood in stool, constipation, diarrhea, nausea and vomiting.   Neurological: Negative for dizziness and syncope.   Psychiatric/Behavioral: Negative for agitation, behavioral problems and confusion.          PEx  Temp:  [98.6 °F (37 °C)-99.2 °F (37.3 °C)]   Pulse:  [75-99]   Resp:  [16-18]   BP: (115-154)/(56-65)   SpO2:  [90 %-97 %]     Intake/Output Summary (Last 24 hours) at 5/4/2019 2130  Last data filed at 5/4/2019 1605  Gross per 24 hour   Intake 4299.67 ml   Output 1300 ml   Net 2999.67 ml     Constitutional: He is oriented to person, place, and time. He appears well-developed and well-nourished. No distress.   HENT:   Head: Normocephalic and atraumatic.   Eyes: Conjunctivae and EOM are normal. No scleral icterus.   Neck: Normal range of motion.   Pulmonary/Chest: Effort normal and breath sounds normal. No respiratory distress.   Abdominal: Soft. Bowel sounds are normal. He exhibits no distension. There is no tenderness. There is no rebound and no guarding.   Musculoskeletal: Normal range of motion.   Neurological: He is alert and oriented to person, place, and time.   Skin: Skin is warm and dry. No rash noted. He is not diaphoretic. No erythema. No pallor.   Psychiatric: He has a normal mood and affect. His behavior is normal. Judgment and thought content normal.         Recent Labs   Lab 05/03/19  1525 05/04/19  0511  05/04/19  1602   WBC 5.24 4.23 4.42   HGB 7.8* 7.9* 7.5*   HCT 25.1* 24.9* 22.7*   * 147* 145*     Recent Labs   Lab 05/02/19 0305 05/03/19  0326 05/04/19  0511    138 139   K 4.6 3.8 3.5    103 104   CO2 23 26 25   BUN 13 11 11   CREATININE 1.5* 1.4 1.3    81 123*   CALCIUM 9.0 8.4* 8.6*   MG 1.4* 1.8 1.6   PHOS 3.4 2.9 2.4*     Recent Labs   Lab 05/02/19 0305 05/03/19 0326 05/04/19  0511   ALKPHOS 78 70 67   ALT 9* 9* 8*   AST 16 22 16   ALBUMIN 3.3* 2.9* 2.6*   PROT 5.9* 5.3* 5.3*   BILITOT 0.6 0.5 0.5        No results for input(s): CPK, CPKMB, MB, TROPONINI in the last 72 hours.  No results for input(s): POCTGLUCOSE in the last 168 hours.  No results found for: HGBA1C    Scheduled Meds:   DUREZOL (DIFLUPREDNATE 0.05%) EYE DROPS (patient's own med)  1 drop Right Eye BID    ILEVO (NEPAFENAC 0.3%) EYE DROPS (patient's own med)  1 drop Right Eye Daily    pantoprazole  40 mg Oral BID AC    tamsulosin  0.4 mg Oral Daily     Continuous Infusions:   lactated ringers 100 mL/hr (05/04/19 1149)     As Needed:  sodium chloride, acetaminophen, hydrALAZINE, ondansetron, ondansetron, oxyCODONE-acetaminophen, oxyCODONE-acetaminophen, sodium chloride 0.9%, sodium chloride 0.9%, sodium chloride 0.9%    Active Hospital Problems    Diagnosis  POA    *Acute upper GI bleed [K92.2]  Yes    Weakness [R53.1]  Yes    Esophageal mass [K22.9]  Yes    Hypomagnesemia [E83.42]  Yes    Acute blood loss anemia [D62]  Yes    Chronic kidney disease, stage III (moderate) [N18.3]  Yes     12/10/2015: BUN/crea: 32/1.63. CrCl 43.      Hypertension [I10]  Yes     2012: Diagnosed.        Resolved Hospital Problems   No resolved problems to display.         Assessment and Plan    Acute upper GI bleed  Admitted to ICU @[facility]. Underwent EGD which found large circumferential mass of GEJ; biopsies taken.  -He continues on PPI  -Continue H/H q12h.  Transfuse for Hb < 8.0  -Hold aspirin and plavix.  -Trial CLD, with  advancement to soft diet tomorrow if remains stable.       Esophageal mass  Per endoscopy (4/27) report:          A large, fungating mass with no bleeding and stigmata of recent        bleeding was found in the lower third of the esophagus and at the        gastroesophageal junction, 32 cm from the incisors. The mass was        partially obstructing and partially circumferential (involving        one-half of the lumen circumference). Biopsies were taken with a        cold forceps for histology.     CTS consulted; appreciate assistance.   Oncology consult placed as patient will require neoadjuvant therapy prior to resection, per CTS; biopsy pathology pending.  AES performed EUS , mass determined to be T3N1  MRI Brain unremarkable, Bone scan pending, Radiation-Oncology consult ordered  Open jejunostomy performed on 05/01/2019,revisited on 05/02 after dislodgement of the tube, started on tube feeds      Acute blood loss anemia  -Due to GI bleeding  -Received 1 unit PRBC 4/27  -Check iron, ferritin, b12 and folate  -Repeat h/h q12h, transfuse <7 g/dl.     Hypomagnesemia  -Replete prn     Chronic kidney disease, stage III (moderate)  -back to baseline      Hypertension  -Continue to hold home oral meds in the setting of hypotension and GIB. Resume when clinically appropriate.   -Order hydralazine PRN SBP > 170      High Risk Conditions  PAD, BPH, CKD stage III, HTN, hyperuricemia      Diet: Full liquid diet, ADAT, tube feeds    GI PPx:   DVT PPx:    CHARLES/SCD    Goals of Care: Full code  Discharge plan: pending jujenostomy tube placement and possible inpatient chemotherapy    Time (minutes) spent in care of the patient (Greater than 1/2 spent in direct face-to-face contact) 35 minutes      Griffin Panchal MD  Staff Hospitalist  Department of Hospital Medicine  [facility]   [phone]

## 2019-05-06 ENCOUNTER — PATIENT MESSAGE (OUTPATIENT)
Dept: HEMATOLOGY/ONCOLOGY | Facility: CLINIC | Age: 69
End: 2019-05-06

## 2019-05-06 ENCOUNTER — DOCUMENTATION ONLY (OUTPATIENT)
Dept: RADIATION THERAPY | Facility: HOSPITAL | Age: 69
End: 2019-05-06

## 2019-05-06 LAB
ALBUMIN SERPL BCP-MCNC: 2.6 G/DL (ref 3.5–5.2)
ALP SERPL-CCNC: 67 U/L (ref 55–135)
ALT SERPL W/O P-5'-P-CCNC: 11 U/L (ref 10–44)
ANION GAP SERPL CALC-SCNC: 7 MMOL/L (ref 8–16)
AST SERPL-CCNC: 24 U/L (ref 10–40)
BASOPHILS # BLD AUTO: 0.02 K/UL (ref 0–0.2)
BASOPHILS NFR BLD: 0.4 % (ref 0–1.9)
BILIRUB SERPL-MCNC: 1.1 MG/DL (ref 0.1–1)
BUN SERPL-MCNC: 19 MG/DL (ref 8–23)
CALCIUM SERPL-MCNC: 8.5 MG/DL (ref 8.7–10.5)
CHLORIDE SERPL-SCNC: 104 MMOL/L (ref 95–110)
CO2 SERPL-SCNC: 30 MMOL/L (ref 23–29)
CREAT SERPL-MCNC: 1.1 MG/DL (ref 0.5–1.4)
DIFFERENTIAL METHOD: ABNORMAL
EOSINOPHIL # BLD AUTO: 0.1 K/UL (ref 0–0.5)
EOSINOPHIL NFR BLD: 1.1 % (ref 0–8)
ERYTHROCYTE [DISTWIDTH] IN BLOOD BY AUTOMATED COUNT: 15.4 % (ref 11.5–14.5)
EST. GFR  (AFRICAN AMERICAN): >60 ML/MIN/1.73 M^2
EST. GFR  (NON AFRICAN AMERICAN): >60 ML/MIN/1.73 M^2
GLUCOSE SERPL-MCNC: 136 MG/DL (ref 70–110)
HCT VFR BLD AUTO: 30.1 % (ref 40–54)
HGB BLD-MCNC: 9.4 G/DL (ref 14–18)
IMM GRANULOCYTES # BLD AUTO: 0.04 K/UL (ref 0–0.04)
IMM GRANULOCYTES NFR BLD AUTO: 0.7 % (ref 0–0.5)
LYMPHOCYTES # BLD AUTO: 0.6 K/UL (ref 1–4.8)
LYMPHOCYTES NFR BLD: 11.7 % (ref 18–48)
MAGNESIUM SERPL-MCNC: 1.8 MG/DL (ref 1.6–2.6)
MCH RBC QN AUTO: 27.4 PG (ref 27–31)
MCHC RBC AUTO-ENTMCNC: 31.2 G/DL (ref 32–36)
MCV RBC AUTO: 88 FL (ref 82–98)
MONOCYTES # BLD AUTO: 0.6 K/UL (ref 0.3–1)
MONOCYTES NFR BLD: 10.4 % (ref 4–15)
NEUTROPHILS # BLD AUTO: 4.1 K/UL (ref 1.8–7.7)
NEUTROPHILS NFR BLD: 75.7 % (ref 38–73)
NRBC BLD-RTO: 0 /100 WBC
PHOSPHATE SERPL-MCNC: 2.7 MG/DL (ref 2.7–4.5)
PLATELET # BLD AUTO: 187 K/UL (ref 150–350)
PMV BLD AUTO: 10.6 FL (ref 9.2–12.9)
POTASSIUM SERPL-SCNC: 4.7 MMOL/L (ref 3.5–5.1)
PROT SERPL-MCNC: 5.3 G/DL (ref 6–8.4)
RBC # BLD AUTO: 3.43 M/UL (ref 4.6–6.2)
SODIUM SERPL-SCNC: 141 MMOL/L (ref 136–145)
WBC # BLD AUTO: 5.46 K/UL (ref 3.9–12.7)

## 2019-05-06 PROCEDURE — 77290 THER RAD SIMULAJ FIELD CPLX: CPT | Mod: TC | Performed by: RADIOLOGY

## 2019-05-06 PROCEDURE — 25000003 PHARM REV CODE 250: Performed by: PHYSICIAN ASSISTANT

## 2019-05-06 PROCEDURE — 25000003 PHARM REV CODE 250: Performed by: HOSPITALIST

## 2019-05-06 PROCEDURE — 77334 RADIATION TREATMENT AID(S): CPT | Mod: 26,,, | Performed by: RADIOLOGY

## 2019-05-06 PROCEDURE — 63600175 PHARM REV CODE 636 W HCPCS: Performed by: STUDENT IN AN ORGANIZED HEALTH CARE EDUCATION/TRAINING PROGRAM

## 2019-05-06 PROCEDURE — 25000003 PHARM REV CODE 250: Performed by: STUDENT IN AN ORGANIZED HEALTH CARE EDUCATION/TRAINING PROGRAM

## 2019-05-06 PROCEDURE — 77334 PR  RADN TREATMENT AID(S) COMPLX: ICD-10-PCS | Mod: 26,,, | Performed by: RADIOLOGY

## 2019-05-06 PROCEDURE — 77300 PR RADIATION THERAPY,DOSIMETRY PLAN: ICD-10-PCS | Mod: 26,,, | Performed by: RADIOLOGY

## 2019-05-06 PROCEDURE — 77295 3-D RADIOTHERAPY PLAN: CPT | Mod: TC | Performed by: RADIOLOGY

## 2019-05-06 PROCEDURE — 77387 GUIDANCE FOR RADJ TX DLVR: CPT | Mod: TC | Performed by: RADIOLOGY

## 2019-05-06 PROCEDURE — 77263 THER RADIOLOGY TX PLNG CPLX: CPT | Mod: ,,, | Performed by: RADIOLOGY

## 2019-05-06 PROCEDURE — 77300 RADIATION THERAPY DOSE PLAN: CPT | Mod: 26,,, | Performed by: RADIOLOGY

## 2019-05-06 PROCEDURE — 77014 HC CT GUIDANCE RADIATION THERAPY FLDS PLACEMENT: CPT | Mod: TC | Performed by: RADIOLOGY

## 2019-05-06 PROCEDURE — 77412 RADIATION TX DELIVERY LVL 3: CPT | Performed by: RADIOLOGY

## 2019-05-06 PROCEDURE — G6002 STEREOSCOPIC X-RAY GUIDANCE: HCPCS | Mod: 26,,, | Performed by: RADIOLOGY

## 2019-05-06 PROCEDURE — 20600001 HC STEP DOWN PRIVATE ROOM

## 2019-05-06 PROCEDURE — 84100 ASSAY OF PHOSPHORUS: CPT

## 2019-05-06 PROCEDURE — 77334 RADIATION TREATMENT AID(S): CPT | Mod: TC | Performed by: RADIOLOGY

## 2019-05-06 PROCEDURE — 77295 PR 3D RADIOTHERAPY PLAN: ICD-10-PCS | Mod: 26,,, | Performed by: RADIOLOGY

## 2019-05-06 PROCEDURE — 83735 ASSAY OF MAGNESIUM: CPT

## 2019-05-06 PROCEDURE — 77263 PR  RADIATION THERAPY PLAN COMPLEX: ICD-10-PCS | Mod: ,,, | Performed by: RADIOLOGY

## 2019-05-06 PROCEDURE — 99233 SBSQ HOSP IP/OBS HIGH 50: CPT | Mod: ,,, | Performed by: HOSPITALIST

## 2019-05-06 PROCEDURE — 77417 THER RADIOLOGY PORT IMAGE(S): CPT | Performed by: RADIOLOGY

## 2019-05-06 PROCEDURE — 77295 3-D RADIOTHERAPY PLAN: CPT | Mod: 26,,, | Performed by: RADIOLOGY

## 2019-05-06 PROCEDURE — G6002 PR STEREOSCOPIC XRAY GUIDE FOR RADIATION TX DELIV: ICD-10-PCS | Mod: 26,,, | Performed by: RADIOLOGY

## 2019-05-06 PROCEDURE — 99233 PR SUBSEQUENT HOSPITAL CARE,LEVL III: ICD-10-PCS | Mod: ,,, | Performed by: HOSPITALIST

## 2019-05-06 PROCEDURE — 77300 RADIATION THERAPY DOSE PLAN: CPT | Mod: TC | Performed by: RADIOLOGY

## 2019-05-06 PROCEDURE — S0028 INJECTION, FAMOTIDINE, 20 MG: HCPCS | Performed by: HOSPITALIST

## 2019-05-06 PROCEDURE — 85025 COMPLETE CBC W/AUTO DIFF WBC: CPT

## 2019-05-06 PROCEDURE — 80053 COMPREHEN METABOLIC PANEL: CPT

## 2019-05-06 PROCEDURE — 36415 COLL VENOUS BLD VENIPUNCTURE: CPT

## 2019-05-06 RX ORDER — FAMOTIDINE 10 MG/ML
20 INJECTION INTRAVENOUS ONCE
Status: COMPLETED | OUTPATIENT
Start: 2019-05-06 | End: 2019-05-06

## 2019-05-06 RX ADMIN — HYDRALAZINE HYDROCHLORIDE 25 MG: 25 TABLET, FILM COATED ORAL at 12:05

## 2019-05-06 RX ADMIN — PANTOPRAZOLE SODIUM 40 MG: 40 TABLET, DELAYED RELEASE ORAL at 04:05

## 2019-05-06 RX ADMIN — ONDANSETRON 4 MG: 2 SOLUTION INTRAMUSCULAR; INTRAVENOUS at 09:05

## 2019-05-06 RX ADMIN — OXYCODONE HYDROCHLORIDE AND ACETAMINOPHEN 1 TABLET: 5; 325 TABLET ORAL at 04:05

## 2019-05-06 RX ADMIN — SODIUM CHLORIDE, SODIUM LACTATE, POTASSIUM CHLORIDE, AND CALCIUM CHLORIDE 100 ML/HR: 600; 310; 30; 20 INJECTION, SOLUTION INTRAVENOUS at 12:05

## 2019-05-06 RX ADMIN — TAMSULOSIN HYDROCHLORIDE 0.4 MG: 0.4 CAPSULE ORAL at 09:05

## 2019-05-06 RX ADMIN — HYDRALAZINE HYDROCHLORIDE 25 MG: 25 TABLET, FILM COATED ORAL at 09:05

## 2019-05-06 RX ADMIN — PANTOPRAZOLE SODIUM 40 MG: 40 TABLET, DELAYED RELEASE ORAL at 07:05

## 2019-05-06 RX ADMIN — FAMOTIDINE 20 MG: 10 INJECTION, SOLUTION INTRAVENOUS at 09:05

## 2019-05-06 NOTE — PROGRESS NOTES
Patient symptoms discussed with Dr. Rosendo Panchal concerning tube feeding. Instructed to restart tube feeds at 10 ml/hr. Will await further instructions and continue to monitor.

## 2019-05-06 NOTE — PLAN OF CARE
05/06/19 1329   Post-Acute Status   Post-Acute Authorization Placement   Post-Acute Placement Status Referrals Sent     Referral faxed for Home Infusion Tube feedings.

## 2019-05-06 NOTE — PLAN OF CARE
Problem: Adult Inpatient Plan of Care  Goal: Plan of Care Review      Recommendations    Recommendation/Intervention:     1. Current TF meeting <70% of needs.    Recommend TF of Isosource 1.5 advancing as tolerated to goal rate of 60 mL/hr to provide 2160 kcal (98% EEN), 98 gm protein (100% EPN), and 1100 mL free fluid.     2. Continue Full Liquid diet as tolerated.     3. RD following.     Goals: meet % of EEN/EPN  Nutrition Goal Status: new

## 2019-05-06 NOTE — PLAN OF CARE
Problem: Adult Inpatient Plan of Care  Goal: Plan of Care Review  Outcome: Ongoing (interventions implemented as appropriate)  Plan of care reviewed with patient. Verbalizes understanding. AAOx4. BP elevated. Ordered PRN hydralazine given followed up with pain medicine. Pain management adequate. Patient complained of nausea and gastric reflux at the beginning of shift but has since then reported some relief. Patient has been off tube feeds for most of the shift due to ordered procedures that required patient leaving unit; tube feedings are continued to be held at this time. After returning patient has reported feeling slightly better. Discharge from J tube has been yellow. Flushed as ordered. IV fluids maintained at ordered rate. 1 BM reported with minimal urine output. Patient anxious about upcoming procedures; instructed to call with any other questions or concerns. JEANINE.

## 2019-05-06 NOTE — PROGRESS NOTES
"[facility]  Adult Nutrition  Progress Note    SUMMARY       Recommendations    Recommendation/Intervention:     1. Current TF meeting <70% of needs.    Recommend TF of Isosource 1.5 advancing as tolerated to goal rate of 60 mL/hr to provide 2160 kcal (98% EEN), 98 gm protein (100% EPN), and 1100 mL free fluid.     2. Continue Full Liquid diet as tolerated.     3. RD following.     Goals: meet % of EEN/EPN  Nutrition Goal Status: new  Communication of RD Recs: (POC)    Reason for Assessment    Reason For Assessment: new tube feeding, length of stay  Diagnosis: (Acute upper GI bleed)  Relevant Medical History: esophageal cancer, CKDIII, HTN, PAD  General Information Comments: Replaced J tube on 5/02/19. Plan for radiation and chemo. Full Liquid diet, TF @ 40 mL/hr. TF held at time of visit. Pt denies N/V/D/C. C/o acid reflux. Reports excellent appetite PTA. Denies wt loss. UBW 172lbs. NFPE not warranted. Pt appears nourished. RD does not feel that pt meets malnutrition criteria at this time.   Nutrition Discharge Planning: unableto determine at this time    Nutrition Risk Screen    Nutrition Risk Screen: tube feeding or parenteral nutrition    Nutrition/Diet History    Spiritual, Cultural Beliefs, Worship Practices, Values that Affect Care: other (see comments)  Factors Affecting Nutritional Intake: decreased appetite(acid reflux)    Anthropometrics    Temp: 98.7 °F (37.1 °C)  Height Method: Stated  Height: 5' 10" (177.8 cm)  Height (inches): 70 in  Weight Method: Bed Scale  Weight: 81 kg (178 lb 9.2 oz)  Weight (lb): 178.57 lb  Ideal Body Weight (IBW), Male: 166 lb  % Ideal Body Weight, Male (lb): 107.57 lb  BMI (Calculated): 25.7  BMI Grade: 25 - 29.9 - overweight     Lab/Procedures/Meds    Pertinent Labs Reviewed: reviewed  Pertinent Labs Comments: glucose 136, T bili 1.1  Pertinent Medications Reviewed: reviewed  Pertinent Medications Comments: pantoprazole, lactated " ringers    Estimated/Assessed Needs    Weight Used For Calorie Calculations: 81 kg (178 lb 9.2 oz)  Energy Calorie Requirements (kcal): 7814-2722 kcal/day  Energy Need Method: Kcal/kg(25-30)  Protein Requirements: 81-97 gm/day(1.0-1.2 gm/kg)  Weight Used For Protein Calculations: 81 kg (178 lb 9.2 oz)  Fluid Requirements (mL): 1 mL/kcal or per MD     RDA Method (mL): 2025     Nutrition Prescription Ordered    Current Diet Order: Full Liquid  Nutrition Order Comments: TF currently on hold  Current Nutrition Support Formula Ordered: Isosource 1.5  Current Nutrition Support Rate Ordered: 40 (ml)  Current Nutrition Support Frequency Ordered: mL/hr    Evaluation of Received Nutrient/Fluid Intake    Enteral Calories (kcal): 1440  Enteral Protein (gm): 65  Enteral (Free Water) Fluid (mL): 733  % Kcal Needs: 65  % Protein Needs: 67  I/O: +4.2L x 24 hrs, +14.9L since admit  Energy Calories Required: not meeting needs  Protein Required: not meeting needs  Fluid Required: other (see comments)(per MD)  Tolerance: tolerating  % Intake of Estimated Energy Needs: 50 - 75 %  % Meal Intake: 0 - 25 %    Nutrition Risk    Level of Risk/Frequency of Follow-up: high(f/u 2 x wk)     Assessment and Plan     Nutrition Problem  Altered GI Function     Related to (etiology):   esophageal cancer    Signs and Symptoms (as evidenced by):   Pt with need for TF to meet needs.     Interventions:  Collaboration and Referral of Nutrition Care  Enteral Nutrition    Nutrition Diagnosis Status:   New     Monitor and Evaluation    Food and Nutrient Intake: energy intake, food and beverage intake, enteral nutrition intake  Food and Nutrient Adminstration: diet order, enteral and parenteral nutrition administration  Physical Activity and Function: nutrition-related ADLs and IADLs  Anthropometric Measurements: weight, weight change, body mass index  Biochemical Data, Medical Tests and Procedures: electrolyte and renal panel, gastrointestinal profile,  glucose/endocrine profile, inflammatory profile, lipid profile  Nutrition-Focused Physical Findings: overall appearance     Nutrition Follow-Up    RD Follow-up?: Yes

## 2019-05-07 ENCOUNTER — PATIENT MESSAGE (OUTPATIENT)
Dept: HEMATOLOGY/ONCOLOGY | Facility: CLINIC | Age: 69
End: 2019-05-07

## 2019-05-07 ENCOUNTER — DOCUMENTATION ONLY (OUTPATIENT)
Dept: RADIATION THERAPY | Facility: HOSPITAL | Age: 69
End: 2019-05-07

## 2019-05-07 ENCOUNTER — TELEPHONE (OUTPATIENT)
Dept: HEMATOLOGY/ONCOLOGY | Facility: CLINIC | Age: 69
End: 2019-05-07

## 2019-05-07 VITALS
RESPIRATION RATE: 18 BRPM | DIASTOLIC BLOOD PRESSURE: 89 MMHG | HEIGHT: 70 IN | BODY MASS INDEX: 25.56 KG/M2 | WEIGHT: 178.56 LBS | SYSTOLIC BLOOD PRESSURE: 191 MMHG | TEMPERATURE: 99 F | HEART RATE: 93 BPM | OXYGEN SATURATION: 95 %

## 2019-05-07 DIAGNOSIS — C15.9 ESOPHAGEAL CANCER, STAGE IIA: ICD-10-CM

## 2019-05-07 LAB
ALBUMIN SERPL BCP-MCNC: 2.5 G/DL (ref 3.5–5.2)
ALP SERPL-CCNC: 66 U/L (ref 55–135)
ALT SERPL W/O P-5'-P-CCNC: 19 U/L (ref 10–44)
ANION GAP SERPL CALC-SCNC: 7 MMOL/L (ref 8–16)
AST SERPL-CCNC: 34 U/L (ref 10–40)
BASOPHILS # BLD AUTO: 0.03 K/UL (ref 0–0.2)
BASOPHILS NFR BLD: 0.5 % (ref 0–1.9)
BILIRUB SERPL-MCNC: 0.5 MG/DL (ref 0.1–1)
BUN SERPL-MCNC: 19 MG/DL (ref 8–23)
CALCIUM SERPL-MCNC: 8.4 MG/DL (ref 8.7–10.5)
CHLORIDE SERPL-SCNC: 105 MMOL/L (ref 95–110)
CO2 SERPL-SCNC: 26 MMOL/L (ref 23–29)
CREAT SERPL-MCNC: 1.1 MG/DL (ref 0.5–1.4)
DIFFERENTIAL METHOD: ABNORMAL
EOSINOPHIL # BLD AUTO: 0.1 K/UL (ref 0–0.5)
EOSINOPHIL NFR BLD: 1 % (ref 0–8)
ERYTHROCYTE [DISTWIDTH] IN BLOOD BY AUTOMATED COUNT: 15.8 % (ref 11.5–14.5)
EST. GFR  (AFRICAN AMERICAN): >60 ML/MIN/1.73 M^2
EST. GFR  (NON AFRICAN AMERICAN): >60 ML/MIN/1.73 M^2
GLUCOSE SERPL-MCNC: 97 MG/DL (ref 70–110)
HCT VFR BLD AUTO: 28.9 % (ref 40–54)
HGB BLD-MCNC: 9.1 G/DL (ref 14–18)
IMM GRANULOCYTES # BLD AUTO: 0.03 K/UL (ref 0–0.04)
IMM GRANULOCYTES NFR BLD AUTO: 0.5 % (ref 0–0.5)
LYMPHOCYTES # BLD AUTO: 0.7 K/UL (ref 1–4.8)
LYMPHOCYTES NFR BLD: 11.4 % (ref 18–48)
MAGNESIUM SERPL-MCNC: 1.8 MG/DL (ref 1.6–2.6)
MCH RBC QN AUTO: 27.7 PG (ref 27–31)
MCHC RBC AUTO-ENTMCNC: 31.5 G/DL (ref 32–36)
MCV RBC AUTO: 88 FL (ref 82–98)
MONOCYTES # BLD AUTO: 0.5 K/UL (ref 0.3–1)
MONOCYTES NFR BLD: 8.4 % (ref 4–15)
NEUTROPHILS # BLD AUTO: 4.9 K/UL (ref 1.8–7.7)
NEUTROPHILS NFR BLD: 78.2 % (ref 38–73)
NRBC BLD-RTO: 0 /100 WBC
PHOSPHATE SERPL-MCNC: 3.2 MG/DL (ref 2.7–4.5)
PLATELET # BLD AUTO: 172 K/UL (ref 150–350)
PMV BLD AUTO: 10.6 FL (ref 9.2–12.9)
POTASSIUM SERPL-SCNC: 5 MMOL/L (ref 3.5–5.1)
PROT SERPL-MCNC: 5.2 G/DL (ref 6–8.4)
RBC # BLD AUTO: 3.28 M/UL (ref 4.6–6.2)
SODIUM SERPL-SCNC: 138 MMOL/L (ref 136–145)
WBC # BLD AUTO: 6.22 K/UL (ref 3.9–12.7)

## 2019-05-07 PROCEDURE — 99239 HOSP IP/OBS DSCHRG MGMT >30: CPT | Mod: ,,, | Performed by: HOSPITALIST

## 2019-05-07 PROCEDURE — 94761 N-INVAS EAR/PLS OXIMETRY MLT: CPT

## 2019-05-07 PROCEDURE — 25000003 PHARM REV CODE 250: Performed by: PHYSICIAN ASSISTANT

## 2019-05-07 PROCEDURE — 25000003 PHARM REV CODE 250: Performed by: STUDENT IN AN ORGANIZED HEALTH CARE EDUCATION/TRAINING PROGRAM

## 2019-05-07 PROCEDURE — 97165 OT EVAL LOW COMPLEX 30 MIN: CPT

## 2019-05-07 PROCEDURE — 25000003 PHARM REV CODE 250: Performed by: HOSPITALIST

## 2019-05-07 PROCEDURE — 99900037 HC PT THERAPY SCREENING (STAT)

## 2019-05-07 PROCEDURE — 84100 ASSAY OF PHOSPHORUS: CPT

## 2019-05-07 PROCEDURE — 85025 COMPLETE CBC W/AUTO DIFF WBC: CPT

## 2019-05-07 PROCEDURE — 99239 PR HOSPITAL DISCHARGE DAY,>30 MIN: ICD-10-PCS | Mod: ,,, | Performed by: HOSPITALIST

## 2019-05-07 PROCEDURE — 83735 ASSAY OF MAGNESIUM: CPT

## 2019-05-07 PROCEDURE — 80053 COMPREHEN METABOLIC PANEL: CPT

## 2019-05-07 PROCEDURE — 36415 COLL VENOUS BLD VENIPUNCTURE: CPT

## 2019-05-07 RX ORDER — OXYCODONE HYDROCHLORIDE 5 MG/1
5 TABLET ORAL EVERY 6 HOURS PRN
Qty: 28 TABLET | Refills: 0 | Status: SHIPPED | OUTPATIENT
Start: 2019-05-07

## 2019-05-07 RX ORDER — PANTOPRAZOLE SODIUM 40 MG/1
40 TABLET, DELAYED RELEASE ORAL
Qty: 60 TABLET | Refills: 11 | Status: SHIPPED | OUTPATIENT
Start: 2019-05-07 | End: 2019-06-04

## 2019-05-07 RX ADMIN — TAMSULOSIN HYDROCHLORIDE 0.4 MG: 0.4 CAPSULE ORAL at 10:05

## 2019-05-07 RX ADMIN — HYDRALAZINE HYDROCHLORIDE 25 MG: 25 TABLET, FILM COATED ORAL at 04:05

## 2019-05-07 RX ADMIN — PANTOPRAZOLE SODIUM 40 MG: 40 TABLET, DELAYED RELEASE ORAL at 04:05

## 2019-05-07 RX ADMIN — SODIUM CHLORIDE, SODIUM LACTATE, POTASSIUM CHLORIDE, AND CALCIUM CHLORIDE 100 ML/HR: 600; 310; 30; 20 INJECTION, SOLUTION INTRAVENOUS at 12:05

## 2019-05-07 RX ADMIN — PANTOPRAZOLE SODIUM 40 MG: 40 TABLET, DELAYED RELEASE ORAL at 07:05

## 2019-05-07 NOTE — PT/OT/SLP EVAL
Occupational Therapy   Evaluation and Discharge Note    Name: Brooks Canas  MRN: [id number]  Admitting Diagnosis:  Acute upper GI bleed 4 Days Post-Op    Recommendations:     Discharge Recommendations: home  Discharge Equipment Recommendations:  none  Barriers to discharge:  None    Assessment:     Brooks Canas is a 69 y.o. male with a medical diagnosis of Acute upper GI bleed. At this time, patient is functioning at their prior level of function and does not require further acute OT services.     Plan:     During this hospitalization, patient does not require further acute OT services.  Please re-consult if situation changes.    · Plan of Care Reviewed with: patient    Subjective     Chief Complaint: abdominal discomfort, nausea  Patient/Family Comments/goals: return to PLOF    Occupational Profile:  Living Environment: Pt lives with a roommate in a duplex home with 5STE and LHR. Home has tub/shower  Previous level of function: PTA, pt reports independence with all ADL and IADL, including driving. Pt was not using any AD for ambulation or ADL. Pt works part-time as  for [employer].  Roles and Routines: roommate,   Equipment Used at home:  (owns SPC, BSC, and RW but does not use)  Assistance upon Discharge: Roommate can assist as needed following d/c     Pain/Comfort:  · Pain Rating 1: 0/10  · Pain Addressed 1: Reposition, Distraction, Cessation of Activity  · Pain Rating Post-Intervention 1: 0/10    Patients cultural, spiritual, Muslim conflicts given the current situation: no    Objective:     Communicated with: RN prior to session.  Patient found HOB elevated with peripheral IV(J-tube) upon OT entry to room.    General Precautions: Standard, fall   Orthopedic Precautions:N/A   Braces: N/A     Occupational Performance:    Bed Mobility:    · Patient completed Scooting/Bridging with modified independence and with side rail  · Patient completed Supine to Sit with modified  independence and with side rail    Functional Mobility/Transfers:  · Patient completed Sit <> Stand Transfer with independence  with  no assistive device   · Patient completed Bed <> Chair Transfer using Step Transfer technique with independence with no assistive device  · Functional Mobility: Pt ambulate 350 ft with independence while managing own IV pole    Activities of Daily Living:  · Lower Body Dressing: independence to manage B socks while seated EOB  · Toileting: Pt reports independence with toileting in bathroom    Cognitive/Visual Perceptual:  Cognitive/Psychosocial Skills:     -       Oriented to: Person, Place, Time and Situation   -       Follows Commands/attention:Follows multistep  commands  -       Communication: clear/fluent  -       Memory: No Deficits noted  -       Safety awareness/insight to disability: intact   -       Mood/Affect/Coping skills/emotional control: Appropriate to situation  Visual/Perceptual:      -Pt wears glasses; hearing intact    Physical Exam:  Balance:    -       good sitting/standing balance  Postural examination/scapula alignment:    -       No postural abnormalities identified  Skin integrity: Visible skin intact  Edema:  None noted  Sensation:    -       Impaired  Pt reports B foot numbness/tingling at baseline  Dominant hand:    -       R hand  Upper Extremity Range of Motion:     -       Right Upper Extremity: WFL  -       Left Upper Extremity: WFL  Upper Extremity Strength:    -       Right Upper Extremity: WFL  -       Left Upper Extremity: WFL   Strength:    -       Right Upper Extremity: WFL  -       Left Upper Extremity: WFL  Fine Motor Coordination:    -       Intact  Gross motor coordination:   WFL    AMPAC 6 Click ADL:  AMPAC Total Score: 24    Treatment & Education:  Pt educated on role of OT/POC  Pt educated on importance of ambulation/UIC  White board/communication board updated  Education:    Patient left up in chair with all lines intact, call button  in reach and RN notified    GOALS:   Multidisciplinary Problems     Occupational Therapy Goals     Not on file          Multidisciplinary Problems (Resolved)        Problem: Occupational Therapy Goal    Goal Priority Disciplines Outcome Interventions   Occupational Therapy Goal   (Resolved)     OT, PT/OT Outcome(s) achieved                    History:     Past Medical History:   Diagnosis Date    BPH (benign prostatic hyperplasia)     Chronic kidney disease, stage III (moderate) 3/1/2016    12/10/2015: BUN/crea: 32/1.63. CrCl 43.    Claudication in peripheral vascular disease     History of tobacco use 11/4/2016    1970: Began smoking.   3/1/2016: Quit smoking.    Hypertension, benign 3/1/2016    2012: Diagnosed.    Hyperuricemia     Leg swelling 3/14/2016    3/4/2016: Began having right leg edema.    PAD (peripheral artery disease)     Peripheral artery disease 3/1/2016    2014: Began experience bilateral calf claudication.  1/2016: [facility]: Arterial Duplex: Right: SFA: distal severe. RAVI 0.78. Left: SFA: mid occlusion. RAVI 0.65. 2/1/2016: Began experience pain in right great toe.    Tobacco use 3/1/2016    1970: Began smoking. Unable to quit.    Vitamin D deficiency disease        Past Surgical History:   Procedure Laterality Date    ANGIOGRAM-EXTREMITY-LOWER Bilateral 3/2/2016    Performed by Phoenix Ayers [facility] at [facility] CATH LAB    AORTOGRAM WITH RUNOFF Bilateral 5/4/2017    Performed by Phoenix Ayers MD at [facility] CATH LAB    APPENDECTOMY      KPIILB-[id number] Right 3/4/2016    Performed by Aris Finney Jr., MD at [facility] OR    CATARACT EXTRACTION Right     EGD (ESOPHAGOGASTRODUODENOSCOPY) Left 4/27/2019    Performed by Carine Le MD at [facility] ENDO    FEMORAL BYPASS Right 2016    INSERTION, JEJUNOSTOMY TUBE N/A 5/1/2019    Performed by [facility] G [facility]ell [facility] at [facility] OR 2ND FLR    LAPAROTOMY, EXPLORATORY N/A 5/2/2019    Performed by Everardo Smith MD at [facility] OR 2ND FLR    REVISION-  Jejunostomy N/A 5/2/2019    Performed by [facility]ardo [facility] at [facility] OR 2ND FLR    ULTRASOUND, UPPER GI TRACT, ENDOSCOPIC N/A 5/3/2019    Performed by Pacheco Ferreira MD at [facility] ENDO (2ND FLR)       Time Tracking:     OT Date of Treatment: 05/07/19  OT Start Time: 0747  OT Stop Time: 0758  OT Total Time (min): 11 min    Billable Minutes:Evaluation 11    Manuela Martinez OT  5/7/2019

## 2019-05-07 NOTE — PLAN OF CARE
START ON PATHWAY REGIMEN - Gastroesophageal    [id number]        Paclitaxel (Taxol(R))       Carboplatin (Paraplatin(R))           Additional Orders: Given with concurrent chemoradiotherapy.  GCSF   therapy with RT is a relative contraindication.    **Always confirm dose/schedule in your pharmacy ordering system**        Patient Characteristics:  Esophageal & GE Junction, Adenocarcinoma, Preoperative or Nonsurgical Candidate   (Clinical Staging), cT3 or Higher or cN+, Surgical Candidate (Up to cT4a) -   Preoperative Therapy, Esophageal  Histology: Adenocarcinoma  Disease Classification: Esophageal  Therapeutic Status: Preoperative or Nonsurgical Candidate (Clinical Staging)  AJCC Grade: Staged < 8th Ed.  AJCC 8 Stage Grouping: Staged < 8th Ed.  AJCC T Category: Staged < 8th Ed.  AJCC N Category: Staged < 8th Ed.  AJCC M Category: Staged < 8th Ed.  Intent of Therapy:  Curative Intent, Discussed with Patient

## 2019-05-07 NOTE — PLAN OF CARE
Problem: Fall Injury Risk  Goal: Absence of Fall and Fall-Related Injury  Outcome: Ongoing (interventions implemented as appropriate)  POC reviewed with pt. Verbalizes understanding. AAOx4. VSS. denies any pain at this time, tolerating diet and tube feedings which are infusing at the goal 10ml/hr. remains free of any falls or trauma.No BM noted this shift; flatus present. Fall and safety measures ongoing, Call light within reach. Will continue to monitor

## 2019-05-07 NOTE — PLAN OF CARE
Problem: Occupational Therapy Goal  Goal: Occupational Therapy Goal  Outcome: Outcome(s) achieved Date Met: 05/07/19  Eval completed; no OT needs    Comments: D/C OT 5/7/2019    Manuela Martinez, OT

## 2019-05-07 NOTE — PROGRESS NOTES
Brooks Canas has received *** out of *** radiation therapy treatments to a total dose of *** on ***.

## 2019-05-07 NOTE — PT/OT/SLP PROGRESS
Physical Therapy  Consult / Discontinued Orders    Patient Name:  Brooks Canas   MRN:  [id number]  Admitting Diagnosis: Acute upper GI bleed  Recent Surgery: Procedure(s) (LRB):  ULTRASOUND, UPPER GI TRACT, ENDOSCOPIC (N/A) 4 Days Post-Op    Physical Therapy orders received and acknowledged. Discussed pt case with evaulating OT, pt is independent with all mobility, no longer requires PT services. PT met with pt who also agrees he is independent with mobility with no concerns for returning home. Encouraged pt to ambulate in hallways 3x/day, up in chair during waking hours. Pt no longe requires acute PT services.     Shiloh Glez PT, DPT  1/24/2019  Pager: [phone]

## 2019-05-07 NOTE — PLAN OF CARE
05/07/19 1605   Final Note   Assessment Type Final Discharge Note   Anticipated Discharge Disposition Home-Health   What phone number can be called within the next 1-3 days to see how you are doing after discharge? [phone]   Hospital Follow Up  Appt(s) scheduled? Yes   Discharge plans and expectations educations in teach back method with documentation complete? Yes   Right Care Referral Info   Post Acute Recommendation Home-care   Referral Type Home Health/ home Infusion    Facility Name [facility]

## 2019-05-07 NOTE — TELEPHONE ENCOUNTER
Attempted to call patient to schedule appointment. No answer, mailbox is full. [facility] message sent.

## 2019-05-07 NOTE — PROGRESS NOTES
Hospital Medicine  Progress note    Team: [facility] R Griffin Garner MD  Admit Date: 4/26/2019  OLIVIA 5/7/2019  Length of Stay:  LOS: 10 days   Code status: Full Code    Principal Problem:  Acute upper GI bleed    Overview:    Interval hx:  Patient underwent radiation today, reports feeling slightly nauseous, pepcid IV ordered, bone scan revealed possible metastasis to humerus, will do Xray of the right arm.       ROS   Constitutional: Negative for fever.   Respiratory: Negative for shortness of breath.    Cardiovascular: Negative for chest pain, palpitations and leg swelling.   Gastrointestinal: Negative for abdominal pain, blood in stool, constipation, diarrhea, nausea and vomiting.   Neurological: Negative for dizziness and syncope.   Psychiatric/Behavioral: Negative for agitation, behavioral problems and confusion.          PEx  Temp:  [98.4 °F (36.9 °C)-99.2 °F (37.3 °C)]   Pulse:  [70-97]   Resp:  [17-20]   BP: (145-179)/(71-81)   SpO2:  [93 %-96 %]     Intake/Output Summary (Last 24 hours) at 5/6/2019 1954  Last data filed at 5/6/2019 1600  Gross per 24 hour   Intake 3496.33 ml   Output 700 ml   Net 2796.33 ml     Constitutional: He is oriented to person, place, and time. He appears well-developed and well-nourished. No distress.   HENT:   Head: Normocephalic and atraumatic.   Eyes: Conjunctivae and EOM are normal. No scleral icterus.   Neck: Normal range of motion.   Pulmonary/Chest: Effort normal and breath sounds normal. No respiratory distress.   Abdominal: Soft. Bowel sounds are normal. He exhibits no distension. There is no tenderness. There is no rebound and no guarding.   Musculoskeletal: Normal range of motion.   Neurological: He is alert and oriented to person, place, and time.   Skin: Skin is warm and dry. No rash noted. He is not diaphoretic. No erythema. No pallor.   Psychiatric: He has a normal mood and affect. His behavior is normal. Judgment and thought content normal.         Recent Labs    Lab 05/05/19  0336 05/05/19  1752 05/06/19  0928   WBC 3.68* 7.30 5.46   HGB 7.1* 9.2* 9.4*   HCT 22.6* 28.5* 30.1*    187 187     Recent Labs   Lab 05/04/19  0511 05/05/19  0336 05/06/19  0502    140 141   K 3.5 3.9 4.7    107 104   CO2 25 26 30*   BUN 11 14 19   CREATININE 1.3 1.2 1.1   * 142* 136*   CALCIUM 8.6* 8.1* 8.5*   MG 1.6 1.7 1.8   PHOS 2.4* 2.5* 2.7     Recent Labs   Lab 05/04/19 0511 05/05/19 0336 05/06/19  0502   ALKPHOS 67 69 67   ALT 8* 6* 11   AST 16 14 24   ALBUMIN 2.6* 2.1* 2.6*   PROT 5.3* 4.5* 5.3*   BILITOT 0.5 0.4 1.1*        No results for input(s): CPK, CPKMB, MB, TROPONINI in the last 72 hours.  No results for input(s): POCTGLUCOSE in the last 168 hours.  No results found for: HGBA1C    Scheduled Meds:   DUREZOL (DIFLUPREDNATE 0.05%) EYE DROPS (patient's own med)  1 drop Right Eye BID    ILEVO (NEPAFENAC 0.3%) EYE DROPS (patient's own med)  1 drop Right Eye Daily    pantoprazole  40 mg Oral BID AC    tamsulosin  0.4 mg Oral Daily     Continuous Infusions:   lactated ringers 100 mL/hr (05/06/19 1248)     As Needed:  sodium chloride, acetaminophen, hydrALAZINE, ondansetron, ondansetron, oxyCODONE-acetaminophen, oxyCODONE-acetaminophen, sodium chloride 0.9%, sodium chloride 0.9%, sodium chloride 0.9%    Active Hospital Problems    Diagnosis  POA    *Acute upper GI bleed [K92.2]  Yes    Weakness [R53.1]  Yes    Esophageal mass [K22.9]  Yes     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 CrCl 43.      Hypertension [I10]  Yes     2012: Diagnosed.        Resolved Hospital Problems   No resolved problems to display.         Assessment and Plan    Acute upper GI bleed  [facility]. Underwent EGD which found large circumferential mass of GEJ; biopsies taken.  -He  continues on PPI  -Continue H/H q12h.  Transfuse for Hb < 8.0  -Hold aspirin and plavix.  -Trial CLD, with advancement to soft diet tomorrow if remains stable.       Esophageal mass  Per endoscopy (4/27) report:          A large, fungating mass with no bleeding and stigmata of recent        bleeding was found in the lower third of the esophagus and at the        gastroesophageal junction, 32 cm from the incisors. The mass was        partially obstructing and partially circumferential (involving        one-half of the lumen circumference). Biopsies were taken with a        cold forceps for histology.     CTS consulted; appreciate assistance.   Oncology consult placed as patient will require neoadjuvant therapy prior to resection, per CTS; biopsy pathology pending.  AES performed EUS , mass determined to be T3N1  MRI Brain unremarkable, Bone scan pending, Radiation-Oncology consult saw patient, started radiation today, will need a total of 28  Open jejunostomy performed on 05/01/2019,revisited on 05/02 after dislodgement of the tube, started on tube feeds      Acute blood loss anemia  -Due to GI bleeding  -Received 1 unit PRBC 4/27  -Check iron, ferritin, b12 and folate  -Repeat h/h q12h, transfuse <7 g/dl.     Hypomagnesemia  -Replete prn     Chronic kidney disease, stage III (moderate)  -back to baseline      Hypertension  -Continue to hold home oral meds in the setting of hypotension and GIB. Resume when clinically appropriate.   -Order hydralazine PRN SBP > 170      High Risk Conditions  PAD, BPH, CKD stage III, HTN, hyperuricemia      Diet: Full liquid diet, ADAT, tube feeds    GI PPx:   DVT PPx:    CHARLES/SCD    Goals of Care: Full code  Discharge plan: pending PT/OT recs for placement vs home health, Radiation oncology recs     Time (minutes) spent in care of the patient (Greater than 1/2 spent in direct face-to-face contact) 35 minutes      Griffin Panchal MD  Staff Hospitalist  Department of Hospital  Medicine  [facility]   [phone]

## 2019-05-07 NOTE — PLAN OF CARE
[facility]    HOME HEALTH ORDERS  FACE TO FACE ENCOUNTER    Patient Name: Brooks Canas  YOB: 1950    PCP: Aris Sahu MD   PCP Address: [address] [place], [place] [id number]  PCP Phone Number: [phone]  PCP Fax: [phone]    Encounter Date: 05/07/2019    Admit to Home Health    Diagnoses:  Active Hospital Problems    Diagnosis  POA    *Acute upper GI bleed [K92.2]  Yes    Weakness [R53.1]  Yes    Esophageal mass [K22.9]  Yes     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 CrCl 43.      Hypertension [I10]  Yes     2012: Diagnosed.        Resolved Hospital Problems   No resolved problems to display.       Future Appointments   Date Time Provider Department Center   5/23/2019  9:00 AM Jose G Dowell MD [facility]   7/3/2019 10:30 AM Everardo Vásquez MD [facility]     Follow-up Information     Jose G Dowell MD In 2 weeks.    Specialties:  General Surgery, Surgery  Why:  For wound re-check  Contact information:  [address]  [facility] [id number] [phone]             Aris Sahu MD. Call in 1 week.    Specialty:  Internal Medicine  Why:  hospital f/u, coordination of care, new dx of cancer- call to schedule an appointment at earliest convience.   Contact information:  [address] [place] [address] [phone]             Everardo Vásquez MD In 8 weeks.    Specialty:  Cardiothoracic Surgery  Why:  esophageal cancer  Contact information:  [address]  [facility] [id number] [phone]             Schedule an appointment as soon as possible for a visit with Vijay Forrest MD.    Specialties:  Hematology and Oncology, Hematology  Why:  esophageal cancer- should call with appointment  time-please follow up   Contact information:  [address]  [facility] [address]  [phone]                   I have seen and examined this patient face to face today. My clinical findings that support the need for the home health skilled services and home bound status are the following:  Weakness/numbness causing balance and gait disturbance due to Malignancy/Cancer making it taxing to leave home.    Allergies:Review of patient's allergies indicates:  No Known Allergies    Diet: soft diet and tube feedings: see below     For TF, if patient is tolerating:  <25% of meals: Nocturnal Isosource 1.5, goal rate of 100 ml/h x12h  25%-75% of meals: Nocturnal Isosource 1.5, goal rate of 50 ml/h x12h  >75% of meals: No TF necessary.      Activities: activity as tolerated    Nursing:   SN to complete comprehensive assessment including routine vital signs. Instruct on disease process and s/s of complications to report to MD. Review/verify medication list sent home with the patient at time of discharge  and instruct patient/caregiver as needed. Frequency may be adjusted depending on start of care date.    Notify MD if SBP > 160 or < 90; DBP > 90 or < 50; HR > 120 or < 50; Temp > 101; Other:         CONSULTS:     to evaluate for community resources/long-range planning.    MISCELLANEOUS CARE:  PEG Care:  Instruct patient/caregiver to clean site.  Monitor skin integrity.    WOUND CARE ORDERS  n/a      Medications: Review discharge medications with patient and family and provide education.      Current Discharge Medication List      START taking these medications    Details   oxyCODONE (ROXICODONE) 5 MG immediate release tablet Take 1 tablet (5 mg total) by mouth every 6 (six) hours as needed for Pain.  Qty: 28 tablet, Refills: 0      pantoprazole (PROTONIX) 40 MG tablet Take 1 tablet (40 mg total) by mouth 2 (two) times daily before meals.  Qty: 60 tablet, Refills: 11         CONTINUE these medications which  have CHANGED    Details   besifloxacin (BESIVANCE) 0.6 % DrpS Place 1 drop into both eyes 3 (three) times daily. for 7 days         CONTINUE these medications which have NOT CHANGED    Details   nepafenac (ILEVRO) 0.3 % DrpS Apply 1 drop to eye once daily.      allopurinol (ZYLOPRIM) 100 MG tablet Take 100 mg by mouth once daily.  Refills: 3      amLODIPine (NORVASC) 10 MG tablet TAKE 1 TABLET(10 MG) BY MOUTH EVERY DAY  Qty: 90 tablet, Refills: 0    Associated Diagnoses: Essential hypertension      atorvastatin (LIPITOR) 40 MG tablet TAKE 1 TABLET BY MOUTH ONCE DAILY  Qty: 30 tablet, Refills: 0    Associated Diagnoses: Peripheral artery disease      benazepril (LOTENSIN) 20 MG tablet TAKE 1 TABLET(20 MG) BY MOUTH EVERY DAY  Qty: 90 tablet, Refills: 0    Associated Diagnoses: Essential hypertension      carvedilol (COREG) 6.25 MG tablet TAKE 1 TABLET(6.25 MG) BY MOUTH TWICE DAILY  Qty: 180 tablet, Refills: 0    Associated Diagnoses: Essential hypertension      tamsulosin (FLOMAX) 0.4 mg Cp24 Take 0.4 mg by mouth once daily.   Refills: 2         STOP taking these medications       difluprednate (DUREZOL) 0.05 % Drop ophthalmic solution Comments:   Reason for Stopping:         clopidogrel (PLAVIX) 75 mg tablet Comments:   Reason for Stopping:         clopidogrel (PLAVIX) 75 mg tablet Comments:   Reason for Stopping:               I certify that this patient is confined to his home and needs intermittent skilled nursing care.

## 2019-05-07 NOTE — TELEPHONE ENCOUNTER
----- Message from Bessie Heath RN sent at 5/7/2019 12:30 PM CDT -----  Contact: Pt   Daniel Phan, I have included Sumit in this message she has reached out to his patient for an appt  Thanks  Bessie  ----- Message -----  From: Emilie Ayers  Sent: 5/7/2019  12:23 PM  To: [facility] Navigation    I do not see the patient has been seen here before, can you assist me with this please.  ----- Message -----  From: Vivienne Rodas  Sent: 5/7/2019  11:56 AM  To: Emilie Ayers    Pt called to get a Hospital F/U  Callback#[phone]  Thank You  REYNOLD Rodas

## 2019-05-07 NOTE — NURSING
Pt discharged home. Discharge instructions verbally given and hard copy provided to pt. Hard copy prescription delivered bedside. Removed 20g IV with cath tip in place. Patient tolerated IV removal well with bleeding controlled. Patient remains free of falls with no acute pain or distress noted. Patient ambulated off floor with family with TPN home pump and paperwork.

## 2019-05-08 ENCOUNTER — HOSPITAL ENCOUNTER (EMERGENCY)
Facility: HOSPITAL | Age: 69
Discharge: HOME | End: 2019-05-08
Attending: EMERGENCY MEDICINE
Payer: MEDICARE

## 2019-05-08 VITALS
DIASTOLIC BLOOD PRESSURE: 82 MMHG | RESPIRATION RATE: 14 BRPM | OXYGEN SATURATION: 95 % | HEART RATE: 81 BPM | SYSTOLIC BLOOD PRESSURE: 166 MMHG | TEMPERATURE: 98 F

## 2019-05-08 DIAGNOSIS — C15.9 MALIGNANT NEOPLASM OF ESOPHAGUS, UNSPECIFIED LOCATION: ICD-10-CM

## 2019-05-08 DIAGNOSIS — L76.33 POSTOPERATIVE SEROMA OF SKIN AFTER DERMATOLOGIC PROCEDURE: Primary | ICD-10-CM

## 2019-05-08 DIAGNOSIS — D64.9 ANEMIA, UNSPECIFIED TYPE: ICD-10-CM

## 2019-05-08 DIAGNOSIS — S30.1XXA ABDOMINAL WALL SEROMA, INITIAL ENCOUNTER: ICD-10-CM

## 2019-05-08 LAB
BASOPHILS # BLD AUTO: 0.02 K/UL (ref 0–0.2)
BASOPHILS NFR BLD: 0.2 % (ref 0–1.9)
BUN SERPL-MCNC: 24 MG/DL (ref 6–30)
CHLORIDE SERPL-SCNC: 101 MMOL/L (ref 95–110)
CREAT SERPL-MCNC: 1.3 MG/DL (ref 0.5–1.4)
DIFFERENTIAL METHOD: ABNORMAL
EOSINOPHIL # BLD AUTO: 0 K/UL (ref 0–0.5)
EOSINOPHIL NFR BLD: 0.3 % (ref 0–8)
ERYTHROCYTE [DISTWIDTH] IN BLOOD BY AUTOMATED COUNT: 15.8 % (ref 11.5–14.5)
GLUCOSE SERPL-MCNC: 109 MG/DL (ref 70–110)
HCT VFR BLD AUTO: 28.4 % (ref 40–54)
HCT VFR BLD CALC: 24 %PCV (ref 36–54)
HGB BLD-MCNC: 9.1 G/DL (ref 14–18)
IMM GRANULOCYTES # BLD AUTO: 0.06 K/UL (ref 0–0.04)
IMM GRANULOCYTES NFR BLD AUTO: 0.5 % (ref 0–0.5)
LYMPHOCYTES # BLD AUTO: 0.7 K/UL (ref 1–4.8)
LYMPHOCYTES NFR BLD: 6.2 % (ref 18–48)
MCH RBC QN AUTO: 28 PG (ref 27–31)
MCHC RBC AUTO-ENTMCNC: 32 G/DL (ref 32–36)
MCV RBC AUTO: 87 FL (ref 82–98)
MONOCYTES # BLD AUTO: 0.9 K/UL (ref 0.3–1)
MONOCYTES NFR BLD: 7.6 % (ref 4–15)
NEUTROPHILS # BLD AUTO: 9.7 K/UL (ref 1.8–7.7)
NEUTROPHILS NFR BLD: 85.2 % (ref 38–73)
NRBC BLD-RTO: 0 /100 WBC
PLATELET # BLD AUTO: 205 K/UL (ref 150–350)
PMV BLD AUTO: 10.8 FL (ref 9.2–12.9)
POC IONIZED CALCIUM: 1.04 MMOL/L (ref 1.06–1.42)
POC TCO2 (MEASURED): 24 MMOL/L (ref 23–29)
POTASSIUM BLD-SCNC: 4.5 MMOL/L (ref 3.5–5.1)
RBC # BLD AUTO: 3.25 M/UL (ref 4.6–6.2)
SAMPLE: ABNORMAL
SODIUM BLD-SCNC: 136 MMOL/L (ref 136–145)
WBC # BLD AUTO: 11.34 K/UL (ref 3.9–12.7)

## 2019-05-08 PROCEDURE — G6002 PR STEREOSCOPIC XRAY GUIDE FOR RADIATION TX DELIV: ICD-10-PCS | Mod: 26,,, | Performed by: RADIOLOGY

## 2019-05-08 PROCEDURE — G6002 STEREOSCOPIC X-RAY GUIDANCE: HCPCS | Mod: 26,,, | Performed by: RADIOLOGY

## 2019-05-08 PROCEDURE — 99283 EMERGENCY DEPT VISIT LOW MDM: CPT | Mod: 25

## 2019-05-08 PROCEDURE — 99285 PR EMERGENCY DEPT VISIT,LEVEL V: ICD-10-PCS | Mod: ,,, | Performed by: EMERGENCY MEDICINE

## 2019-05-08 PROCEDURE — 85025 COMPLETE CBC W/AUTO DIFF WBC: CPT

## 2019-05-08 PROCEDURE — 77412 RADIATION TX DELIVERY LVL 3: CPT | Performed by: RADIOLOGY

## 2019-05-08 PROCEDURE — 82962 GLUCOSE BLOOD TEST: CPT

## 2019-05-08 PROCEDURE — 99285 EMERGENCY DEPT VISIT HI MDM: CPT | Mod: ,,, | Performed by: EMERGENCY MEDICINE

## 2019-05-08 PROCEDURE — 77387 GUIDANCE FOR RADJ TX DLVR: CPT | Mod: TC | Performed by: RADIOLOGY

## 2019-05-08 RX ORDER — CLINDAMYCIN PHOSPHATE 900 MG/50ML
900 INJECTION, SOLUTION INTRAVENOUS
Status: DISCONTINUED | OUTPATIENT
Start: 2019-05-08 | End: 2019-05-08

## 2019-05-08 NOTE — ED TRIAGE NOTES
Patient had feeding tube placed on lower intestine with midline abdominal incision on 5/1 and then revised on 5/2. Patient noticed serosangeous discharge from incision when he woke up this morning; staples intact. Estimated blood loss 3 tablespoons. Patient has stopped taking plavix 1 week ago. Patient has used feeding tube with no issues since surgery. Reports x1 episode of diarrhea yesterday. Denies fevers, chills, purulent drainage, no redness noted at the incision site, denies light-headedness, n/v, cp, sob, trauma.

## 2019-05-08 NOTE — ED PROVIDER NOTES
Encounter Date: 5/8/2019    SCRIBE #1 NOTE: I, Yuan Salamanca, am scribing for, and in the presence of,  Sharan Matt MD. I have scribed the entire note.       History     Chief Complaint   Patient presents with    Post-op Problem     Presents to ED c/o drainage from abdominal incision site. +serosanguinous drainage noted     Brooks Canas is a 69 y.o. male patient with history of HTN, CKD, PAD, recently diagnosed esophageal cancer, who presents to the Emergency Department for drainage from abd incision site with onset this morning. Pt was recently diagnosed with esophageal cancer and underwent an open j-tube procedure on 5/1. Tube came out and he had repeat ex-lap for on 5/2 for replacement. Yesterday and this morning, he began to have worsening reddish drainage from midline surgical wound at umbilicus. No associated pain. No purulent drainage. Pt denies any fever, vomiting, diarrhea, abd pain, CP, SOB, numbness, tingling, weakness, dysuria and all other sxs at this time.    The history is provided by the patient.     Review of patient's allergies indicates:  No Known Allergies  Past Medical History:   Diagnosis Date    BPH (benign prostatic hyperplasia)     Chronic kidney disease, stage III (moderate) 3/1/2016    12/10/2015: BUN/crea: 32/1.63. CrCl 43.    Claudication in peripheral vascular disease     History of tobacco use 11/4/2016    1970: Began smoking.   3/1/2016: Quit smoking.    Hypertension, benign 3/1/2016    2012: Diagnosed.    Hyperuricemia     Leg swelling 3/14/2016    3/4/2016: Began having right leg edema.    PAD (peripheral artery disease)     Peripheral artery disease 3/1/2016    2014: Began experience bilateral calf claudication.  1/2016: [facility]: Arterial Duplex: Right: SFA: distal severe. RAVI 0.78. Left: SFA: mid occlusion. RAVI 0.65. 2/1/2016: Began experience pain in right great toe.    Tobacco use 3/1/2016    1970: Began smoking. Unable to quit.    Vitamin D deficiency  disease      Past Surgical History:   Procedure Laterality Date    ANGIOGRAM-EXTREMITY-LOWER Bilateral 3/2/2016    Performed by Phoenix Ayers MD at [facility] CATH LAB    AORTOGRAM WITH RUNOFF Bilateral 5/4/2017    Performed by Phoenix Ayers MD at [facility] CATH LAB    APPENDECTOMY      MJOOOA-DDJCXCL-KABOFRNDE Right 3/4/2016    Performed by Aris Finney Jr., MD at [facility] OR    CATARACT EXTRACTION Right     EGD (ESOPHAGOGASTRODUODENOSCOPY) Left 4/27/2019    Performed by Carine Le MD at [facility] ENDO    FEMORAL BYPASS Right 2016    INSERTION, JEJUNOSTOMY TUBE N/A 5/1/2019    Performed by [facility]ell [facility] at [facility] OR 2ND FLR    LAPAROTOMY, EXPLORATORY N/A 5/2/2019    Performed by Everardo Smith MD at [facility] OR 2ND FLR    REVISION- Jejunostomy N/A 5/2/2019    Performed by Everardo Smith MD at [facility] OR 2ND FLR    ULTRASOUND, UPPER GI TRACT, ENDOSCOPIC N/A 5/3/2019    Performed by Pacheco Ferreira MD at [facility] ENDO (2ND FLR)     No family history on file.  Social History     Tobacco Use    Smoking status: Former Smoker     Packs/day: 0.75     Years: 46.00     Pack years: 34.50     Start date: 1/1/1970     Last attempt to quit: 3/1/2016     Years since quitting: 3.1    Smokeless tobacco: Never Used   Substance Use Topics    Alcohol use: No     Alcohol/week: 0.0 oz    Drug use: Not Currently     Review of Systems   Constitutional: Negative for fever.   HENT: Negative for sore throat.    Respiratory: Negative for shortness of breath.    Cardiovascular: Negative for chest pain.   Gastrointestinal: Negative for diarrhea, nausea and vomiting.   Genitourinary: Negative for dysuria.   Musculoskeletal: Negative for back pain.   Skin: Negative for rash.        Drainage from abd incision site.   Neurological: Negative for weakness.   Hematological: Does not bruise/bleed easily.       Physical Exam     Initial Vitals [05/08/19 0626]   BP Pulse Resp Temp SpO2   (!) 160/72 104 18 98.7 °F (37.1 °C) 96 %      MAP       --          Physical Exam    Nursing note and vitals reviewed.  Constitutional: He appears well-developed and well-nourished. He is not diaphoretic. No distress.   HENT:   Head: Normocephalic and atraumatic.   Right Ear: External ear normal.   Left Ear: External ear normal.   Eyes: Conjunctivae are normal.   Neck: Neck supple.   Cardiovascular: Normal rate, regular rhythm, normal heart sounds and intact distal pulses.   Pulmonary/Chest: Breath sounds normal. No respiratory distress. He has no wheezes. He has no rhonchi. He has no rales.   Abdominal: Soft. He exhibits no distension. There is no tenderness. There is no rebound and no guarding.   Midline surgical wound with staples in place. No erythema or purulent drainage. No TTP.   Neurological: He is alert and oriented to person, place, and time. GCS score is 15. GCS eye subscore is 4. GCS verbal subscore is 5. GCS motor subscore is 6.   Skin: Skin is warm. Capillary refill takes less than 2 seconds. No rash noted. No erythema.   Significant amount of serosanguinous discharge from inferior portion of midline abd wound around umbilicus. Able to express copious amounts of drainage. Wound otherwise appears well, without surrounding erythema, purulent drainage, or edema.   Psychiatric: He has a normal mood and affect.         ED Course   Procedures  Labs Reviewed   CBC W/ AUTO DIFFERENTIAL - Abnormal; Notable for the following components:       Result Value    RBC 3.25 (*)     Hemoglobin 9.1 (*)     Hematocrit 28.4 (*)     RDW 15.8 (*)     Gran # (ANC) 9.7 (*)     Immature Grans (Abs) 0.06 (*)     Lymph # 0.7 (*)     Gran% 85.2 (*)     Lymph% 6.2 (*)     All other components within normal limits   POCT GLUCOSE - Abnormal; Notable for the following components:    POC Ionized Calcium 1.04 (*)     POC Hematocrit 24 (*)     All other components within normal limits          Imaging Results    None          Medical Decision Making:   History:   Old Medical Records: I decided to  obtain old medical records.  Old Records Summarized: records from clinic visits and records from previous admission(s).  Clinical Tests:   Lab Tests: Ordered and Reviewed  ED Management:  Vitals normal. Afebrile. Tachycardic in triage but not on exam. Here w/ abd wound drainage. Appears well. No purulence or evidence of infection. Will obtain CBC and istat Chem 8+. Discussed with general surgery who will evaluate due to recent surgery.    Labs reviewed. H/h low but unchanged. Continues to be in NAD with normal vitals.   Discussed with gen combs who agrees this is likely post operative seroma. Recommend d/c w/ f/u as scheduled.    Stable for discharge at this time. Return precautions discussed.   Other:   I have discussed this case with another health care provider.       <> Summary of the Discussion: General surgery.            Scribe Attestation:   Scribe #1: I performed the above scribed service and the documentation accurately describes the services I performed. I attest to the accuracy of the note.               Clinical Impression:       ICD-10-CM ICD-9-CM   1. Postoperative seroma of skin after dermatologic procedure L76.33 998.13   2. Malignant neoplasm of esophagus, unspecified location C15.9 150.9   3. Abdominal wall seroma, initial encounter T88.8XXA 998.13    T79.2XXA    4. Anemia, unspecified type D64.9 285.9         Disposition:   Disposition: Discharged  Condition: Stable                        Sharan Matt MD  05/08/19 4116

## 2019-05-08 NOTE — CONSULTS
[facility]  Attending: Luis  Resident: Brando   CC: Incisional drainage    HPI: Brooks Canas is a pleasant 69 y.o. man, recently diagnosed esophageal cancer, who had open J tube placed 5/1, and required ex-lap for replacement after inadvertent pulling out on 5/2, who presents to ED for drainage from incision.    Both surgeries were done by Dr. Smith.  The patient denies any increasing pain from the site, and denies any fevers.  He noted a large amount of serous drainage and wanted to get checked out.    Past Medical History:   Diagnosis Date    BPH (benign prostatic hyperplasia)     Chronic kidney disease, stage III (moderate) 3/1/2016    12/10/2015: BUN/crea: 32/1.63. CrCl 43.    Claudication in peripheral vascular disease     History of tobacco use 11/4/2016    1970: Began smoking.   3/1/2016: Quit smoking.    Hypertension, benign 3/1/2016    2012: Diagnosed.    Hyperuricemia     Leg swelling 3/14/2016    3/4/2016: Began having right leg edema.    PAD (peripheral artery disease)     Peripheral artery disease 3/1/2016    2014: Began experience bilateral calf claudication.  1/2016: [facility]: Arterial Duplex: Right: SFA: distal severe. RAVI 0.78. Left: SFA: mid occlusion. RAVI 0.65. 2/1/2016: Began experience pain in right great toe.    Tobacco use 3/1/2016    1970: Began smoking. Unable to quit.    Vitamin D deficiency disease        Past Surgical History:   Procedure Laterality Date    ANGIOGRAM-EXTREMITY-LOWER Bilateral 3/2/2016    Performed by Phoenix Ayers MD at [facility] CATH LAB    AORTOGRAM WITH RUNOFF Bilateral 5/4/2017    Performed by Phoenix Ayers MD at [facility] CATH LAB    APPENDECTOMY      ZKRHSX-TKBTJEM-ZVGMHAQZD Right 3/4/2016    Performed by Aris Finney Jr., MD at [facility] OR    CATARACT EXTRACTION Right     EGD (ESOPHAGOGASTRODUODENOSCOPY) Left 4/27/2019    Performed by Carine Le MD at [facility] ENDO    FEMORAL BYPASS Right 2016    INSERTION, JEJUNOSTOMY TUBE N/A 5/1/2019     Performed by Jose G Dowell MD at [facility] OR 2ND FLR    LAPAROTOMY, EXPLORATORY N/A 5/2/2019    Performed by Everardo Smith MD at [facility] OR 2ND FLR    REVISION- Jejunostomy N/A 5/2/2019    Performed by Everardo Smith MD at [facility] OR 2ND FLR    ULTRASOUND, UPPER GI TRACT, ENDOSCOPIC N/A 5/3/2019    Performed by Pacheco Ferreira MD at [facility] ENDO (2ND FLR)       No family history on file.    Social History     Socioeconomic History    Marital status: Single     Spouse name: Not on file    Number of children: Not on file    Years of education: Not on file    Highest education level: Not on file   Occupational History    Not on file   Social Needs    Financial resource strain: Not on file    Food insecurity:     Worry: Not on file     Inability: Not on file    Transportation needs:     Medical: Not on file     Non-medical: Not on file   Tobacco Use    Smoking status: Former Smoker     Packs/day: 0.75     Years: 46.00     Pack years: 34.50     Start date: 1/1/1970     Last attempt to quit: 3/1/2016     Years since quitting: 3.1    Smokeless tobacco: Never Used   Substance and Sexual Activity    Alcohol use: No     Alcohol/week: 0.0 oz    Drug use: Not Currently    Sexual activity: Not on file   Lifestyle    Physical activity:     Days per week: Not on file     Minutes per session: Not on file    Stress: Not on file   Relationships    Social connections:     Talks on phone: Not on file     Gets together: Not on file     Attends Latter-day service: Not on file     Active member of club or organization: Not on file     Attends meetings of clubs or organizations: Not on file     Relationship status: Not on file   Other Topics Concern    Not on file   Social History Narrative    Not on file       Current Facility-Administered Medications on File Prior to Encounter   Medication Dose Route Frequency Provider Last Rate Last Dose    [DISCONTINUED] 0.9%  NaCl infusion (for blood administration)   Intravenous  Q24H PRN Lencho Mancera MD   200 mL at 05/01/19 1010    [DISCONTINUED] acetaminophen tablet 650 mg  650 mg Oral Q4H PRN Lencho Mancera MD   650 mg at 05/05/19 1658    [DISCONTINUED] DUREZOL (DIFLUPREDNATE 0.05%) EYE DROPS (patient's own med)  1 drop Right Eye BID Lencho Mancera MD   1 drop at 05/07/19 1010    [DISCONTINUED] hydrALAZINE tablet 25 mg  25 mg Oral Q8H PRN Anjum Leon PA-C   25 mg at 05/07/19 1652    [DISCONTINUED] ILEVO (NEPAFENAC 0.3%) EYE DROPS (patient's own med)  1 drop Right Eye Daily Lencho Mancera MD   1 drop at 05/07/19 1010    [DISCONTINUED] lactated ringers infusion  100 mL/hr Intravenous Continuous Lencho Mancera  mL/hr at 05/07/19 0054 100 mL/hr at 05/07/19 0054    [DISCONTINUED] ondansetron disintegrating tablet 8 mg  8 mg Oral Q8H PRN Lencho Mancera MD        [DISCONTINUED] ondansetron injection 4 mg  4 mg Intravenous Q6H PRN Lencho Mancera MD   4 mg at 05/06/19 0949    [DISCONTINUED] oxyCODONE-acetaminophen  mg per tablet 1 tablet  1 tablet Oral Q4H PRN Lencho Mancera MD   1 tablet at 05/02/19 2334    [DISCONTINUED] oxyCODONE-acetaminophen 5-325 mg per tablet 1 tablet  1 tablet Oral Q4H PRN Lencho Mancera MD   1 tablet at 05/06/19 1602    [DISCONTINUED] pantoprazole EC tablet 40 mg  40 mg Oral BID SAYRA Garner MD   40 mg at 05/07/19 1652    [DISCONTINUED] sodium chloride 0.9% flush 10 mL  10 mL Intravenous PRN Lencho Mancera MD   10 mL at 04/27/19 1953    [DISCONTINUED] sodium chloride 0.9% flush 10 mL  10 mL Intravenous PRN Lencho Mancera MD        [DISCONTINUED] sodium chloride 0.9% flush 3 mL  3 mL Intravenous PRN Lencho Mancera MD        [DISCONTINUED] tamsulosin 24 hr capsule 0.4 mg  0.4 mg Oral Daily Anjum Leon PA-C   0.4 mg at 05/07/19 1009     Current Outpatient Medications on File Prior to Encounter    Medication Sig Dispense Refill    allopurinol (ZYLOPRIM) 100 MG tablet Take 100 mg by mouth once daily.  3    amLODIPine (NORVASC) 10 MG tablet TAKE 1 TABLET(10 MG) BY MOUTH EVERY DAY 90 tablet 0    atorvastatin (LIPITOR) 40 MG tablet TAKE 1 TABLET BY MOUTH ONCE DAILY 30 tablet 0    benazepril (LOTENSIN) 20 MG tablet TAKE 1 TABLET(20 MG) BY MOUTH EVERY DAY 90 tablet 0    besifloxacin (BESIVANCE) 0.6 % DrpS Place 1 drop into both eyes 3 (three) times daily. for 7 days      carvedilol (COREG) 6.25 MG tablet TAKE 1 TABLET(6.25 MG) BY MOUTH TWICE DAILY 180 tablet 0    nepafenac (ILEVRO) 0.3 % DrpS Apply 1 drop to eye once daily.      oxyCODONE (ROXICODONE) 5 MG immediate release tablet Take 1 tablet (5 mg total) by mouth every 6 (six) hours as needed for Pain. 28 tablet 0    pantoprazole (PROTONIX) 40 MG tablet Take 1 tablet (40 mg total) by mouth 2 (two) times daily before meals. 60 tablet 11    tamsulosin (FLOMAX) 0.4 mg Cp24 Take 0.4 mg by mouth once daily.   2       Review of patient's allergies indicates:  No Known Allergies    ROS:   Constitutional: no fever or chills, pain controlled   Eyes: No eye pain or diplopia   Ears: No change in hearing or tinnitus  Nose: No epistaxis or frequent colds  Respiratory: No cough or dyspnea  Cardiovascular: no chest pain or palpitations   Gastrointestinal: no uncontrolled abdominal pain or vomiting   Genitourinary: no hematuria or dysuria   Hematologic/Lymphatic: no easy bruising or lymphadenopathy   Musculoskeletal: no arthralgias or myalgias   Neurological: no seizures or tremors   Skin: No rashes or itching    Phys:  Vitals:    05/08/19 0626 05/08/19 0640 05/08/19 0702   BP: (!) 160/72 (!) 161/83 (!) 148/71   Pulse: 104 96 90   Resp: 18 16    Temp: 98.7 °F (37.1 °C)     TempSrc: Oral     SpO2: 96% 97% 97%     Phys:   Gen: NAD   HEENT: NCAT, trachea midline  CV: RRR, no m/r/g   Pulm: Unlabored  Abd: Soft, nttp. No rebound, guarding. Incision with serous drainage  from around umbilicus.  No surrounding erythema.  No purulence.  No evidence of hernia  Extremities: no cyanosis or edema, or clubbing  Skin: Skin color, texture, turgor normal. No rashes or lesions    WBC 11     A/P Draining seroma    No signs of infection or bleeding  Asked him to keep the site dressed while it is draining to help prevent skin maceration  Keep existing follow up appointment with Dr. Smith next week (staple removal, etc)    Haroldo Michaels MD  General Surgery, PGY-5  [phone]

## 2019-05-08 NOTE — ED NOTES
LOC: The patient is awake, alert, and oriented to place, time, situation. Affect is appropriate.  Speech is appropriate and clear.     APPEARANCE: Patient resting comfortably in no acute distress.  Patient is clean and well groomed.    SKIN: The skin is warm and dry; color consistent with ethnicity.  Patient has normal skin turgor and moist mucus membranes.  Skin intact; no breakdown, rash or bruising noted. Patient has midline abdominal incision that has serosangeous drainage from it. Staples intact. No redness, warmth noted. Patient has x1wk placed tube LUQ. Mild redness noted around site.     MUSCULOSKELETAL: Patient moving upper and lower extremities without difficulty.  Denies weakness and fatigue. Denies pain.    RESPIRATORY: Airway is open and patent. Respirations spontaneous, even, easy, and non-labored.  Patient has a normal effort and rate.  No accessory muscle use noted. Denies cough. Patient denies sob.    CARDIAC:  Normal rhythm and rate noted.  No peripheral edema noted. No complaints of chest pain.     ABDOMEN: Soft and non tender to palpation.  No distention noted. Patient denies n/v. x1 episode of diarrhea yesterday.     NEUROLOGIC: Eyes open spontaneously.  Behavior appropriate to situation.  Follows commands; facial expression symmetrical.  Purposeful motor response noted; normal sensation in all extremities. Patient denies headache and dizziness.

## 2019-05-08 NOTE — ED NOTES
Report received from MONTY Coker. Pt is AAOx4. REU. Resident at bedside assessing pt. Pt denies pain. Side rails up x2, call bell in place. Awaiting orders at this time. Will continue to monitor.

## 2019-05-08 NOTE — ED NOTES
Serosanguinous drainage from midline abd surgery. Removed pt's towel with drainage. Placed new sterile drainage to site with paper tape.

## 2019-05-08 NOTE — DISCHARGE SUMMARY
"[facility] Hospital Medicine  Discharge Summary      Patient Name: Brooks Canas  MRN: [id number]  Admission Date: 4/26/2019  Hospital Length of Stay: 11 days  Discharge Date and Time: 5/7/2019  6:35 PM  Attending Physician: No att. providers found   Discharging Provider: Marybel Fernandez MD  Primary Care Provider: Aris Sahu MD  Hospital Medicine Team: [facility] HOSP MED R Marybel Fernandez MD    HPI:   The patient is a 68 y.o. male with hx of PAD, on Plavix who presents with complaint of hematemesis, onset 20 minutes PTA. Pt reports multiple episodes of emesis with bright red blood. He has associated abdominal "fullness," lightheadedness, and generalized weakness. He states that he felt lightheaded when getting off stretcher to walk to ED bed. Pt also states that in the past few days he has felt like his food has been getting stuck when swallow, sensation typically resolved without intervention. Pt denies any fever, chills, diarrhea, abdominal pain, chest pain, SOB, back pain, dizziness, extremity weakness, urinary sx, and rash. Pt reports no hx of drinking. He did not take his daily ASA today, but did take Plavix.        Procedure(s) (LRB):  ULTRASOUND, UPPER GI TRACT, ENDOSCOPIC (N/A)      Hospital Course:   He was treated initially with pantoprazole and sandostatin infusions, narrowed to pantoprazole at GI's recommendation. He underwent EGD on 4/27 which showed a partially circumferential fungating esophageal mass near the GE junction, described as somewhat necrotic appearing. Biopsies were taken, with oozing noted from the friable mass which resolved by the end of the procedure. He was transferred to [facility] for additional GI and surgical evaluation. GI was consulted and recommended AES consult for EUS. Gen Surg was also consulted for placement of J tube in anticipation for surgery. CTS consulted and planning on resection in roughly 8 weeks following neoadjuvant chemo (seen by  " Lon in Oncology) and XRT (arranged by Dr. Morgan in [facility]). Patient was tolerating diet at time of discharge and was discharged with HH orders and instructions for TF (based on % of meals consumed). He was also provided with instruction and contact numbers for his upcoming appointments.    For details on mgmt of IP problems, see below:     Acute upper GI bleed  Admitted to ICU @[facility]. 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  -Continued PPI  -H/H q12h.  Transfused for Hb < 8.0  -Held aspirin and plavix.  -Trial CLD, with advancement to soft diet. Tolerated well.      Esophageal mass  Per endoscopy (4/27) report:          A large, fungating mass with no bleeding and stigmata of recent        bleeding was found in the lower third of the esophagus and at the        gastroesophageal junction, 32 cm from the incisors. The mass was        partially obstructing and partially circumferential (involving        one-half of the lumen circumference). Biopsies were taken with a        cold forceps for histology.     CTS, AES, oncology, Rad onc consulted; appreciate assistance.   AES performed EUS , mass determined to be T3N1  MRI Brain unremarkable, Bone scan showed area of humerus which was nonspecific but warranted f/u (MRI as OP ordered), Radiation-Oncology consulted and XRT was begun as IP (to be continued as OP x28 sessions).   Open jejunostomy performed on 05/01/2019, revisited on 05/02 after dislodgement of the tube, started on tube feeds, which he tolerated well.      Acute blood loss anemia  -Due to GI bleeding  -Received 1 unit PRBC 4/27  -Stabilized without repeat bleeding.     Hypomagnesemia  -Repleted prn     Chronic kidney disease, stage III (moderate)  -back to baseline      Hypertension  -Held home oral meds in the setting of hypotension and GIB. Resumed upon discharge.         Consults:   Consults (From admission,  onward)        Status Ordering Provider     Inpatient consult to [facility] (AES)  Once     Provider:  (Not yet assigned)    Completed ROSALVA GUERRERO     Inpatient consult to Cardiothoracic Surgery  Once     Provider:  (Not yet assigned)    Completed KEI CORTEZ     Inpatient consult to Gastroenterology  Once     Provider:  Carine Le MD    Completed STEVE BROWN     Inpatient consult to General Surgery  Once     Provider:  (Not yet assigned)    Completed ROSALVA GUERRERO     Inpatient consult to Hematology/Oncology  Once     Provider:  (Not yet assigned)    Completed ROSALVA GUERRERO     Inpatient consult to Radiation Oncology  Once     Provider:  (Not yet assigned)    Completed CHIDI POND            Final Active Diagnoses:    Diagnosis Date Noted POA    PRINCIPAL PROBLEM:  Acute upper GI bleed [K92.2] 04/26/2019 Yes    Weakness [R53.1]  Yes    Esophageal mass [K22.9] 04/28/2019 Yes    Hypomagnesemia [E83.42] 04/27/2019 Yes    Acute blood loss anemia [D62] 04/27/2019 Yes    Chronic kidney disease, stage III (moderate) [N18.3] 03/01/2016 Yes    Hypertension [I10] 03/01/2016 Yes      Problems Resolved During this Admission:       Discharged Condition: stable    Disposition: Home or Self Care    Follow Up:  Follow-up Information     Jose G Dowell MD In 2 weeks.    Specialties:  General Surgery, Surgery  Why:  For wound re-check  Contact information:  [address]  [facility] [address]  [phone]             Aris Sahu MD. Call in 1 week.    Specialty:  Internal Medicine  Why:  hospital f/u, coordination of care, new dx of cancer- call to schedule an appointment at earliest convience.   Contact information:  [facility] [address]  [phone]             Everardo Vásquez MD In 8 weeks.    Specialty:  Cardiothoracic Surgery  Why:  esophageal cancer  Contact information:  [address]  [facility]  [facility] [address]  [phone]             Schedule an appointment as soon as possible for a visit with Vijay Forrest MD.    Specialties:  Hematology and Oncology, Hematology  Why:  esophageal cancer- should call with appointment time-please follow up   Contact information:  [address]  [facility] [address]  [phone]                 Patient Instructions:      MRI Humerus W WO Contrast Right   Standing Status: Future Standing Exp. Date: 05/07/20     Order Specific Question Answer Comments   Does the patient have a pacemaker or a defibrilator? No    Does the patient have a cerebral aneurysm or surgical clip, pump, nerve or brain stimulator, middle or inner ear prosthesis, or other metal implant or  been injured by a metal object(i.e. bullet, bb, shrapnel)? No    Is the patient claustrophobic? No    Will the patient require sedation? No    Does the patient have any of the following conditions? Diabetes, History of Renal Disease or Hypertension requiring medical therapy? No    May the Radiologist modify the order per protocol to meet the clinical needs of the patient? Yes    Is this part of a Research Study? No    Does the patient have on a skin patch for medication with aluminized backing? No        Significant Diagnostic Studies: Labs:   CMP   Recent Labs   Lab 05/07/19  0336      K 5.0      CO2 26   GLU 97   BUN 19   CREATININE 1.1   CALCIUM 8.4*   PROT 5.2*   ALBUMIN 2.5*   BILITOT 0.5   ALKPHOS 66   AST 34   ALT 19   ANIONGAP 7*   ESTGFRAFRICA >60.0   EGFRNONAA >60.0    and CBC   Recent Labs   Lab 05/07/19  0336 05/08/19  0740 05/08/19  0746   WBC 6.22 11.34  --    HGB 9.1* 9.1*  --    HCT 28.9* 28.4* 24*    205  --        Pending Diagnostic Studies:     Procedure Component Value Units Date/Time    US Endoscopic Ultrasound [[id number]] Resulted:  05/03/19 1235    Order Status:  Sent Lab Status:  In process Updated:  05/03/19 1235         Medications:  Reconciled Home  Medications:      Medication List      START taking these medications    oxyCODONE 5 MG immediate release tablet  Commonly known as:  ROXICODONE  Take 1 tablet (5 mg total) by mouth every 6 (six) hours as needed for Pain.     pantoprazole 40 MG tablet  Commonly known as:  PROTONIX  Take 1 tablet (40 mg total) by mouth 2 (two) times daily before meals.        CHANGE how you take these medications    besifloxacin 0.6 % Drps  Commonly known as:  BESIVANCE  Place 1 drop into both eyes 3 (three) times daily. for 7 days  What changed:  how to take this        CONTINUE taking these medications    allopurinol 100 MG tablet  Commonly known as:  ZYLOPRIM  Take 100 mg by mouth once daily.     amLODIPine 10 MG tablet  Commonly known as:  NORVASC  TAKE 1 TABLET(10 MG) BY MOUTH EVERY DAY     atorvastatin 40 MG tablet  Commonly known as:  LIPITOR  TAKE 1 TABLET BY MOUTH ONCE DAILY     benazepril 20 MG tablet  Commonly known as:  LOTENSIN  TAKE 1 TABLET(20 MG) BY MOUTH EVERY DAY     carvedilol 6.25 MG tablet  Commonly known as:  COREG  TAKE 1 TABLET(6.25 MG) BY MOUTH TWICE DAILY     ILEVRO 0.3 % Drps  Generic drug:  nepafenac  Apply 1 drop to eye once daily.     tamsulosin 0.4 mg Cap  Commonly known as:  FLOMAX  Take 0.4 mg by mouth once daily.        STOP taking these medications    clopidogrel 75 mg tablet  Commonly known as:  PLAVIX     DUREZOL 0.05 % Drop ophthalmic solution  Generic drug:  difluprednate            Indwelling Lines/Drains at time of discharge:   Lines/Drains/Airways     Drain                 Gastrostomy/Enterostomy 05/02/19 0821 Jejunostomy tube LUQ 6 days                Time spent on the discharge of patient: 45 minutes  Patient was seen and examined on the date of discharge and determined to be suitable for discharge.         Marybel Fernandez MD  Department of Hospital Medicine  [facility]

## 2019-05-09 PROCEDURE — G6002 PR STEREOSCOPIC XRAY GUIDE FOR RADIATION TX DELIV: ICD-10-PCS | Mod: 26,,, | Performed by: RADIOLOGY

## 2019-05-09 PROCEDURE — 77412 RADIATION TX DELIVERY LVL 3: CPT | Performed by: RADIOLOGY

## 2019-05-09 PROCEDURE — G6002 STEREOSCOPIC X-RAY GUIDANCE: HCPCS | Mod: 26,,, | Performed by: RADIOLOGY

## 2019-05-09 PROCEDURE — 77387 GUIDANCE FOR RADJ TX DLVR: CPT | Mod: TC | Performed by: RADIOLOGY

## 2019-05-10 ENCOUNTER — TELEPHONE (OUTPATIENT)
Dept: HEMATOLOGY/ONCOLOGY | Facility: CLINIC | Age: 69
End: 2019-05-10

## 2019-05-10 PROCEDURE — G6002 PR STEREOSCOPIC XRAY GUIDE FOR RADIATION TX DELIV: ICD-10-PCS | Mod: 26,,, | Performed by: RADIOLOGY

## 2019-05-10 PROCEDURE — G6002 STEREOSCOPIC X-RAY GUIDANCE: HCPCS | Mod: 26,,, | Performed by: RADIOLOGY

## 2019-05-10 PROCEDURE — 77412 RADIATION TX DELIVERY LVL 3: CPT | Performed by: RADIOLOGY

## 2019-05-10 PROCEDURE — 77387 GUIDANCE FOR RADJ TX DLVR: CPT | Mod: TC | Performed by: RADIOLOGY

## 2019-05-13 ENCOUNTER — DOCUMENTATION ONLY (OUTPATIENT)
Dept: RADIATION ONCOLOGY | Facility: CLINIC | Age: 69
End: 2019-05-13

## 2019-05-13 PROCEDURE — 77412 RADIATION TX DELIVERY LVL 3: CPT | Performed by: RADIOLOGY

## 2019-05-13 PROCEDURE — 77336 RADIATION PHYSICS CONSULT: CPT | Performed by: RADIOLOGY

## 2019-05-13 PROCEDURE — 77387 GUIDANCE FOR RADJ TX DLVR: CPT | Mod: TC | Performed by: RADIOLOGY

## 2019-05-13 PROCEDURE — G6002 PR STEREOSCOPIC XRAY GUIDE FOR RADIATION TX DELIV: ICD-10-PCS | Mod: 26,,, | Performed by: RADIOLOGY

## 2019-05-13 PROCEDURE — G6002 STEREOSCOPIC X-RAY GUIDANCE: HCPCS | Mod: 26,,, | Performed by: RADIOLOGY

## 2019-05-13 PROCEDURE — 77417 THER RADIOLOGY PORT IMAGE(S): CPT | Performed by: RADIOLOGY

## 2019-05-14 PROCEDURE — G6002 STEREOSCOPIC X-RAY GUIDANCE: HCPCS | Mod: 26,,, | Performed by: RADIOLOGY

## 2019-05-14 PROCEDURE — 77387 GUIDANCE FOR RADJ TX DLVR: CPT | Mod: TC | Performed by: RADIOLOGY

## 2019-05-14 PROCEDURE — G6002 PR STEREOSCOPIC XRAY GUIDE FOR RADIATION TX DELIV: ICD-10-PCS | Mod: 26,,, | Performed by: RADIOLOGY

## 2019-05-14 PROCEDURE — 77412 RADIATION TX DELIVERY LVL 3: CPT | Performed by: RADIOLOGY

## 2019-05-15 PROCEDURE — G6002 STEREOSCOPIC X-RAY GUIDANCE: HCPCS | Mod: 26,,, | Performed by: RADIOLOGY

## 2019-05-15 PROCEDURE — 77412 RADIATION TX DELIVERY LVL 3: CPT | Performed by: RADIOLOGY

## 2019-05-15 PROCEDURE — G6002 PR STEREOSCOPIC XRAY GUIDE FOR RADIATION TX DELIV: ICD-10-PCS | Mod: 26,,, | Performed by: RADIOLOGY

## 2019-05-15 PROCEDURE — 77387 GUIDANCE FOR RADJ TX DLVR: CPT | Mod: TC | Performed by: RADIOLOGY

## 2019-05-16 ENCOUNTER — TELEPHONE (OUTPATIENT)
Dept: HEMATOLOGY/ONCOLOGY | Facility: CLINIC | Age: 69
End: 2019-05-16

## 2019-05-16 PROCEDURE — G6002 STEREOSCOPIC X-RAY GUIDANCE: HCPCS | Mod: 26,,, | Performed by: RADIOLOGY

## 2019-05-16 PROCEDURE — 77387 GUIDANCE FOR RADJ TX DLVR: CPT | Mod: TC | Performed by: RADIOLOGY

## 2019-05-16 PROCEDURE — G6002 PR STEREOSCOPIC XRAY GUIDE FOR RADIATION TX DELIV: ICD-10-PCS | Mod: 26,,, | Performed by: RADIOLOGY

## 2019-05-16 PROCEDURE — 77412 RADIATION TX DELIVERY LVL 3: CPT | Performed by: RADIOLOGY

## 2019-05-16 NOTE — TELEPHONE ENCOUNTER
tried reaching out to pt on today to discuss upcoming appointments, but no answer and not able to leave a message due to mailbox is full.

## 2019-05-16 NOTE — TELEPHONE ENCOUNTER
Spoke to patient. Scheduled next Tuesday at 12. Will attempt to set chemo up as well. Still waiting on authorization.

## 2019-05-17 PROCEDURE — 77412 RADIATION TX DELIVERY LVL 3: CPT | Performed by: RADIOLOGY

## 2019-05-17 PROCEDURE — G6002 STEREOSCOPIC X-RAY GUIDANCE: HCPCS | Mod: 26,,, | Performed by: RADIOLOGY

## 2019-05-17 PROCEDURE — G6002 PR STEREOSCOPIC XRAY GUIDE FOR RADIATION TX DELIV: ICD-10-PCS | Mod: 26,,, | Performed by: RADIOLOGY

## 2019-05-17 PROCEDURE — 77387 GUIDANCE FOR RADJ TX DLVR: CPT | Mod: TC | Performed by: RADIOLOGY

## 2019-05-20 ENCOUNTER — DOCUMENTATION ONLY (OUTPATIENT)
Dept: RADIATION ONCOLOGY | Facility: CLINIC | Age: 69
End: 2019-05-20

## 2019-05-20 PROCEDURE — G6002 PR STEREOSCOPIC XRAY GUIDE FOR RADIATION TX DELIV: ICD-10-PCS | Mod: 26,,, | Performed by: RADIOLOGY

## 2019-05-20 PROCEDURE — 77412 RADIATION TX DELIVERY LVL 3: CPT | Performed by: RADIOLOGY

## 2019-05-20 PROCEDURE — 77387 GUIDANCE FOR RADJ TX DLVR: CPT | Mod: TC | Performed by: RADIOLOGY

## 2019-05-20 PROCEDURE — G6002 STEREOSCOPIC X-RAY GUIDANCE: HCPCS | Mod: 26,,, | Performed by: RADIOLOGY

## 2019-05-20 NOTE — PLAN OF CARE
Problem: Adult Inpatient Plan of Care  Goal: Plan of Care Review  Outcome: Ongoing (interventions implemented as appropriate)  Day 11 of outpatient radiation to the esophagus wgt down 7 lbs.  Using j tube 8 hours a day continues to eat  Chemo starting tomorrow

## 2019-05-21 ENCOUNTER — OFFICE VISIT (OUTPATIENT)
Dept: HEMATOLOGY/ONCOLOGY | Facility: CLINIC | Age: 69
End: 2019-05-21
Payer: MEDICARE

## 2019-05-21 ENCOUNTER — INFUSION (OUTPATIENT)
Dept: INFUSION THERAPY | Facility: HOSPITAL | Age: 69
End: 2019-05-21
Attending: INTERNAL MEDICINE
Payer: MEDICARE

## 2019-05-21 VITALS
DIASTOLIC BLOOD PRESSURE: 52 MMHG | HEART RATE: 79 BPM | RESPIRATION RATE: 20 BRPM | TEMPERATURE: 98 F | SYSTOLIC BLOOD PRESSURE: 96 MMHG

## 2019-05-21 VITALS
SYSTOLIC BLOOD PRESSURE: 92 MMHG | BODY MASS INDEX: 23.58 KG/M2 | WEIGHT: 168.44 LBS | TEMPERATURE: 98 F | HEIGHT: 71 IN | RESPIRATION RATE: 18 BRPM | DIASTOLIC BLOOD PRESSURE: 57 MMHG | HEART RATE: 84 BPM

## 2019-05-21 DIAGNOSIS — C15.9 ESOPHAGEAL CANCER, STAGE IIA: Primary | ICD-10-CM

## 2019-05-21 PROCEDURE — 3078F DIAST BP <80 MM HG: CPT | Mod: CPTII,S$GLB,, | Performed by: INTERNAL MEDICINE

## 2019-05-21 PROCEDURE — 1101F PT FALLS ASSESS-DOCD LE1/YR: CPT | Mod: CPTII,S$GLB,, | Performed by: INTERNAL MEDICINE

## 2019-05-21 PROCEDURE — 77336 RADIATION PHYSICS CONSULT: CPT | Performed by: RADIOLOGY

## 2019-05-21 PROCEDURE — 25000003 PHARM REV CODE 250: Performed by: INTERNAL MEDICINE

## 2019-05-21 PROCEDURE — 3078F PR MOST RECENT DIASTOLIC BLOOD PRESSURE < 80 MM HG: ICD-10-PCS | Mod: CPTII,S$GLB,, | Performed by: INTERNAL MEDICINE

## 2019-05-21 PROCEDURE — 99999 PR PBB SHADOW E&M-EST. PATIENT-LVL IV: CPT | Mod: PBBFAC,,, | Performed by: INTERNAL MEDICINE

## 2019-05-21 PROCEDURE — 99999 PR PBB SHADOW E&M-EST. PATIENT-LVL IV: ICD-10-PCS | Mod: PBBFAC,,, | Performed by: INTERNAL MEDICINE

## 2019-05-21 PROCEDURE — 77417 THER RADIOLOGY PORT IMAGE(S): CPT | Performed by: RADIOLOGY

## 2019-05-21 PROCEDURE — 3074F PR MOST RECENT SYSTOLIC BLOOD PRESSURE < 130 MM HG: ICD-10-PCS | Mod: CPTII,S$GLB,, | Performed by: INTERNAL MEDICINE

## 2019-05-21 PROCEDURE — 77387 GUIDANCE FOR RADJ TX DLVR: CPT | Mod: TC | Performed by: RADIOLOGY

## 2019-05-21 PROCEDURE — G6002 PR STEREOSCOPIC XRAY GUIDE FOR RADIATION TX DELIV: ICD-10-PCS | Mod: 26,,, | Performed by: RADIOLOGY

## 2019-05-21 PROCEDURE — 1101F PR PT FALLS ASSESS DOC 0-1 FALLS W/OUT INJ PAST YR: ICD-10-PCS | Mod: CPTII,S$GLB,, | Performed by: INTERNAL MEDICINE

## 2019-05-21 PROCEDURE — 3074F SYST BP LT 130 MM HG: CPT | Mod: CPTII,S$GLB,, | Performed by: INTERNAL MEDICINE

## 2019-05-21 PROCEDURE — 99214 OFFICE O/P EST MOD 30 MIN: CPT | Mod: S$GLB,,, | Performed by: INTERNAL MEDICINE

## 2019-05-21 PROCEDURE — 99214 PR OFFICE/OUTPT VISIT, EST, LEVL IV, 30-39 MIN: ICD-10-PCS | Mod: S$GLB,,, | Performed by: INTERNAL MEDICINE

## 2019-05-21 PROCEDURE — 77412 RADIATION TX DELIVERY LVL 3: CPT | Performed by: RADIOLOGY

## 2019-05-21 PROCEDURE — 63600175 PHARM REV CODE 636 W HCPCS: Performed by: INTERNAL MEDICINE

## 2019-05-21 PROCEDURE — G6002 STEREOSCOPIC X-RAY GUIDANCE: HCPCS | Mod: 26,,, | Performed by: RADIOLOGY

## 2019-05-21 PROCEDURE — S0028 INJECTION, FAMOTIDINE, 20 MG: HCPCS | Performed by: INTERNAL MEDICINE

## 2019-05-21 RX ORDER — SODIUM CHLORIDE 0.9 % (FLUSH) 0.9 %
10 SYRINGE (ML) INJECTION
Status: CANCELLED | OUTPATIENT
Start: 2019-05-21

## 2019-05-21 RX ORDER — HEPARIN 100 UNIT/ML
500 SYRINGE INTRAVENOUS
Status: CANCELLED | OUTPATIENT
Start: 2019-05-21

## 2019-05-21 RX ORDER — FAMOTIDINE 10 MG/ML
20 INJECTION INTRAVENOUS
Status: COMPLETED | OUTPATIENT
Start: 2019-05-21 | End: 2019-05-21

## 2019-05-21 RX ORDER — DIPHENHYDRAMINE HYDROCHLORIDE 50 MG/ML
50 INJECTION INTRAMUSCULAR; INTRAVENOUS ONCE AS NEEDED
Status: CANCELLED | OUTPATIENT
Start: 2019-05-21

## 2019-05-21 RX ORDER — DIPHENHYDRAMINE HYDROCHLORIDE 50 MG/ML
50 INJECTION INTRAMUSCULAR; INTRAVENOUS ONCE AS NEEDED
Status: DISCONTINUED | OUTPATIENT
Start: 2019-05-21 | End: 2019-05-21 | Stop reason: HOSPADM

## 2019-05-21 RX ORDER — EPINEPHRINE 0.3 MG/.3ML
0.3 INJECTION SUBCUTANEOUS ONCE AS NEEDED
Status: CANCELLED | OUTPATIENT
Start: 2019-05-21

## 2019-05-21 RX ORDER — ONDANSETRON 8 MG/1
8 TABLET, ORALLY DISINTEGRATING ORAL EVERY 8 HOURS PRN
Qty: 30 TABLET | Refills: 1 | Status: SHIPPED | OUTPATIENT
Start: 2019-05-21 | End: 2020-05-20

## 2019-05-21 RX ORDER — SODIUM CHLORIDE 9 MG/ML
INJECTION, SOLUTION INTRAVENOUS CONTINUOUS
Status: DISCONTINUED | OUTPATIENT
Start: 2019-05-21 | End: 2019-05-21 | Stop reason: HOSPADM

## 2019-05-21 RX ORDER — HEPARIN 100 UNIT/ML
500 SYRINGE INTRAVENOUS
Status: DISCONTINUED | OUTPATIENT
Start: 2019-05-21 | End: 2019-05-21 | Stop reason: HOSPADM

## 2019-05-21 RX ORDER — EPINEPHRINE 0.3 MG/.3ML
0.3 INJECTION SUBCUTANEOUS ONCE AS NEEDED
Status: DISCONTINUED | OUTPATIENT
Start: 2019-05-21 | End: 2019-05-21 | Stop reason: HOSPADM

## 2019-05-21 RX ORDER — SODIUM CHLORIDE 0.9 % (FLUSH) 0.9 %
10 SYRINGE (ML) INJECTION
Status: DISCONTINUED | OUTPATIENT
Start: 2019-05-21 | End: 2019-05-21 | Stop reason: HOSPADM

## 2019-05-21 RX ORDER — SODIUM CHLORIDE 9 MG/ML
INJECTION, SOLUTION INTRAVENOUS CONTINUOUS
Status: CANCELLED
Start: 2019-05-21

## 2019-05-21 RX ORDER — FAMOTIDINE 10 MG/ML
20 INJECTION INTRAVENOUS
Status: CANCELLED | OUTPATIENT
Start: 2019-05-21

## 2019-05-21 RX ADMIN — DEXAMETHASONE SODIUM PHOSPHATE: 4 INJECTION, SOLUTION INTRA-ARTICULAR; INTRALESIONAL; INTRAMUSCULAR; INTRAVENOUS; SOFT TISSUE at 03:05

## 2019-05-21 RX ADMIN — FAMOTIDINE 20 MG: 10 INJECTION, SOLUTION INTRAVENOUS at 02:05

## 2019-05-21 RX ADMIN — SODIUM CHLORIDE: 0.9 INJECTION, SOLUTION INTRAVENOUS at 01:05

## 2019-05-21 RX ADMIN — DIPHENHYDRAMINE HYDROCHLORIDE 50 MG: 50 INJECTION, SOLUTION INTRAMUSCULAR; INTRAVENOUS at 02:05

## 2019-05-21 RX ADMIN — CARBOPLATIN 155 MG: 10 INJECTION, SOLUTION INTRAVENOUS at 04:05

## 2019-05-21 RX ADMIN — SODIUM CHLORIDE: 9 INJECTION, SOLUTION INTRAVENOUS at 04:05

## 2019-05-21 RX ADMIN — PACLITAXEL 102 MG: 6 INJECTION, SOLUTION INTRAVENOUS at 03:05

## 2019-05-21 NOTE — PROGRESS NOTES
Subjective:       Patient ID: Brooks Canas is a 69 y.o. male.    Chief Complaint: Esophageal Cancer (chemo start, hospital follow up)    HPI -     This is a 68-year-old male with a past medical history of PAD status post right femoral popliteal bypass, CKD stage 3, significant tobacco use presented to ED for dysphagia and hematemesis.     Patient reports dysphagia mainly with solids for the last 2 weeks however he was able to tolerate liquids.  He denies any odynophagia but reports to hematemesis and nausea.  He also complains of abdominal fullness but denied of any abdominal pain, diarrhea, chest pain, headaches, weight loss, shortness of breath, palpitations or leg pain. EGD performed on April 27, 2019 revealed a large fungating mass in the lower 3rd of the esophagus at the GE junction and the mass is partially obstructing and partially circumferential which was biopsied.  Advanced endoscopic services is consulted for further evaluation.  General surgery consulted for tube feed placement.  Thoracic surgery on board.  CT chest abdomen and pelvis did not reveal metastatic spread.     Review of Systems   Constitutional: Negative for appetite change and unexpected weight change.   Eyes: Negative for visual disturbance.   Respiratory: Negative for cough and shortness of breath.    Cardiovascular: Negative for chest pain.   Gastrointestinal: Negative for abdominal pain and diarrhea.   Genitourinary: Negative for frequency.   Musculoskeletal: Negative for back pain.   Skin: Negative for rash.   Neurological: Negative for headaches.   Hematological: Negative for adenopathy.   Psychiatric/Behavioral: The patient is not nervous/anxious.        Allergies:  Review of patient's allergies indicates:  No Known Allergies    Medications:  Current Outpatient Medications   Medication Sig Dispense Refill    allopurinol (ZYLOPRIM) 100 MG tablet Take 100 mg by mouth once daily.  3    amLODIPine (NORVASC) 10 MG tablet  TAKE 1 TABLET(10 MG) BY MOUTH EVERY DAY 90 tablet 0    atorvastatin (LIPITOR) 40 MG tablet TAKE 1 TABLET BY MOUTH ONCE DAILY 30 tablet 0    benazepril (LOTENSIN) 20 MG tablet TAKE 1 TABLET(20 MG) BY MOUTH EVERY DAY 90 tablet 0    carvedilol (COREG) 6.25 MG tablet TAKE 1 TABLET(6.25 MG) BY MOUTH TWICE DAILY 180 tablet 0    pantoprazole (PROTONIX) 40 MG tablet Take 1 tablet (40 mg total) by mouth 2 (two) times daily before meals. 60 tablet 11    tamsulosin (FLOMAX) 0.4 mg Cp24 Take 0.4 mg by mouth once daily.   2    nepafenac (ILEVRO) 0.3 % DrpS Apply 1 drop to eye once daily.      ondansetron (ZOFRAN-ODT) 8 MG TbDL Take 1 tablet (8 mg total) by mouth every 8 (eight) hours as needed (nausea or vomiting - CANCER PATIENT ON CHEMO). 30 tablet 1    oxyCODONE (ROXICODONE) 5 MG immediate release tablet Take 1 tablet (5 mg total) by mouth every 6 (six) hours as needed for Pain. 28 tablet 0     No current facility-administered medications for this visit.        PMH:  Past Medical History:   Diagnosis Date    BPH (benign prostatic hyperplasia)     Chronic kidney disease, stage III (moderate) 3/1/2016    12/10/2015: BUN/crea: 32/1.63. CrCl 43.    Claudication in peripheral vascular disease     History of tobacco use 11/4/2016    1970: Began smoking.   3/1/2016: Quit smoking.    Hypertension, benign 3/1/2016    2012: Diagnosed.    Hyperuricemia     Leg swelling 3/14/2016    3/4/2016: Began having right leg edema.    PAD (peripheral artery disease)     Peripheral artery disease 3/1/2016    2014: Began experience bilateral calf claudication.  1/2016: [facility]: Arterial Duplex: Right: SFA: distal severe. RAVI 0.78. Left: SFA: mid occlusion. RAVI 0.65. 2/1/2016: Began experience pain in right great toe.    Tobacco use 3/1/2016    1970: Began smoking. Unable to quit.    Vitamin D deficiency disease        PSH:  Past Surgical History:   Procedure Laterality Date    ANGIOGRAM-EXTREMITY-LOWER Bilateral 3/2/2016     Performed by [facility]yers [facility] at [facility] CATH LAB    AORTOGRAM WITH RUNOFF Bilateral 5/4/2017    Performed by [facility]yers [facility] at [facility] CATH LAB    APPENDECTOMY      OPCDVS-NVJHOHW-MXHIWRROA Right 3/4/2016    Performed by Aris Finney Jr., MD at [facility] OR    CATARACT EXTRACTION Right     EGD (ESOPHAGOGASTRODUODENOSCOPY) Left 4/27/2019    Performed by Carine Le MD at [facility] ENDO    FEMORAL BYPASS Right 2016    INSERTION, JEJUNOSTOMY TUBE N/A 5/1/2019    Performed by Jose G Dowell MD at [facility] OR 2ND FLR    LAPAROTOMY, EXPLORATORY N/A 5/2/2019    Performed by Everardo Smith MD at [facility] OR 2ND [facility]LR    REVISION- Jejunostomy N/A 5/2/2019    Performed by Everardo Smith [facility] at [facility] OR 2ND [facility]LR    ULTRASOUND, UPPER GI TRACT, ENDOSCOPIC N/A 5/3/2019    Performed by Pacheco Ferreira MD at [facility] ENDO (2ND FLR)       FamHx:  No family history on file.    SocHx:  Social History     Socioeconomic History    Marital status: Single     Spouse name: Not on file    Number of children: Not on file    Years of education: Not on file    Highest education level: Not on file   Occupational History    Not on file   Social Needs    Financial resource strain: Not on file    Food insecurity:     Worry: Not on file     Inability: Not on file    Transportation needs:     Medical: Not on file     Non-medical: Not on file   Tobacco Use    Smoking status: Former Smoker     Packs/day: 0.75     Years: 46.00     Pack years: 34.50     Start date: 1/1/1970     Last attempt to quit: 3/1/2016     Years since quitting: 3.2    Smokeless tobacco: Never Used   Substance and Sexual Activity    Alcohol use: No     Alcohol/week: 0.0 oz    Drug use: Not Currently    Sexual activity: Not on file   Lifestyle    Physical activity:     Days per week: Not on file     Minutes per session: Not on file    Stress: Not on file   Relationships    Social connections:     Talks on phone: Not on file     Gets together: Not on file     Attends  Sabianism service: Not on file     Active member of club or organization: Not on file     Attends meetings of clubs or organizations: Not on file     Relationship status: Not on file   Other Topics Concern    Not on file   Social History Narrative    Not on file       Distress Score    Distress Score: 3        Practical Problems Physical Problems   : No Appearance: No   Housing: No Bathing / Dressing: No   Insurance / Financial: No Breathing: No    Transportation: No  Changes in Urination: No    Work / School: No  Constipation: No   Treatment Decisions: No  Diarrhea: No     Eating: No    Family Problems Fatigue: No    Dealing with Children: No Feeling Swollen: No    Dealing with Partner: No Fevers: No    Ability to Have Children: No  Getting Around: No    Family Health Issues: No  Indigestion: No     Memory / Concentration: No   Emotional Problems Mouth Sores: No    Depression: No  Nausea: No    Fears: No  Nose Dry / Congested: No    Nervousness: Yes  Pain: No    Sadness: No Sexual: No    Worry: Yes Skin Dry / Itchy: No    Loss of Interest in Usual Activities: No Sleep: No     Substance Abuse: No    Spiritual/Religions Concerns Tingling in Hands / Feet: No   Spritual / Sabianist Concerns: No         Other Problems                Objective:      Physical Exam   Constitutional: He is oriented to person, place, and time. He appears well-developed and well-nourished. No distress.   HENT:   Head: Normocephalic and atraumatic.   Mouth/Throat: No oropharyngeal exudate.   Eyes: Pupils are equal, round, and reactive to light. EOM are normal. Right eye exhibits no discharge. Left eye exhibits no discharge. No scleral icterus.   Neck: Normal range of motion.   Musculoskeletal: Normal range of motion. He exhibits no edema or deformity.   Neurological: He is alert and oriented to person, place, and time. No cranial nerve deficit.   Skin: Skin is warm and dry. No rash noted. He is not diaphoretic. No erythema. No  pallor.   Psychiatric: He has a normal mood and affect. His behavior is normal. Judgment and thought content normal.         Assessment:       1. Esophageal cancer, stage IIA          Plan:         1. Esophageal Cancer:    Stage IIA, undergoing XRT, here to start weekly chemo.      Patient was consented for chemotherapy today 5/24/2019 .   An extensive discussion was had which included a thorough discussion of the risk and benefits of treatment and alternatives.  Risks, including but not limited to, possible hair loss, bone marrow damage (anemia, thrombocytopenia, immune suppression, neutropenia), damage to body organs (brain, heart, liver, kidney, lungs, nervous system, skin, and others), allergic reactions, sterility, nausea/vomiting, constipation/diarrhea, sores in the mouth, secondary cancers, local damage at possible injection sites, and rarely death were all discussed.  The patient agrees with the plan, and all questions have been answered to their satisfaction.  Consent was signed the patient, provider, and a third party witness.      RTC 1 week for chemo, to see me or Caryn, and CBC, CMP.    Chemo/XRT will be followed by an attempt at resection by Dr. Vásquez.    The patient agrees with the plan, and all questions have been answered to their satisfaction.      More than 25 mins were spent during this encounter, greater than 50% was spent in direct counseling and/or coordination of care.     Yuan Lewis M.D., M.S., F.A.C.P.  Hematology and Oncology Attending  [facility]

## 2019-05-21 NOTE — PLAN OF CARE
Problem: Anemia (Chemotherapy Effects)  Goal: Anemia Symptom Improvement    Intervention: Monitor and Manage Anemia  Educated on chemotherapy medications and consented for chemo per physician. Viewed chemotherapy class video on tablet given tour, chemo binder and opportunity to ask questions in infusion suite. Pt tolerated day 1 taxol carbo well no adverse reaction. IVF 1L bolus administered to PIV vitals stable throughout tx. PIV removed, leaves clinic ambulatory accompanied by friend avs printed reviewed. Instructed to contact MD office with questions or concerns. Reviewed conditions in which to contact after hours MD for or to report to ER understanding acknowledged by teach back method.

## 2019-05-22 ENCOUNTER — PATIENT MESSAGE (OUTPATIENT)
Dept: HEMATOLOGY/ONCOLOGY | Facility: CLINIC | Age: 69
End: 2019-05-22

## 2019-05-22 DIAGNOSIS — E87.5 HYPERKALEMIA: Primary | ICD-10-CM

## 2019-05-22 PROCEDURE — G6002 STEREOSCOPIC X-RAY GUIDANCE: HCPCS | Mod: 26,,, | Performed by: RADIOLOGY

## 2019-05-22 PROCEDURE — G6002 PR STEREOSCOPIC XRAY GUIDE FOR RADIATION TX DELIV: ICD-10-PCS | Mod: 26,,, | Performed by: RADIOLOGY

## 2019-05-22 PROCEDURE — 77412 RADIATION TX DELIVERY LVL 3: CPT | Performed by: RADIOLOGY

## 2019-05-22 PROCEDURE — 77387 GUIDANCE FOR RADJ TX DLVR: CPT | Mod: TC | Performed by: RADIOLOGY

## 2019-05-23 ENCOUNTER — OFFICE VISIT (OUTPATIENT)
Dept: SURGERY | Facility: CLINIC | Age: 69
End: 2019-05-23
Payer: MEDICARE

## 2019-05-23 VITALS
DIASTOLIC BLOOD PRESSURE: 61 MMHG | SYSTOLIC BLOOD PRESSURE: 119 MMHG | TEMPERATURE: 99 F | BODY MASS INDEX: 24.34 KG/M2 | WEIGHT: 170 LBS | HEIGHT: 70 IN | HEART RATE: 86 BPM

## 2019-05-23 DIAGNOSIS — C15.5 MALIGNANT NEOPLASM OF LOWER THIRD OF ESOPHAGUS: Primary | ICD-10-CM

## 2019-05-23 PROCEDURE — 77412 RADIATION TX DELIVERY LVL 3: CPT | Performed by: RADIOLOGY

## 2019-05-23 PROCEDURE — 99024 POSTOP FOLLOW-UP VISIT: CPT | Mod: S$GLB,,, | Performed by: SURGERY

## 2019-05-23 PROCEDURE — 99999 PR PBB SHADOW E&M-EST. PATIENT-LVL III: ICD-10-PCS | Mod: PBBFAC,,, | Performed by: SURGERY

## 2019-05-23 PROCEDURE — 99024 PR POST-OP FOLLOW-UP VISIT: ICD-10-PCS | Mod: S$GLB,,, | Performed by: SURGERY

## 2019-05-23 PROCEDURE — 99999 PR PBB SHADOW E&M-EST. PATIENT-LVL III: CPT | Mod: PBBFAC,,, | Performed by: SURGERY

## 2019-05-23 PROCEDURE — G6002 PR STEREOSCOPIC XRAY GUIDE FOR RADIATION TX DELIV: ICD-10-PCS | Mod: 26,,, | Performed by: RADIOLOGY

## 2019-05-23 PROCEDURE — G6002 STEREOSCOPIC X-RAY GUIDANCE: HCPCS | Mod: 26,,, | Performed by: RADIOLOGY

## 2019-05-23 PROCEDURE — 77387 GUIDANCE FOR RADJ TX DLVR: CPT | Mod: TC | Performed by: RADIOLOGY

## 2019-05-23 NOTE — PROGRESS NOTES
"Patient is a 69 y.o. man s/p open J tube placement for esophageal cancer 5/1, and open re-placement on 5/2 (after inadvertent pulling on POD 0, who presents to clinic today for post op check    S:  Pain is resolving.  There is some redness around his J tube and drainage.  He was seen in the ED for a seroma, but this has resolved by now.  He is using the tube for feedings intermittently, as he is still tolerating a PO diet.    O:   Vitals:    05/23/19 0853   BP: 119/61   Pulse: 86   Temp: 98.7 °F (37.1 °C)   TempSrc: Oral   Weight: 77.1 kg (169 lb 15.6 oz)   Height: 5' 10" (1.778 m)       Phys:   Gen: NAD   HEENT: NCAT, trachea midline  Pulm: Unlabored  Abd: Soft, nttp. No rebound, guarding. Staples removed.  Incision c/d/i.  J tube with some surrounding erythema, but no fluctuance  Extremities: no cyanosis or edema, or clubbing  Skin: Skin color, texture, turgor normal. No rashes or lesions    A/P S/p J tube placement    Staples removed.  Cleaned around J tube and placed a fresh dressing  Counseled the patient on washing around the tube every day with warm soap and water, and keeping it dressed so any surrounding drainage (which is expected) does not irritate his skin  F/u PRN    Haroldo Michaels MD  General Surgery, PGY-5  [phone]      I have personally performed a detailed history and physical examination on this patient. My findings are summarized in the resident's note included in the record.  "

## 2019-05-24 ENCOUNTER — TELEPHONE (OUTPATIENT)
Dept: HEMATOLOGY/ONCOLOGY | Facility: CLINIC | Age: 69
End: 2019-05-24

## 2019-05-24 DIAGNOSIS — C15.9 ESOPHAGEAL CANCER, STAGE IIA: Primary | ICD-10-CM

## 2019-05-24 PROCEDURE — G6002 PR STEREOSCOPIC XRAY GUIDE FOR RADIATION TX DELIV: ICD-10-PCS | Mod: 26,,, | Performed by: RADIOLOGY

## 2019-05-24 PROCEDURE — 77387 GUIDANCE FOR RADJ TX DLVR: CPT | Mod: TC | Performed by: RADIOLOGY

## 2019-05-24 PROCEDURE — G6002 STEREOSCOPIC X-RAY GUIDANCE: HCPCS | Mod: 26,,, | Performed by: RADIOLOGY

## 2019-05-24 PROCEDURE — 77412 RADIATION TX DELIVERY LVL 3: CPT | Performed by: RADIOLOGY

## 2019-05-24 NOTE — TELEPHONE ENCOUNTER
spoke with pt on today to discuss upcoming appointments, but no answer, a detail message eft on v/m.

## 2019-05-24 NOTE — TELEPHONE ENCOUNTER
----- Message from Yuan Lewis MD sent at 5/24/2019 11:24 AM CDT -----  RTC 1 week for chemo, to see me or Caryn, and CBC, CMP.

## 2019-05-27 ENCOUNTER — LAB VISIT (OUTPATIENT)
Dept: LAB | Facility: HOSPITAL | Age: 69
End: 2019-05-27
Payer: MEDICARE

## 2019-05-27 DIAGNOSIS — C15.9 ESOPHAGEAL CANCER, STAGE IIA: ICD-10-CM

## 2019-05-27 LAB
ALBUMIN SERPL BCP-MCNC: 3.2 G/DL (ref 3.5–5.2)
ALP SERPL-CCNC: 111 U/L (ref 55–135)
ALT SERPL W/O P-5'-P-CCNC: 17 U/L (ref 10–44)
ANION GAP SERPL CALC-SCNC: 9 MMOL/L (ref 8–16)
AST SERPL-CCNC: 16 U/L (ref 10–40)
BILIRUB SERPL-MCNC: 0.6 MG/DL (ref 0.1–1)
BUN SERPL-MCNC: 31 MG/DL (ref 8–23)
CALCIUM SERPL-MCNC: 9.3 MG/DL (ref 8.7–10.5)
CHLORIDE SERPL-SCNC: 105 MMOL/L (ref 95–110)
CO2 SERPL-SCNC: 22 MMOL/L (ref 23–29)
CREAT SERPL-MCNC: 1.5 MG/DL (ref 0.5–1.4)
ERYTHROCYTE [DISTWIDTH] IN BLOOD BY AUTOMATED COUNT: 15.8 % (ref 11.5–14.5)
EST. GFR  (AFRICAN AMERICAN): 53.4 ML/MIN/1.73 M^2
EST. GFR  (NON AFRICAN AMERICAN): 46.2 ML/MIN/1.73 M^2
GLUCOSE SERPL-MCNC: 138 MG/DL (ref 70–110)
HCT VFR BLD AUTO: 32.6 % (ref 40–54)
HGB BLD-MCNC: 10.3 G/DL (ref 14–18)
IMM GRANULOCYTES # BLD AUTO: 0.04 K/UL (ref 0–0.04)
MCH RBC QN AUTO: 27.5 PG (ref 27–31)
MCHC RBC AUTO-ENTMCNC: 31.6 G/DL (ref 32–36)
MCV RBC AUTO: 87 FL (ref 82–98)
NEUTROPHILS # BLD AUTO: 3.3 K/UL (ref 1.8–7.7)
PLATELET # BLD AUTO: 145 K/UL (ref 150–350)
PMV BLD AUTO: 11.6 FL (ref 9.2–12.9)
POTASSIUM SERPL-SCNC: 5.7 MMOL/L (ref 3.5–5.1)
PROT SERPL-MCNC: 6.5 G/DL (ref 6–8.4)
RBC # BLD AUTO: 3.75 M/UL (ref 4.6–6.2)
SODIUM SERPL-SCNC: 136 MMOL/L (ref 136–145)
WBC # BLD AUTO: 3.8 K/UL (ref 3.9–12.7)

## 2019-05-27 PROCEDURE — 85027 COMPLETE CBC AUTOMATED: CPT

## 2019-05-27 PROCEDURE — 77412 RADIATION TX DELIVERY LVL 3: CPT | Performed by: RADIOLOGY

## 2019-05-27 PROCEDURE — 77387 GUIDANCE FOR RADJ TX DLVR: CPT | Mod: TC | Performed by: RADIOLOGY

## 2019-05-27 PROCEDURE — G6002 STEREOSCOPIC X-RAY GUIDANCE: HCPCS | Mod: 26,,, | Performed by: RADIOLOGY

## 2019-05-27 PROCEDURE — 80053 COMPREHEN METABOLIC PANEL: CPT

## 2019-05-27 PROCEDURE — 36415 COLL VENOUS BLD VENIPUNCTURE: CPT

## 2019-05-27 PROCEDURE — G6002 PR STEREOSCOPIC XRAY GUIDE FOR RADIATION TX DELIV: ICD-10-PCS | Mod: 26,,, | Performed by: RADIOLOGY

## 2019-05-28 ENCOUNTER — OFFICE VISIT (OUTPATIENT)
Dept: HEMATOLOGY/ONCOLOGY | Facility: CLINIC | Age: 69
End: 2019-05-28
Payer: MEDICARE

## 2019-05-28 ENCOUNTER — INFUSION (OUTPATIENT)
Dept: INFUSION THERAPY | Facility: HOSPITAL | Age: 69
End: 2019-05-28
Attending: INTERNAL MEDICINE
Payer: MEDICARE

## 2019-05-28 VITALS
HEART RATE: 88 BPM | SYSTOLIC BLOOD PRESSURE: 85 MMHG | BODY MASS INDEX: 23.4 KG/M2 | WEIGHT: 167.13 LBS | DIASTOLIC BLOOD PRESSURE: 51 MMHG | TEMPERATURE: 98 F | RESPIRATION RATE: 16 BRPM | HEIGHT: 71 IN

## 2019-05-28 VITALS
SYSTOLIC BLOOD PRESSURE: 116 MMHG | HEART RATE: 79 BPM | TEMPERATURE: 99 F | DIASTOLIC BLOOD PRESSURE: 60 MMHG | RESPIRATION RATE: 18 BRPM

## 2019-05-28 DIAGNOSIS — C15.9 ESOPHAGEAL CANCER, STAGE IIA: Primary | ICD-10-CM

## 2019-05-28 PROCEDURE — 1101F PR PT FALLS ASSESS DOC 0-1 FALLS W/OUT INJ PAST YR: ICD-10-PCS | Mod: CPTII,S$GLB,, | Performed by: INTERNAL MEDICINE

## 2019-05-28 PROCEDURE — 96367 TX/PROPH/DG ADDL SEQ IV INF: CPT

## 2019-05-28 PROCEDURE — 99999 PR PBB SHADOW E&M-EST. PATIENT-LVL IV: ICD-10-PCS | Mod: PBBFAC,,, | Performed by: INTERNAL MEDICINE

## 2019-05-28 PROCEDURE — 96375 TX/PRO/DX INJ NEW DRUG ADDON: CPT

## 2019-05-28 PROCEDURE — 77387 GUIDANCE FOR RADJ TX DLVR: CPT | Mod: TC | Performed by: RADIOLOGY

## 2019-05-28 PROCEDURE — 77417 THER RADIOLOGY PORT IMAGE(S): CPT | Performed by: RADIOLOGY

## 2019-05-28 PROCEDURE — G6002 PR STEREOSCOPIC XRAY GUIDE FOR RADIATION TX DELIV: ICD-10-PCS | Mod: 26,,, | Performed by: RADIOLOGY

## 2019-05-28 PROCEDURE — 77412 RADIATION TX DELIVERY LVL 3: CPT | Performed by: RADIOLOGY

## 2019-05-28 PROCEDURE — 1101F PT FALLS ASSESS-DOCD LE1/YR: CPT | Mod: CPTII,S$GLB,, | Performed by: INTERNAL MEDICINE

## 2019-05-28 PROCEDURE — 99999 PR PBB SHADOW E&M-EST. PATIENT-LVL IV: CPT | Mod: PBBFAC,,, | Performed by: INTERNAL MEDICINE

## 2019-05-28 PROCEDURE — 25000003 PHARM REV CODE 250: Performed by: INTERNAL MEDICINE

## 2019-05-28 PROCEDURE — 3074F PR MOST RECENT SYSTOLIC BLOOD PRESSURE < 130 MM HG: ICD-10-PCS | Mod: CPTII,S$GLB,, | Performed by: INTERNAL MEDICINE

## 2019-05-28 PROCEDURE — 63600175 PHARM REV CODE 636 W HCPCS: Performed by: INTERNAL MEDICINE

## 2019-05-28 PROCEDURE — 99214 OFFICE O/P EST MOD 30 MIN: CPT | Mod: S$GLB,,, | Performed by: INTERNAL MEDICINE

## 2019-05-28 PROCEDURE — 3078F PR MOST RECENT DIASTOLIC BLOOD PRESSURE < 80 MM HG: ICD-10-PCS | Mod: CPTII,S$GLB,, | Performed by: INTERNAL MEDICINE

## 2019-05-28 PROCEDURE — 3078F DIAST BP <80 MM HG: CPT | Mod: CPTII,S$GLB,, | Performed by: INTERNAL MEDICINE

## 2019-05-28 PROCEDURE — S0028 INJECTION, FAMOTIDINE, 20 MG: HCPCS | Performed by: INTERNAL MEDICINE

## 2019-05-28 PROCEDURE — 96413 CHEMO IV INFUSION 1 HR: CPT

## 2019-05-28 PROCEDURE — 99214 PR OFFICE/OUTPT VISIT, EST, LEVL IV, 30-39 MIN: ICD-10-PCS | Mod: S$GLB,,, | Performed by: INTERNAL MEDICINE

## 2019-05-28 PROCEDURE — 3074F SYST BP LT 130 MM HG: CPT | Mod: CPTII,S$GLB,, | Performed by: INTERNAL MEDICINE

## 2019-05-28 PROCEDURE — G6002 STEREOSCOPIC X-RAY GUIDANCE: HCPCS | Mod: 26,,, | Performed by: RADIOLOGY

## 2019-05-28 PROCEDURE — 96417 CHEMO IV INFUS EACH ADDL SEQ: CPT

## 2019-05-28 RX ORDER — SODIUM CHLORIDE 0.9 % (FLUSH) 0.9 %
10 SYRINGE (ML) INJECTION
OUTPATIENT
Start: 2019-06-14

## 2019-05-28 RX ORDER — FAMOTIDINE 10 MG/ML
20 INJECTION INTRAVENOUS
OUTPATIENT
Start: 2019-06-07

## 2019-05-28 RX ORDER — HEPARIN 100 UNIT/ML
500 SYRINGE INTRAVENOUS
Status: CANCELLED | OUTPATIENT
Start: 2019-05-28

## 2019-05-28 RX ORDER — DIPHENHYDRAMINE HYDROCHLORIDE 50 MG/ML
50 INJECTION INTRAMUSCULAR; INTRAVENOUS ONCE AS NEEDED
OUTPATIENT
Start: 2019-06-14

## 2019-05-28 RX ORDER — DIPHENHYDRAMINE HYDROCHLORIDE 50 MG/ML
50 INJECTION INTRAMUSCULAR; INTRAVENOUS ONCE AS NEEDED
OUTPATIENT
Start: 2019-06-07

## 2019-05-28 RX ORDER — HEPARIN 100 UNIT/ML
500 SYRINGE INTRAVENOUS
OUTPATIENT
Start: 2019-06-07

## 2019-05-28 RX ORDER — FAMOTIDINE 10 MG/ML
20 INJECTION INTRAVENOUS
Status: CANCELLED | OUTPATIENT
Start: 2019-05-29

## 2019-05-28 RX ORDER — FAMOTIDINE 10 MG/ML
20 INJECTION INTRAVENOUS
OUTPATIENT
Start: 2019-06-14

## 2019-05-28 RX ORDER — SODIUM CHLORIDE 0.9 % (FLUSH) 0.9 %
10 SYRINGE (ML) INJECTION
Status: CANCELLED | OUTPATIENT
Start: 2019-05-29

## 2019-05-28 RX ORDER — SODIUM CHLORIDE 9 MG/ML
INJECTION, SOLUTION INTRAVENOUS CONTINUOUS
Status: CANCELLED
Start: 2019-05-29

## 2019-05-28 RX ORDER — HEPARIN 100 UNIT/ML
500 SYRINGE INTRAVENOUS
Status: CANCELLED | OUTPATIENT
Start: 2019-05-29

## 2019-05-28 RX ORDER — SODIUM CHLORIDE 0.9 % (FLUSH) 0.9 %
10 SYRINGE (ML) INJECTION
Status: DISCONTINUED | OUTPATIENT
Start: 2019-05-28 | End: 2019-05-28 | Stop reason: HOSPADM

## 2019-05-28 RX ORDER — DIPHENHYDRAMINE HYDROCHLORIDE 50 MG/ML
50 INJECTION INTRAMUSCULAR; INTRAVENOUS ONCE AS NEEDED
Status: CANCELLED | OUTPATIENT
Start: 2019-05-28

## 2019-05-28 RX ORDER — SODIUM CHLORIDE 9 MG/ML
INJECTION, SOLUTION INTRAVENOUS CONTINUOUS
Status: DISCONTINUED | OUTPATIENT
Start: 2019-05-28 | End: 2019-05-28 | Stop reason: HOSPADM

## 2019-05-28 RX ORDER — SODIUM CHLORIDE 0.9 % (FLUSH) 0.9 %
10 SYRINGE (ML) INJECTION
OUTPATIENT
Start: 2019-06-07

## 2019-05-28 RX ORDER — DIPHENHYDRAMINE HYDROCHLORIDE 50 MG/ML
50 INJECTION INTRAMUSCULAR; INTRAVENOUS ONCE AS NEEDED
Status: DISCONTINUED | OUTPATIENT
Start: 2019-05-28 | End: 2019-05-28 | Stop reason: HOSPADM

## 2019-05-28 RX ORDER — HEPARIN 100 UNIT/ML
500 SYRINGE INTRAVENOUS
Status: DISCONTINUED | OUTPATIENT
Start: 2019-05-28 | End: 2019-05-28 | Stop reason: HOSPADM

## 2019-05-28 RX ORDER — HEPARIN 100 UNIT/ML
500 SYRINGE INTRAVENOUS
OUTPATIENT
Start: 2019-06-14

## 2019-05-28 RX ORDER — SODIUM CHLORIDE 9 MG/ML
INJECTION, SOLUTION INTRAVENOUS CONTINUOUS
Start: 2019-06-07

## 2019-05-28 RX ORDER — EPINEPHRINE 0.3 MG/.3ML
0.3 INJECTION SUBCUTANEOUS ONCE AS NEEDED
Status: CANCELLED | OUTPATIENT
Start: 2019-05-29

## 2019-05-28 RX ORDER — FAMOTIDINE 10 MG/ML
20 INJECTION INTRAVENOUS
Status: CANCELLED | OUTPATIENT
Start: 2019-05-28

## 2019-05-28 RX ORDER — EPINEPHRINE 0.3 MG/.3ML
0.3 INJECTION SUBCUTANEOUS ONCE AS NEEDED
Status: DISCONTINUED | OUTPATIENT
Start: 2019-05-28 | End: 2019-05-28 | Stop reason: HOSPADM

## 2019-05-28 RX ORDER — EPINEPHRINE 0.3 MG/.3ML
0.3 INJECTION SUBCUTANEOUS ONCE AS NEEDED
OUTPATIENT
Start: 2019-06-14

## 2019-05-28 RX ORDER — EPINEPHRINE 0.3 MG/.3ML
0.3 INJECTION SUBCUTANEOUS ONCE AS NEEDED
Status: CANCELLED | OUTPATIENT
Start: 2019-05-28

## 2019-05-28 RX ORDER — SODIUM CHLORIDE 0.9 % (FLUSH) 0.9 %
10 SYRINGE (ML) INJECTION
Status: CANCELLED | OUTPATIENT
Start: 2019-05-28

## 2019-05-28 RX ORDER — FAMOTIDINE 10 MG/ML
20 INJECTION INTRAVENOUS
Status: COMPLETED | OUTPATIENT
Start: 2019-05-28 | End: 2019-05-28

## 2019-05-28 RX ORDER — DIPHENHYDRAMINE HYDROCHLORIDE 50 MG/ML
50 INJECTION INTRAMUSCULAR; INTRAVENOUS ONCE AS NEEDED
Status: CANCELLED | OUTPATIENT
Start: 2019-05-29

## 2019-05-28 RX ORDER — EPINEPHRINE 0.3 MG/.3ML
0.3 INJECTION SUBCUTANEOUS ONCE AS NEEDED
OUTPATIENT
Start: 2019-06-07

## 2019-05-28 RX ORDER — SODIUM CHLORIDE 9 MG/ML
INJECTION, SOLUTION INTRAVENOUS CONTINUOUS
Start: 2019-06-14

## 2019-05-28 RX ORDER — SODIUM CHLORIDE 9 MG/ML
INJECTION, SOLUTION INTRAVENOUS CONTINUOUS
Status: CANCELLED
Start: 2019-05-28

## 2019-05-28 RX ADMIN — SODIUM CHLORIDE: 0.9 INJECTION, SOLUTION INTRAVENOUS at 02:05

## 2019-05-28 RX ADMIN — DIPHENHYDRAMINE HYDROCHLORIDE 50 MG: 50 INJECTION, SOLUTION INTRAMUSCULAR; INTRAVENOUS at 02:05

## 2019-05-28 RX ADMIN — PACLITAXEL 102 MG: 6 INJECTION, SOLUTION, CONCENTRATE INTRAVENOUS at 03:05

## 2019-05-28 RX ADMIN — DEXAMETHASONE SODIUM PHOSPHATE: 4 INJECTION, SOLUTION INTRAMUSCULAR; INTRAVENOUS at 02:05

## 2019-05-28 RX ADMIN — CARBOPLATIN 155 MG: 10 INJECTION, SOLUTION INTRAVENOUS at 04:05

## 2019-05-28 RX ADMIN — FAMOTIDINE 20 MG: 10 INJECTION, SOLUTION INTRAVENOUS at 02:05

## 2019-05-28 NOTE — PLAN OF CARE
Problem: Adult Inpatient Plan of Care  Goal: Plan of Care Review  Outcome: Ongoing (interventions implemented as appropriate)  Patient tolerated taxol/carbo well today. NAD noted upon discharge. Also received 1 L of ivfs. Verbalized understanding to call MD for any questions/concerns. PIV removed, catheter tip intact. Denied avs, uses [facility]. Discharged home, ambulated independently with family by side.

## 2019-05-28 NOTE — PROGRESS NOTES
Subjective:       Patient ID: Brooks Canas is a 69 y.o. male.    Chief Complaint: Esophageal Cancer (follow up, labs)    HPI -     This is a 68-year-old male with a past medical history of PAD status post right femoral popliteal bypass, CKD stage 3, significant tobacco use presented to ED for dysphagia and hematemesis.     Patient reports dysphagia mainly with solids for the last 2 weeks however he was able to tolerate liquids.  He denies any odynophagia but reports to hematemesis and nausea.  He also complains of abdominal fullness but denied of any abdominal pain, diarrhea, chest pain, headaches, weight loss, shortness of breath, palpitations or leg pain. EGD performed on April 27, 2019 revealed a large fungating mass in the lower 3rd of the esophagus at the GE junction and the mass is partially obstructing and partially circumferential which was biopsied.  Advanced endoscopic services is consulted for further evaluation.  General surgery consulted for tube feed placement.  Thoracic surgery on board.  CT chest abdomen and pelvis did not reveal metastatic spread.     Here to continue weekly chemo with XRT.    Review of Systems   Constitutional: Negative for appetite change and unexpected weight change.   Eyes: Negative for visual disturbance.   Respiratory: Negative for cough and shortness of breath.    Cardiovascular: Negative for chest pain.   Gastrointestinal: Negative for abdominal pain and diarrhea.   Genitourinary: Negative for frequency.   Musculoskeletal: Negative for back pain.   Skin: Negative for rash.   Neurological: Negative for headaches.   Hematological: Negative for adenopathy.   Psychiatric/Behavioral: The patient is not nervous/anxious.        Allergies:  Review of patient's allergies indicates:  No Known Allergies    Medications:  Current Outpatient Medications   Medication Sig Dispense Refill    allopurinol (ZYLOPRIM) 100 MG tablet Take 100 mg by mouth once daily.  3    amLODIPine  (NORVASC) 10 MG tablet TAKE 1 TABLET(10 MG) BY MOUTH EVERY DAY 90 tablet 0    atorvastatin (LIPITOR) 40 MG tablet TAKE 1 TABLET BY MOUTH ONCE DAILY 30 tablet 0    benazepril (LOTENSIN) 20 MG tablet TAKE 1 TABLET(20 MG) BY MOUTH EVERY DAY 90 tablet 0    carvedilol (COREG) 6.25 MG tablet TAKE 1 TABLET(6.25 MG) BY MOUTH TWICE DAILY 180 tablet 0    ondansetron (ZOFRAN-ODT) 8 MG TbDL Take 1 tablet (8 mg total) by mouth every 8 (eight) hours as needed (nausea or vomiting - CANCER PATIENT ON CHEMO). 30 tablet 1    oxyCODONE (ROXICODONE) 5 MG immediate release tablet Take 1 tablet (5 mg total) by mouth every 6 (six) hours as needed for Pain. 28 tablet 0    pantoprazole (PROTONIX) 40 MG tablet Take 1 tablet (40 mg total) by mouth 2 (two) times daily before meals. 60 tablet 11    tamsulosin (FLOMAX) 0.4 mg Cp24 Take 0.4 mg by mouth once daily.   2    nepafenac (ILEVRO) 0.3 % DrpS Apply 1 drop to eye once daily.       No current facility-administered medications for this visit.        PMH:  Past Medical History:   Diagnosis Date    BPH (benign prostatic hyperplasia)     Chronic kidney disease, stage III (moderate) 3/1/2016    12/10/2015: BUN/crea: 32/1.63. CrCl 43.    Claudication in peripheral vascular disease     History of tobacco use 11/4/2016    1970: Began smoking.   3/1/2016: Quit smoking.    Hypertension, benign 3/1/2016    2012: Diagnosed.    Hyperuricemia     Leg swelling 3/14/2016    3/4/2016: Began having right leg edema.    PAD (peripheral artery disease)     Peripheral artery disease 3/1/2016    2014: Began experience bilateral calf claudication.  1/2016: [facility]: Arterial Duplex: Right: SFA: distal severe. RAVI 0.78. Left: SFA: mid occlusion. RAVI 0.65. 2/1/2016: Began experience pain in right great toe.    Tobacco use 3/1/2016    1970: Began smoking. Unable to quit.    Vitamin D deficiency disease        PSH:  Past Surgical History:   Procedure Laterality Date    ANGIOGRAM-EXTREMITY-LOWER  Bilateral 3/2/2016    [facility]formed [facility] CATH LAB    AORTOGRAM WITH RUNOFF Bilateral 5/4/2017    Performed by [facility] at [facility] CATH LAB    APPENDECTOMY      TBUJGW-EOSOAXS-KOHJFWPKY Right 3/4/2016    Per[facility] by Aris Finney Jr., [facility] at [facility] OR    CATARACT EXTRACTION Right     EGD (ESOPHAGOGASTRODUODENOSCOPY) Left 4/27/2019    Performed by Carine Le MD at [facility] ENDO    FEMORAL BYPASS Right 2016    INSERTION, JEJUNOSTOMY TUBE N/A 5/1/2019    Performed by Jose G [facility]ell MD at [facility] OR 2ND FLR    LAPAROTOMY, EXPLORATORY N/A 5/2/2019    Performed by Everardo Smith MD at [facility] OR 2ND FLR    REVISION- Jejunostomy N/A 5/2/2019    Performed by Everardo Smith MD at [facility] OR 2ND FLR    ULTRASOUND, UPPER GI TRACT, ENDOSCOPIC N/A 5/3/2019    Performed by Pacheco Ferreira MD at [facility] ENDO (2ND FLR)       FamHx:  No family history on file.    SocHx:  Social History     Socioeconomic History    Marital status: Single     Spouse name: Not on file    Number of children: Not on file    Years of education: Not on file    Highest education level: Not on file   Occupational History    Not on file   Social Needs    Financial resource strain: Not on file    Food insecurity:     Worry: Not on file     Inability: Not on file    Transportation needs:     Medical: Not on file     Non-medical: Not on file   Tobacco Use    Smoking status: Former Smoker     Packs/day: 0.75     Years: 46.00     Pack years: 34.50     Start date: 1/1/1970     Last attempt to quit: 3/1/2016     Years since quitting: 3.2    Smokeless tobacco: Never Used   Substance and Sexual Activity    Alcohol use: No     Alcohol/week: 0.0 oz    Drug use: Not Currently    Sexual activity: Not on file   Lifestyle    Physical activity:     Days per week: Not on file     Minutes per session: Not on file    Stress: Not on file   Relationships    Social connections:     Talks on phone: Not on file     Gets together:  Not on file     Attends Catholic service: Not on file     Active member of club or organization: Not on file     Attends meetings of clubs or organizations: Not on file     Relationship status: Not on file   Other Topics Concern    Not on file   Social History Narrative    Not on file       Distress Score    Distress Score: 2        Practical Problems Physical Problems   : No Appearance: No   Housing: No Bathing / Dressing: No   Insurance / Financial: No Breathing: No    Transportation: No  Changes in Urination: No    Work / School: No  Constipation: No   Treatment Decisions: No  Diarrhea: No     Eating: No    Family Problems Fatigue: No    Dealing with Children: No Feeling Swollen: No    Dealing with Partner: No Fevers: No    Ability to Have Children: No  Getting Around: No    Family Health Issues: No  Indigestion: No     Memory / Concentration: No   Emotional Problems Mouth Sores: No    Depression: No  Nausea: No    Fears: No  Nose Dry / Congested: No    Nervousness: No  Pain: No    Sadness: No Sexual: No    Worry: No Skin Dry / Itchy: No    Loss of Interest in Usual Activities: No Sleep: No     Substance Abuse: No    Spiritual/Religions Concerns Tingling in Hands / Feet: No   Spritual / Yazidism Concerns: No         Other Problems                Objective:      Physical Exam   Constitutional: He is oriented to person, place, and time. He appears well-developed and well-nourished. No distress.   HENT:   Head: Normocephalic and atraumatic.   Mouth/Throat: No oropharyngeal exudate.   Eyes: Pupils are equal, round, and reactive to light. EOM are normal. Right eye exhibits no discharge. Left eye exhibits no discharge. No scleral icterus.   Neck: Normal range of motion.   Musculoskeletal: Normal range of motion. He exhibits no edema or deformity.   Neurological: He is alert and oriented to person, place, and time. No cranial nerve deficit.   Skin: Skin is warm and dry. No rash noted. He is not  diaphoretic. No erythema. No pallor.   Psychiatric: He has a normal mood and affect. His behavior is normal. Judgment and thought content normal.         Assessment:       1. Esophageal cancer, stage IIA          Plan:         1. Esophageal Cancer:    Stage IIA, undergoing XRT, here to con't weekly chemo.      Hyperkalemia - Kayexlate x2 sent to pharmacy.      Chemo/XRT will be followed by an attempt at resection by Dr. Vásquez.    RTC 1 week for chemo, to see me or Caryn, and CBC, CMP.    The patient agrees with the plan, and all questions have been answered to their satisfaction.      More than 25 mins were spent during this encounter, greater than 50% was spent in direct counseling and/or coordination of care.     Yuan Lewis M.D., M.S., F.A.C.P.  Hematology and Oncology Attending  [facility]

## 2019-05-29 ENCOUNTER — TELEPHONE (OUTPATIENT)
Dept: HEMATOLOGY/ONCOLOGY | Facility: CLINIC | Age: 69
End: 2019-05-29

## 2019-05-29 ENCOUNTER — DOCUMENTATION ONLY (OUTPATIENT)
Dept: RADIATION ONCOLOGY | Facility: CLINIC | Age: 69
End: 2019-05-29

## 2019-05-29 PROCEDURE — G6002 PR STEREOSCOPIC XRAY GUIDE FOR RADIATION TX DELIV: ICD-10-PCS | Mod: 26,,, | Performed by: RADIOLOGY

## 2019-05-29 PROCEDURE — 77336 RADIATION PHYSICS CONSULT: CPT | Performed by: RADIOLOGY

## 2019-05-29 PROCEDURE — G6002 STEREOSCOPIC X-RAY GUIDANCE: HCPCS | Mod: 26,,, | Performed by: RADIOLOGY

## 2019-05-29 PROCEDURE — 77412 RADIATION TX DELIVERY LVL 3: CPT | Performed by: RADIOLOGY

## 2019-05-29 PROCEDURE — 77387 GUIDANCE FOR RADJ TX DLVR: CPT | Mod: TC | Performed by: RADIOLOGY

## 2019-05-29 RX ORDER — SODIUM POLYSTYRENE SULFONATE 4.1 MEQ/G
15 POWDER, FOR SUSPENSION ORAL; RECTAL DAILY
Qty: 30 G | Refills: 0 | Status: SHIPPED | OUTPATIENT
Start: 2019-05-29 | End: 2019-06-01

## 2019-05-29 NOTE — TELEPHONE ENCOUNTER
----- Message from Yuan Lewis MD sent at 5/28/2019  5:15 PM CDT -----  RTC 1 week for chemo, to see me or Caryn, and CBC, CMP.

## 2019-05-30 PROCEDURE — G6002 STEREOSCOPIC X-RAY GUIDANCE: HCPCS | Mod: 26,,, | Performed by: RADIOLOGY

## 2019-05-30 PROCEDURE — 77412 RADIATION TX DELIVERY LVL 3: CPT | Performed by: RADIOLOGY

## 2019-05-30 PROCEDURE — 77387 GUIDANCE FOR RADJ TX DLVR: CPT | Mod: TC | Performed by: RADIOLOGY

## 2019-05-30 PROCEDURE — G6002 PR STEREOSCOPIC XRAY GUIDE FOR RADIATION TX DELIV: ICD-10-PCS | Mod: 26,,, | Performed by: RADIOLOGY

## 2019-05-31 PROCEDURE — 77412 RADIATION TX DELIVERY LVL 3: CPT | Performed by: RADIOLOGY

## 2019-05-31 PROCEDURE — G6002 STEREOSCOPIC X-RAY GUIDANCE: HCPCS | Mod: 26,,, | Performed by: RADIOLOGY

## 2019-05-31 PROCEDURE — G6002 PR STEREOSCOPIC XRAY GUIDE FOR RADIATION TX DELIV: ICD-10-PCS | Mod: 26,,, | Performed by: RADIOLOGY

## 2019-05-31 PROCEDURE — 77387 GUIDANCE FOR RADJ TX DLVR: CPT | Mod: TC | Performed by: RADIOLOGY

## 2019-06-03 ENCOUNTER — DOCUMENTATION ONLY (OUTPATIENT)
Dept: RADIATION ONCOLOGY | Facility: CLINIC | Age: 69
End: 2019-06-03

## 2019-06-03 ENCOUNTER — HOSPITAL ENCOUNTER (OUTPATIENT)
Dept: RADIATION THERAPY | Facility: HOSPITAL | Age: 69
Discharge: HOME | End: 2019-06-03
Attending: RADIOLOGY
Payer: MEDICARE

## 2019-06-03 ENCOUNTER — HOSPITAL ENCOUNTER (OUTPATIENT)
Dept: RADIOLOGY | Facility: HOSPITAL | Age: 69
Discharge: HOME | End: 2019-06-03
Attending: RADIOLOGY
Payer: MEDICARE

## 2019-06-03 DIAGNOSIS — C15.9 ESOPHAGEAL CANCER, STAGE IIA: Primary | ICD-10-CM

## 2019-06-03 DIAGNOSIS — C15.9 ESOPHAGEAL CANCER, STAGE IIA: ICD-10-CM

## 2019-06-03 PROCEDURE — 77412 RADIATION TX DELIVERY LVL 3: CPT | Performed by: RADIOLOGY

## 2019-06-03 PROCEDURE — 70491 CT SOFT TISSUE NECK W/DYE: CPT | Mod: 26,,, | Performed by: RADIOLOGY

## 2019-06-03 PROCEDURE — 70491 CT SOFT TISSUE NECK W/DYE: CPT | Mod: TC

## 2019-06-03 PROCEDURE — 77417 THER RADIOLOGY PORT IMAGE(S): CPT | Performed by: RADIOLOGY

## 2019-06-03 PROCEDURE — G6002 PR STEREOSCOPIC XRAY GUIDE FOR RADIATION TX DELIV: ICD-10-PCS | Mod: 26,,, | Performed by: RADIOLOGY

## 2019-06-03 PROCEDURE — 70491 CT SOFT TISSUE NECK WITH CONTRAST: ICD-10-PCS | Mod: 26,,, | Performed by: RADIOLOGY

## 2019-06-03 PROCEDURE — G6002 STEREOSCOPIC X-RAY GUIDANCE: HCPCS | Mod: 26,,, | Performed by: RADIOLOGY

## 2019-06-03 PROCEDURE — 77387 GUIDANCE FOR RADJ TX DLVR: CPT | Mod: TC | Performed by: RADIOLOGY

## 2019-06-03 PROCEDURE — 25500020 PHARM REV CODE 255: Performed by: RADIOLOGY

## 2019-06-03 RX ADMIN — IOHEXOL 75 ML: 350 INJECTION, SOLUTION INTRAVENOUS at 04:06

## 2019-06-03 NOTE — PLAN OF CARE
Problem: Adult Inpatient Plan of Care  Goal: Plan of Care Review  Outcome: Ongoing (interventions implemented as appropriate)  Day 21 of outpatient radiation to the esophagus unable to take anything by mouth  Using J tube.  Dr Morgan evaluated and ordered ct

## 2019-06-04 ENCOUNTER — PATIENT MESSAGE (OUTPATIENT)
Dept: HEMATOLOGY/ONCOLOGY | Facility: CLINIC | Age: 69
End: 2019-06-04

## 2019-06-04 DIAGNOSIS — R11.2 CHEMOTHERAPY INDUCED NAUSEA AND VOMITING: ICD-10-CM

## 2019-06-04 DIAGNOSIS — C15.9 ESOPHAGEAL CANCER, STAGE IIA: Primary | ICD-10-CM

## 2019-06-04 DIAGNOSIS — T45.1X5A CHEMOTHERAPY INDUCED NAUSEA AND VOMITING: ICD-10-CM

## 2019-06-04 PROCEDURE — 77387 GUIDANCE FOR RADJ TX DLVR: CPT | Mod: TC | Performed by: RADIOLOGY

## 2019-06-04 PROCEDURE — 77412 RADIATION TX DELIVERY LVL 3: CPT | Performed by: RADIOLOGY

## 2019-06-04 PROCEDURE — G6002 STEREOSCOPIC X-RAY GUIDANCE: HCPCS | Mod: 26,,, | Performed by: RADIOLOGY

## 2019-06-04 PROCEDURE — G6002 PR STEREOSCOPIC XRAY GUIDE FOR RADIATION TX DELIV: ICD-10-PCS | Mod: 26,,, | Performed by: RADIOLOGY

## 2019-06-04 RX ORDER — PROMETHAZINE HYDROCHLORIDE 6.25 MG/5ML
12.5 SYRUP ORAL EVERY 6 HOURS PRN
Qty: 473 ML | Refills: 0 | Status: SHIPPED | OUTPATIENT
Start: 2019-06-04

## 2019-06-04 RX ORDER — SUCRALFATE 1 G/1
1 TABLET ORAL 2 TIMES DAILY
Qty: 42 TABLET | Refills: 2 | Status: SHIPPED | OUTPATIENT
Start: 2019-06-04 | End: 2019-06-12

## 2019-06-05 PROCEDURE — G6002 PR STEREOSCOPIC XRAY GUIDE FOR RADIATION TX DELIV: ICD-10-PCS | Mod: 26,,, | Performed by: RADIOLOGY

## 2019-06-05 PROCEDURE — 77412 RADIATION TX DELIVERY LVL 3: CPT | Performed by: RADIOLOGY

## 2019-06-05 PROCEDURE — 77336 RADIATION PHYSICS CONSULT: CPT | Performed by: RADIOLOGY

## 2019-06-05 PROCEDURE — 77387 GUIDANCE FOR RADJ TX DLVR: CPT | Mod: TC | Performed by: RADIOLOGY

## 2019-06-05 PROCEDURE — G6002 STEREOSCOPIC X-RAY GUIDANCE: HCPCS | Mod: 26,,, | Performed by: RADIOLOGY

## 2019-06-06 ENCOUNTER — OFFICE VISIT (OUTPATIENT)
Dept: HEMATOLOGY/ONCOLOGY | Facility: CLINIC | Age: 69
End: 2019-06-06
Payer: MEDICARE

## 2019-06-06 ENCOUNTER — INFUSION (OUTPATIENT)
Dept: INFUSION THERAPY | Facility: HOSPITAL | Age: 69
End: 2019-06-06
Attending: INTERNAL MEDICINE
Payer: MEDICARE

## 2019-06-06 VITALS
HEART RATE: 108 BPM | TEMPERATURE: 99 F | HEIGHT: 71 IN | RESPIRATION RATE: 18 BRPM | DIASTOLIC BLOOD PRESSURE: 75 MMHG | SYSTOLIC BLOOD PRESSURE: 127 MMHG | OXYGEN SATURATION: 99 % | WEIGHT: 161.63 LBS | BODY MASS INDEX: 22.63 KG/M2

## 2019-06-06 VITALS
HEART RATE: 111 BPM | SYSTOLIC BLOOD PRESSURE: 123 MMHG | DIASTOLIC BLOOD PRESSURE: 70 MMHG | HEIGHT: 70 IN | RESPIRATION RATE: 18 BRPM | TEMPERATURE: 99 F | BODY MASS INDEX: 23.14 KG/M2 | WEIGHT: 161.63 LBS

## 2019-06-06 DIAGNOSIS — C15.9 ESOPHAGEAL CANCER, STAGE IIA: Primary | ICD-10-CM

## 2019-06-06 PROCEDURE — 3078F DIAST BP <80 MM HG: CPT | Mod: CPTII,S$GLB,, | Performed by: INTERNAL MEDICINE

## 2019-06-06 PROCEDURE — 96375 TX/PRO/DX INJ NEW DRUG ADDON: CPT

## 2019-06-06 PROCEDURE — 77387 GUIDANCE FOR RADJ TX DLVR: CPT | Mod: TC | Performed by: RADIOLOGY

## 2019-06-06 PROCEDURE — 3074F PR MOST RECENT SYSTOLIC BLOOD PRESSURE < 130 MM HG: ICD-10-PCS | Mod: CPTII,S$GLB,, | Performed by: INTERNAL MEDICINE

## 2019-06-06 PROCEDURE — 99999 PR PBB SHADOW E&M-EST. PATIENT-LVL IV: ICD-10-PCS | Mod: PBBFAC,,, | Performed by: INTERNAL MEDICINE

## 2019-06-06 PROCEDURE — 63600175 PHARM REV CODE 636 W HCPCS: Performed by: INTERNAL MEDICINE

## 2019-06-06 PROCEDURE — 1101F PR PT FALLS ASSESS DOC 0-1 FALLS W/OUT INJ PAST YR: ICD-10-PCS | Mod: CPTII,S$GLB,, | Performed by: INTERNAL MEDICINE

## 2019-06-06 PROCEDURE — S0028 INJECTION, FAMOTIDINE, 20 MG: HCPCS | Performed by: INTERNAL MEDICINE

## 2019-06-06 PROCEDURE — 3078F PR MOST RECENT DIASTOLIC BLOOD PRESSURE < 80 MM HG: ICD-10-PCS | Mod: CPTII,S$GLB,, | Performed by: INTERNAL MEDICINE

## 2019-06-06 PROCEDURE — 77412 RADIATION TX DELIVERY LVL 3: CPT | Performed by: RADIOLOGY

## 2019-06-06 PROCEDURE — 96367 TX/PROPH/DG ADDL SEQ IV INF: CPT

## 2019-06-06 PROCEDURE — 99214 OFFICE O/P EST MOD 30 MIN: CPT | Mod: S$GLB,,, | Performed by: INTERNAL MEDICINE

## 2019-06-06 PROCEDURE — 3074F SYST BP LT 130 MM HG: CPT | Mod: CPTII,S$GLB,, | Performed by: INTERNAL MEDICINE

## 2019-06-06 PROCEDURE — G6002 PR STEREOSCOPIC XRAY GUIDE FOR RADIATION TX DELIV: ICD-10-PCS | Mod: 26,,, | Performed by: RADIOLOGY

## 2019-06-06 PROCEDURE — G6002 STEREOSCOPIC X-RAY GUIDANCE: HCPCS | Mod: 26,,, | Performed by: RADIOLOGY

## 2019-06-06 PROCEDURE — 96413 CHEMO IV INFUSION 1 HR: CPT

## 2019-06-06 PROCEDURE — 96417 CHEMO IV INFUS EACH ADDL SEQ: CPT

## 2019-06-06 PROCEDURE — 99999 PR PBB SHADOW E&M-EST. PATIENT-LVL IV: CPT | Mod: PBBFAC,,, | Performed by: INTERNAL MEDICINE

## 2019-06-06 PROCEDURE — 1101F PT FALLS ASSESS-DOCD LE1/YR: CPT | Mod: CPTII,S$GLB,, | Performed by: INTERNAL MEDICINE

## 2019-06-06 PROCEDURE — 99214 PR OFFICE/OUTPT VISIT, EST, LEVL IV, 30-39 MIN: ICD-10-PCS | Mod: S$GLB,,, | Performed by: INTERNAL MEDICINE

## 2019-06-06 PROCEDURE — 25000003 PHARM REV CODE 250: Performed by: INTERNAL MEDICINE

## 2019-06-06 PROCEDURE — 96361 HYDRATE IV INFUSION ADD-ON: CPT

## 2019-06-06 RX ORDER — SODIUM CHLORIDE 0.9 % (FLUSH) 0.9 %
10 SYRINGE (ML) INJECTION
Status: DISCONTINUED | OUTPATIENT
Start: 2019-06-06 | End: 2019-06-06 | Stop reason: HOSPADM

## 2019-06-06 RX ORDER — HEPARIN 100 UNIT/ML
500 SYRINGE INTRAVENOUS
Status: DISCONTINUED | OUTPATIENT
Start: 2019-06-06 | End: 2019-06-06 | Stop reason: HOSPADM

## 2019-06-06 RX ORDER — FAMOTIDINE 10 MG/ML
20 INJECTION INTRAVENOUS
Status: COMPLETED | OUTPATIENT
Start: 2019-06-06 | End: 2019-06-06

## 2019-06-06 RX ORDER — EPINEPHRINE 0.3 MG/.3ML
0.3 INJECTION SUBCUTANEOUS ONCE AS NEEDED
Status: DISCONTINUED | OUTPATIENT
Start: 2019-06-06 | End: 2019-06-06 | Stop reason: HOSPADM

## 2019-06-06 RX ORDER — DIPHENHYDRAMINE HYDROCHLORIDE 50 MG/ML
50 INJECTION INTRAMUSCULAR; INTRAVENOUS ONCE AS NEEDED
Status: DISCONTINUED | OUTPATIENT
Start: 2019-06-06 | End: 2019-06-06 | Stop reason: HOSPADM

## 2019-06-06 RX ORDER — SODIUM CHLORIDE 9 MG/ML
INJECTION, SOLUTION INTRAVENOUS CONTINUOUS
Status: DISCONTINUED | OUTPATIENT
Start: 2019-06-06 | End: 2019-06-06 | Stop reason: HOSPADM

## 2019-06-06 RX ADMIN — PACLITAXEL 102 MG: 6 INJECTION, SOLUTION, CONCENTRATE INTRAVENOUS at 12:06

## 2019-06-06 RX ADMIN — CARBOPLATIN 160 MG: 10 INJECTION, SOLUTION INTRAVENOUS at 01:06

## 2019-06-06 RX ADMIN — DEXAMETHASONE SODIUM PHOSPHATE: 4 INJECTION, SOLUTION INTRA-ARTICULAR; INTRALESIONAL; INTRAMUSCULAR; INTRAVENOUS; SOFT TISSUE at 11:06

## 2019-06-06 RX ADMIN — DIPHENHYDRAMINE HYDROCHLORIDE 50 MG: 50 INJECTION, SOLUTION INTRAMUSCULAR; INTRAVENOUS at 12:06

## 2019-06-06 RX ADMIN — SODIUM CHLORIDE: 0.9 INJECTION, SOLUTION INTRAVENOUS at 11:06

## 2019-06-06 RX ADMIN — FAMOTIDINE 20 MG: 10 INJECTION, SOLUTION INTRAVENOUS at 11:06

## 2019-06-06 NOTE — PROGRESS NOTES
Subjective:       Patient ID: Brooks Canas is a 69 y.o. male.    Chief Complaint: Esophageal cancer, stage IIA    HPI -     This is a 68-year-old male with a past medical history of PAD status post right femoral popliteal bypass, CKD stage 3, significant tobacco use presented to ED for dysphagia and hematemesis.     Patient reports dysphagia mainly with solids for the last 2 weeks however he was able to tolerate liquids.  He denies any odynophagia but reports to hematemesis and nausea.  He also complains of abdominal fullness but denied of any abdominal pain, diarrhea, chest pain, headaches, weight loss, shortness of breath, palpitations or leg pain. EGD performed on April 27, 2019 revealed a large fungating mass in the lower 3rd of the esophagus at the GE junction and the mass is partially obstructing and partially circumferential which was biopsied.  Advanced endoscopic services is consulted for further evaluation.  General surgery consulted for tube feed placement.  Thoracic surgery on board.  CT chest abdomen and pelvis did not reveal metastatic spread.     Here to continue weekly chemo with XRT.    Review of Systems   Constitutional: Negative for appetite change and unexpected weight change.   Eyes: Negative for visual disturbance.   Respiratory: Negative for cough and shortness of breath.    Cardiovascular: Negative for chest pain.   Gastrointestinal: Negative for abdominal pain and diarrhea.   Genitourinary: Negative for frequency.   Musculoskeletal: Negative for back pain.   Skin: Negative for rash.   Neurological: Negative for headaches.   Hematological: Negative for adenopathy.   Psychiatric/Behavioral: The patient is not nervous/anxious.        Allergies:  Review of patient's allergies indicates:  No Known Allergies    Medications:  Current Outpatient Medications   Medication Sig Dispense Refill    magic mouthwash diphen/antac/lidoc/nysta Swish and swallow 10 mLs 4 (four) times daily. 500 mL  5    ondansetron (ZOFRAN-ODT) 8 MG TbDL Take 1 tablet (8 mg total) by mouth every 8 (eight) hours as needed (nausea or vomiting - CANCER PATIENT ON CHEMO). 30 tablet 1    promethazine (PHENERGAN) 6.25 mg/5 mL syrup Take 10 mLs (12.5 mg total) by mouth every 6 (six) hours as needed for Nausea. 473 mL 0    allopurinol (ZYLOPRIM) 100 MG tablet Take 100 mg by mouth once daily.  3    amLODIPine (NORVASC) 10 MG tablet TAKE 1 TABLET(10 MG) BY MOUTH EVERY DAY 90 tablet 0    atorvastatin (LIPITOR) 40 MG tablet TAKE 1 TABLET BY MOUTH ONCE DAILY 30 tablet 0    benazepril (LOTENSIN) 20 MG tablet TAKE 1 TABLET(20 MG) BY MOUTH EVERY DAY 90 tablet 0    carvedilol (COREG) 6.25 MG tablet TAKE 1 TABLET(6.25 MG) BY MOUTH TWICE DAILY 180 tablet 0    nepafenac (ILEVRO) 0.3 % DrpS Apply 1 drop to eye once daily.      oxyCODONE (ROXICODONE) 5 MG immediate release tablet Take 1 tablet (5 mg total) by mouth every 6 (six) hours as needed for Pain. 28 tablet 0    sucralfate (CARAFATE) 1 gram tablet Take 1 tablet (1 g total) by mouth 2 (two) times daily. 42 tablet 2    tamsulosin (FLOMAX) 0.4 mg Cp24 Take 0.4 mg by mouth once daily.   2     No current facility-administered medications for this visit.        PMH:  Past Medical History:   Diagnosis Date    BPH (benign prostatic hyperplasia)     Chronic kidney disease, stage III (moderate) 3/1/2016    12/10/2015: BUN/crea: 32/1.63. CrCl 43.    Claudication in peripheral vascular disease     History of tobacco use 11/4/2016    1970: Began smoking.   3/1/2016: Quit smoking.    Hypertension, benign 3/1/2016    2012: Diagnosed.    Hyperuricemia     Leg swelling 3/14/2016    3/4/2016: Began having right leg edema.    PAD (peripheral artery disease)     Peripheral artery disease 3/1/2016    2014: Began experience bilateral calf claudication.  1/2016: [facility]: Arterial Duplex: Right: SFA: distal severe. RAVI 0.78. Left: SFA: mid occlusion. RAVI 0.65. 2/1/2016: Began experience pain in  right great toe.    Tobacco use 3/1/2016    1970: Began smoking. Unable to quit.    Vitamin D deficiency disease        PSH:  Past Surgical History:   Procedure Laterality Date    ANGIOGRAM-EXTREMITY-LOWER Bilateral 3/2/2016    [facility]    AORTOGRAM WITH RUNOFF Bilateral 5/4/2017    [facility] by [facility] at [facility] C[facility] LAB    APPENDECTOMY      [id number] Right 3/4/2016    Per[facility] by Aris Finney Jr., [facility] at [facility] OR    CATARACT EXTRACTION Right     EGD (ESOPHAGOGASTRODUODENOSCOPY) Left 4/27/2019    Performed by Carine Le MD at [facility] ENDO    FEMORAL BYPASS Right 2016    INSERTION, JEJUNOSTOMY TUBE N/A 5/1/2019    [facility]formed [facility] 2ND FLR    LAPAROTOMY, EXPLORATORY N/A 5/2/2019    Performed by [facility]ardo Smith [facility] at [facility] OR 2ND FLR    REVISION- Jejunostomy N/A 5/2/2019    Performed by [facility] at [facility] OR 2ND FLR    ULTRASOUND, UPPER GI TRACT, ENDOSCOPIC N/A 5/3/2019    Performed by Pacheco Ferreira MD at [facility] ENDO (2ND FLR)       FamHx:  No family history on file.    SocHx:  Social History     Socioeconomic History    Marital status: Single     Spouse name: Not on file    Number of children: Not on file    Years of education: Not on file    Highest education level: Not on file   Occupational History    Not on file   Social Needs    Financial resource strain: Not on file    Food insecurity:     Worry: Not on file     Inability: Not on file    Transportation needs:     Medical: Not on file     Non-medical: Not on file   Tobacco Use    Smoking status: Former Smoker     Packs/day: 0.75     Years: 46.00     Pack years: 34.50     Start date: 1/1/1970     Last attempt to quit: 3/1/2016     Years since quitting: 3.2    Smokeless tobacco: Never Used   Substance and Sexual Activity    Alcohol use: No     Alcohol/week: 0.0 oz    Drug use: Not Currently    Sexual activity: Not on file    Lifestyle    Physical activity:     Days per week: Not on file     Minutes per session: Not on file    Stress: Not on file   Relationships    Social connections:     Talks on phone: Not on file     Gets together: Not on file     Attends Latter day service: Not on file     Active member of club or organization: Not on file     Attends meetings of clubs or organizations: Not on file     Relationship status: Not on file   Other Topics Concern    Not on file   Social History Narrative    Not on file         Objective:      Physical Exam   Constitutional: He is oriented to person, place, and time. He appears well-developed and well-nourished. No distress.   HENT:   Head: Normocephalic and atraumatic.   Mouth/Throat: No oropharyngeal exudate.   Eyes: Pupils are equal, round, and reactive to light. EOM are normal. Right eye exhibits no discharge. Left eye exhibits no discharge. No scleral icterus.   Neck: Normal range of motion.   Musculoskeletal: Normal range of motion. He exhibits no edema or deformity.   Neurological: He is alert and oriented to person, place, and time. No cranial nerve deficit.   Skin: Skin is warm and dry. No rash noted. He is not diaphoretic. No erythema. No pallor.   Psychiatric: He has a normal mood and affect. His behavior is normal. Judgment and thought content normal.         Assessment:       No diagnosis found.      Plan:         1. Esophageal Cancer:    Stage IIA, undergoing XRT, here to con't weekly chemo.  ANC borderline.  Will monitor.      Hyperkalemia - resolved.      Chemo/XRT will be followed by an attempt at resection by Dr. Vásquez.    RTC 1 week for chemo, to see me or Caryn, and CBC, CMP.    The patient agrees with the plan, and all questions have been answered to their satisfaction.      More than 25 mins were spent during this encounter, greater than 50% was spent in direct counseling and/or coordination of care.     Yuan Lewis M.D., M.S., F.A.C.P.  Hematology and  Oncology Attending  [facility]  [facility]

## 2019-06-06 NOTE — PLAN OF CARE
Problem: Adult Inpatient Plan of Care  Goal: Plan of Care Review  Outcome: Ongoing (interventions implemented as appropriate)  Did well with treatment today went home via w/c getting xrt as well.

## 2019-06-07 PROCEDURE — 77412 RADIATION TX DELIVERY LVL 3: CPT | Performed by: RADIOLOGY

## 2019-06-07 PROCEDURE — G6002 STEREOSCOPIC X-RAY GUIDANCE: HCPCS | Mod: 26,,, | Performed by: RADIOLOGY

## 2019-06-07 PROCEDURE — G6002 PR STEREOSCOPIC XRAY GUIDE FOR RADIATION TX DELIV: ICD-10-PCS | Mod: 26,,, | Performed by: RADIOLOGY

## 2019-06-07 PROCEDURE — 77387 GUIDANCE FOR RADJ TX DLVR: CPT | Mod: TC | Performed by: RADIOLOGY

## 2019-06-10 ENCOUNTER — DOCUMENTATION ONLY (OUTPATIENT)
Dept: RADIATION ONCOLOGY | Facility: CLINIC | Age: 69
End: 2019-06-10

## 2019-06-10 PROCEDURE — 77412 RADIATION TX DELIVERY LVL 3: CPT | Performed by: RADIOLOGY

## 2019-06-10 PROCEDURE — G6002 STEREOSCOPIC X-RAY GUIDANCE: HCPCS | Mod: 26,,, | Performed by: RADIOLOGY

## 2019-06-10 PROCEDURE — 77387 GUIDANCE FOR RADJ TX DLVR: CPT | Mod: TC | Performed by: RADIOLOGY

## 2019-06-10 PROCEDURE — G6002 PR STEREOSCOPIC XRAY GUIDE FOR RADIATION TX DELIV: ICD-10-PCS | Mod: 26,,, | Performed by: RADIOLOGY

## 2019-06-10 NOTE — PLAN OF CARE
Problem: Adult Inpatient Plan of Care  Goal: Plan of Care Review  Outcome: Ongoing (interventions implemented as appropriate)  Day 26 of outpatient radiation to the esophagus taking water by mouth  Uses j-tube  Continues to c/o nausea

## 2019-06-11 PROCEDURE — 77387 GUIDANCE FOR RADJ TX DLVR: CPT | Mod: TC | Performed by: RADIOLOGY

## 2019-06-11 PROCEDURE — G6002 STEREOSCOPIC X-RAY GUIDANCE: HCPCS | Mod: 26,,, | Performed by: RADIOLOGY

## 2019-06-11 PROCEDURE — 77412 RADIATION TX DELIVERY LVL 3: CPT | Performed by: RADIOLOGY

## 2019-06-11 PROCEDURE — G6002 PR STEREOSCOPIC XRAY GUIDE FOR RADIATION TX DELIV: ICD-10-PCS | Mod: 26,,, | Performed by: RADIOLOGY

## 2019-06-12 ENCOUNTER — PATIENT MESSAGE (OUTPATIENT)
Dept: HEMATOLOGY/ONCOLOGY | Facility: CLINIC | Age: 69
End: 2019-06-12

## 2019-06-12 ENCOUNTER — DOCUMENTATION ONLY (OUTPATIENT)
Dept: RADIATION ONCOLOGY | Facility: CLINIC | Age: 69
End: 2019-06-12

## 2019-06-12 ENCOUNTER — LAB VISIT (OUTPATIENT)
Dept: LAB | Facility: HOSPITAL | Age: 69
End: 2019-06-12
Payer: MEDICARE

## 2019-06-12 ENCOUNTER — TELEPHONE (OUTPATIENT)
Dept: PHARMACY | Facility: CLINIC | Age: 69
End: 2019-06-12

## 2019-06-12 DIAGNOSIS — C15.9 ESOPHAGEAL CANCER, STAGE IIA: Primary | ICD-10-CM

## 2019-06-12 DIAGNOSIS — C15.9 ESOPHAGEAL CANCER, STAGE IIA: ICD-10-CM

## 2019-06-12 LAB
ALBUMIN SERPL BCP-MCNC: 3 G/DL (ref 3.5–5.2)
ALP SERPL-CCNC: 96 U/L (ref 55–135)
ALT SERPL W/O P-5'-P-CCNC: 62 U/L (ref 10–44)
ANION GAP SERPL CALC-SCNC: 10 MMOL/L (ref 8–16)
AST SERPL-CCNC: 31 U/L (ref 10–40)
BILIRUB SERPL-MCNC: 0.7 MG/DL (ref 0.1–1)
BUN SERPL-MCNC: 41 MG/DL (ref 8–23)
CALCIUM SERPL-MCNC: 9.3 MG/DL (ref 8.7–10.5)
CHLORIDE SERPL-SCNC: 101 MMOL/L (ref 95–110)
CO2 SERPL-SCNC: 24 MMOL/L (ref 23–29)
CREAT SERPL-MCNC: 1.3 MG/DL (ref 0.5–1.4)
ERYTHROCYTE [DISTWIDTH] IN BLOOD BY AUTOMATED COUNT: 17.6 % (ref 11.5–14.5)
EST. GFR  (AFRICAN AMERICAN): >60 ML/MIN/1.73 M^2
EST. GFR  (NON AFRICAN AMERICAN): 54.9 ML/MIN/1.73 M^2
GLUCOSE SERPL-MCNC: 148 MG/DL (ref 70–110)
HCT VFR BLD AUTO: 38.8 % (ref 40–54)
HGB BLD-MCNC: 12.5 G/DL (ref 14–18)
IMM GRANULOCYTES # BLD AUTO: 0.04 K/UL (ref 0–0.04)
MCH RBC QN AUTO: 27.9 PG (ref 27–31)
MCHC RBC AUTO-ENTMCNC: 32.2 G/DL (ref 32–36)
MCV RBC AUTO: 87 FL (ref 82–98)
NEUTROPHILS # BLD AUTO: 2.4 K/UL (ref 1.8–7.7)
PLATELET # BLD AUTO: 141 K/UL (ref 150–350)
PMV BLD AUTO: 10.4 FL (ref 9.2–12.9)
POTASSIUM SERPL-SCNC: 5.4 MMOL/L (ref 3.5–5.1)
PROT SERPL-MCNC: 5.7 G/DL (ref 6–8.4)
RBC # BLD AUTO: 4.48 M/UL (ref 4.6–6.2)
SODIUM SERPL-SCNC: 135 MMOL/L (ref 136–145)
WBC # BLD AUTO: 2.97 K/UL (ref 3.9–12.7)

## 2019-06-12 PROCEDURE — 80053 COMPREHEN METABOLIC PANEL: CPT

## 2019-06-12 PROCEDURE — 36415 COLL VENOUS BLD VENIPUNCTURE: CPT

## 2019-06-12 PROCEDURE — G6002 STEREOSCOPIC X-RAY GUIDANCE: HCPCS | Mod: 26,,, | Performed by: RADIOLOGY

## 2019-06-12 PROCEDURE — 85027 COMPLETE CBC AUTOMATED: CPT

## 2019-06-12 PROCEDURE — 77412 RADIATION TX DELIVERY LVL 3: CPT | Performed by: RADIOLOGY

## 2019-06-12 PROCEDURE — 77336 RADIATION PHYSICS CONSULT: CPT | Performed by: RADIOLOGY

## 2019-06-12 PROCEDURE — G6002 PR STEREOSCOPIC XRAY GUIDE FOR RADIATION TX DELIV: ICD-10-PCS | Mod: 26,,, | Performed by: RADIOLOGY

## 2019-06-12 PROCEDURE — 77387 GUIDANCE FOR RADJ TX DLVR: CPT | Mod: TC | Performed by: RADIOLOGY

## 2019-06-12 RX ORDER — SUCRALFATE 1 G/10ML
1 SUSPENSION ORAL 4 TIMES DAILY
Qty: 420 ML | Refills: 1 | Status: SHIPPED | OUTPATIENT
Start: 2019-06-12 | End: 2020-06-11

## 2019-06-12 NOTE — PLAN OF CARE
Problem: Adult Inpatient Plan of Care  Goal: Plan of Care Review  Outcome: Outcome(s) achieved Date Met: 06/12/19  Outpatient radiation to the esophagus completed on 6/12/19  F/u appt made

## 2019-06-13 DIAGNOSIS — C15.9 ESOPHAGEAL CANCER, STAGE IIA: Primary | ICD-10-CM

## 2019-06-14 ENCOUNTER — TELEPHONE (OUTPATIENT)
Dept: PHARMACY | Facility: CLINIC | Age: 69
End: 2019-06-14

## 2019-06-17 ENCOUNTER — PATIENT MESSAGE (OUTPATIENT)
Dept: HEMATOLOGY/ONCOLOGY | Facility: CLINIC | Age: 69
End: 2019-06-17

## 2019-06-17 ENCOUNTER — OFFICE VISIT (OUTPATIENT)
Dept: HEMATOLOGY/ONCOLOGY | Facility: CLINIC | Age: 69
End: 2019-06-17
Payer: MEDICARE

## 2019-06-17 ENCOUNTER — INFUSION (OUTPATIENT)
Dept: INFUSION THERAPY | Facility: HOSPITAL | Age: 69
End: 2019-06-17
Attending: INTERNAL MEDICINE
Payer: MEDICARE

## 2019-06-17 VITALS
HEIGHT: 71 IN | SYSTOLIC BLOOD PRESSURE: 131 MMHG | HEART RATE: 115 BPM | OXYGEN SATURATION: 100 % | TEMPERATURE: 98 F | WEIGHT: 156.06 LBS | DIASTOLIC BLOOD PRESSURE: 75 MMHG | BODY MASS INDEX: 21.85 KG/M2

## 2019-06-17 DIAGNOSIS — N40.1 BENIGN PROSTATIC HYPERPLASIA (BPH) WITH STRAINING ON URINATION: ICD-10-CM

## 2019-06-17 DIAGNOSIS — D64.81 ANTINEOPLASTIC CHEMOTHERAPY INDUCED ANEMIA: ICD-10-CM

## 2019-06-17 DIAGNOSIS — T45.1X5A CHEMOTHERAPY INDUCED NAUSEA AND VOMITING: ICD-10-CM

## 2019-06-17 DIAGNOSIS — R11.2 CHEMOTHERAPY INDUCED NAUSEA AND VOMITING: ICD-10-CM

## 2019-06-17 DIAGNOSIS — T45.1X5A ANTINEOPLASTIC CHEMOTHERAPY INDUCED ANEMIA: ICD-10-CM

## 2019-06-17 DIAGNOSIS — E86.0 DEHYDRATION DUE TO RADIATION: ICD-10-CM

## 2019-06-17 DIAGNOSIS — R63.4 WEIGHT LOSS: ICD-10-CM

## 2019-06-17 DIAGNOSIS — C15.9 ESOPHAGEAL CANCER, STAGE IIA: Primary | ICD-10-CM

## 2019-06-17 DIAGNOSIS — T45.1X5A CHEMOTHERAPY-INDUCED THROMBOCYTOPENIA: ICD-10-CM

## 2019-06-17 DIAGNOSIS — R39.16 BENIGN PROSTATIC HYPERPLASIA (BPH) WITH STRAINING ON URINATION: ICD-10-CM

## 2019-06-17 DIAGNOSIS — D69.59 CHEMOTHERAPY-INDUCED THROMBOCYTOPENIA: ICD-10-CM

## 2019-06-17 PROCEDURE — 63600175 PHARM REV CODE 636 W HCPCS: Performed by: NURSE PRACTITIONER

## 2019-06-17 PROCEDURE — 3075F PR MOST RECENT SYSTOLIC BLOOD PRESS GE 130-139MM HG: ICD-10-PCS | Mod: CPTII,S$GLB,, | Performed by: NURSE PRACTITIONER

## 2019-06-17 PROCEDURE — 96365 THER/PROPH/DIAG IV INF INIT: CPT

## 2019-06-17 PROCEDURE — 1101F PT FALLS ASSESS-DOCD LE1/YR: CPT | Mod: CPTII,S$GLB,, | Performed by: NURSE PRACTITIONER

## 2019-06-17 PROCEDURE — 99999 PR PBB SHADOW E&M-EST. PATIENT-LVL IV: ICD-10-PCS | Mod: PBBFAC,,, | Performed by: NURSE PRACTITIONER

## 2019-06-17 PROCEDURE — 1101F PR PT FALLS ASSESS DOC 0-1 FALLS W/OUT INJ PAST YR: ICD-10-PCS | Mod: CPTII,S$GLB,, | Performed by: NURSE PRACTITIONER

## 2019-06-17 PROCEDURE — 99215 OFFICE O/P EST HI 40 MIN: CPT | Mod: S$GLB,,, | Performed by: NURSE PRACTITIONER

## 2019-06-17 PROCEDURE — 25000003 PHARM REV CODE 250: Performed by: NURSE PRACTITIONER

## 2019-06-17 PROCEDURE — 99999 PR PBB SHADOW E&M-EST. PATIENT-LVL IV: CPT | Mod: PBBFAC,,, | Performed by: NURSE PRACTITIONER

## 2019-06-17 PROCEDURE — 3075F SYST BP GE 130 - 139MM HG: CPT | Mod: CPTII,S$GLB,, | Performed by: NURSE PRACTITIONER

## 2019-06-17 PROCEDURE — 3078F PR MOST RECENT DIASTOLIC BLOOD PRESSURE < 80 MM HG: ICD-10-PCS | Mod: CPTII,S$GLB,, | Performed by: NURSE PRACTITIONER

## 2019-06-17 PROCEDURE — 3078F DIAST BP <80 MM HG: CPT | Mod: CPTII,S$GLB,, | Performed by: NURSE PRACTITIONER

## 2019-06-17 PROCEDURE — 99215 PR OFFICE/OUTPT VISIT, EST, LEVL V, 40-54 MIN: ICD-10-PCS | Mod: S$GLB,,, | Performed by: NURSE PRACTITIONER

## 2019-06-17 PROCEDURE — 96361 HYDRATE IV INFUSION ADD-ON: CPT

## 2019-06-17 RX ORDER — TERAZOSIN 2 MG/1
CAPSULE ORAL
Qty: 90 CAPSULE | Refills: 1 | Status: SHIPPED | OUTPATIENT
Start: 2019-06-17

## 2019-06-17 RX ORDER — HEPARIN 100 UNIT/ML
500 SYRINGE INTRAVENOUS
Status: CANCELLED | OUTPATIENT
Start: 2019-06-17

## 2019-06-17 RX ORDER — SODIUM CHLORIDE 0.9 % (FLUSH) 0.9 %
10 SYRINGE (ML) INJECTION
Status: CANCELLED | OUTPATIENT
Start: 2019-06-17

## 2019-06-17 RX ORDER — TERAZOSIN 2 MG/1
CAPSULE ORAL
Qty: 30 CAPSULE | Refills: 1 | Status: SHIPPED | OUTPATIENT
Start: 2019-06-17 | End: 2019-06-17 | Stop reason: SDUPTHER

## 2019-06-17 RX ORDER — SODIUM CHLORIDE 0.9 % (FLUSH) 0.9 %
10 SYRINGE (ML) INJECTION
Status: DISCONTINUED | OUTPATIENT
Start: 2019-06-17 | End: 2019-06-17 | Stop reason: HOSPADM

## 2019-06-17 RX ORDER — HEPARIN 100 UNIT/ML
500 SYRINGE INTRAVENOUS
Status: DISCONTINUED | OUTPATIENT
Start: 2019-06-17 | End: 2019-06-17 | Stop reason: HOSPADM

## 2019-06-17 RX ADMIN — SODIUM CHLORIDE 1000 ML: 9 INJECTION, SOLUTION INTRAVENOUS at 04:06

## 2019-06-17 RX ADMIN — GRANISETRON HYDROCHLORIDE 1 MG: 0.1 INJECTION, SOLUTION INTRAVENOUS at 04:06

## 2019-06-17 NOTE — PLAN OF CARE
Problem: Adult Inpatient Plan of Care  Goal: Plan of Care Review  Outcome: Ongoing (interventions implemented as appropriate)  Pt tolerated Kytril and 1L NS with no complications. Pt instructed to call MD with any problems. NAD. Pt discharged home via wheelchair.

## 2019-06-17 NOTE — Clinical Note
Schedule to see dietician 9am on Wednesday.RTC with labs (CBC,CMP) and to see me or Dr. Lewis on July 3.

## 2019-06-17 NOTE — PROGRESS NOTES
Subjective:       Patient ID: Brooks Canas is a 69 y.o. male.    Chief Complaint: Esophageal cancer, stage IIA    HPI     69 y.o. male, patient of Dr. Lewis, to clinic for follow-up after completing neoadjuvant chemo/XRT on 6/12/19. Patient reports nausea,vomiting, upper abdominal pain, and poor appetite. Weight down 12 lbs in the last month. Patient has g-tube, which he is using for feedings. Patient has liquid Carafate and phenergan at home, which he has been attempting to swallow. He did not realize that he could put those medications through the g-tube. He is unable to hold down much by swallowing. Patient had constipation for 5 days and finally had a bowel movement this morning. He is holding his blood pressure medications as instructed by Dr. Lewis. He is unable to swallow his Flomax but feels he does need this medication.     Oncologic History (From Chart):  69 y.o. male with a past medical history of PAD status post right femoral popliteal bypass, CKD stage 3, significant tobacco use presented to ED for dysphagia and hematemesis.     Patient reports dysphagia mainly with solids for the last 2 weeks however he was able to tolerate liquids.  He denies any odynophagia but reports to hematemesis and nausea.  He also complains of abdominal fullness but denied of any abdominal pain, diarrhea, chest pain, headaches, weight loss, shortness of breath, palpitations or leg pain. EGD performed on April 27, 2019 revealed a large fungating mass in the lower 3rd of the esophagus at the GE junction and the mass is partially obstructing and partially circumferential which was biopsied.  Advanced endoscopic services is consulted for further evaluation.  General surgery consulted for tube feed placement.  Thoracic surgery on board.  CT chest abdomen and pelvis did not reveal metastatic spread.     Review of Systems   Constitutional: Positive for activity change, appetite change, fatigue and unexpected weight  change.   HENT: Positive for sore throat and trouble swallowing. Negative for mouth sores.    Eyes: Negative for visual disturbance.   Respiratory: Negative for cough and shortness of breath.    Cardiovascular: Negative for chest pain.   Gastrointestinal: Positive for abdominal pain, constipation, nausea and vomiting. Negative for diarrhea.   Genitourinary: Positive for difficulty urinating. Negative for frequency.   Musculoskeletal: Negative for back pain.   Skin: Negative for rash.   Neurological: Negative for headaches.   Hematological: Negative for adenopathy. Bruises/bleeds easily.   Psychiatric/Behavioral: Positive for sleep disturbance. The patient is not nervous/anxious.        Allergies:  Review of patient's allergies indicates:  No Known Allergies    Medications:  Current Outpatient Medications   Medication Sig Dispense Refill    magic mouthwash diphen/antac/lidoc/nysta Swish and swallow 10 mLs 4 (four) times daily. 500 mL 5    nepafenac (ILEVRO) 0.3 % DrpS Apply 1 drop to eye once daily.      promethazine (PHENERGAN) 6.25 mg/5 mL syrup Take 10 mLs (12.5 mg total) by mouth every 6 (six) hours as needed for Nausea. 473 mL 0    sucralfate (CARAFATE) 100 mg/mL suspension Take 10 mLs (1 g total) by mouth 4 (four) times daily. 420 mL 1    allopurinol (ZYLOPRIM) 100 MG tablet Take 100 mg by mouth once daily.  3    amLODIPine (NORVASC) 10 MG tablet TAKE 1 TABLET(10 MG) BY MOUTH EVERY DAY 90 tablet 0    atorvastatin (LIPITOR) 40 MG tablet TAKE 1 TABLET BY MOUTH ONCE DAILY 30 tablet 0    benazepril (LOTENSIN) 20 MG tablet TAKE 1 TABLET(20 MG) BY MOUTH EVERY DAY 90 tablet 0    carvedilol (COREG) 6.25 MG tablet TAKE 1 TABLET(6.25 MG) BY MOUTH TWICE DAILY 180 tablet 0    ondansetron (ZOFRAN-ODT) 8 MG TbDL Take 1 tablet (8 mg total) by mouth every 8 (eight) hours as needed (nausea or vomiting - CANCER PATIENT ON CHEMO). 30 tablet 1    oxyCODONE (ROXICODONE) 5 MG immediate release tablet Take 1 tablet (5 mg  total) by mouth every 6 (six) hours as needed for Pain. 28 tablet 0    tamsulosin (FLOMAX) 0.4 mg Cp24 Take 0.4 mg by mouth once daily.   2     No current facility-administered medications for this visit.        PMH:  Past Medical History:   Diagnosis Date    BPH (benign prostatic hyperplasia)     Chronic kidney disease, stage III (moderate) 3/1/2016    12/10/2015: BUN/crea: 32/1.63. CrCl 43.    Claudication in peripheral vascular disease     History of tobacco use 11/4/2016    1970: Began smoking.   3/1/2016: Quit smoking.    Hypertension, benign 3/1/2016    2012: Diagnosed.    Hyperuricemia     Leg swelling 3/14/2016    3/4/2016: Began having right leg edema.    PAD (peripheral artery disease)     Peripheral artery disease 3/1/2016    2014: Began experience bilateral calf claudication.  1/2016: [facility]: Arterial Duplex: Right: SFA: distal severe. RAVI 0.78. Left: SFA: mid occlusion. RAVI 0.65. 2/1/2016: Began experience pain in right great toe.    Tobacco use 3/1/2016    1970: Began smoking. Unable to quit.    Vitamin D deficiency disease        PSH:  Past Surgical History:   Procedure Laterality Date    ANGIOGRAM-EXTREMITY-LOWER Bilateral 3/2/2016    Performed by [facility]yers [facility] at [facility] CATH LAB    AORTOGRAM WITH RUNOFF Bilateral 5/4/2017    Performed by Phoenix Ayers [facility] at [facility] CATH LAB    APPENDECTOMY      [id number] Right 3/4/2016    Per[facility] by Aris Finney Jr., [facility] at [facility] OR    CATARACT EXTRACTION Right     EGD (ESOPHAGOGASTRODUODENOSCOPY) Left 4/27/2019    Performed by Carine Le MD at [facility] ENDO    FEMORAL BYPASS Right 2016    INSERTION, JEJUNOSTOMY TUBE N/A 5/1/2019    Performed by [facility] G [facility]ell [facility] at [facility] OR 2ND FLR    LAPAROTOMY, EXPLORATORY N/A 5/2/2019    Performed by [facility]ardo Smith [facility] at [facility] OR 2ND FLR    REVISION- Jejunostomy N/A 5/2/2019    Performed by [facility] Smith [facility] at [facility] OR 2ND FLR    ULTRASOUND, UPPER GI TRACT, ENDOSCOPIC N/A  5/3/2019    Performed by Pacheco Ferreira MD at [facility] ([address] [place] [address])       FamHx:  No family history on file.    SocHx:  Social History     Socioeconomic History    Marital status: Single     Spouse name: Not on file    Number of children: Not on file    Years of education: Not on file    Highest education level: Not on file   Occupational History    Not on file   Social Needs    Financial resource strain: Not on file    Food insecurity:     Worry: Not on file     Inability: Not on file    Transportation needs:     Medical: Not on file     Non-medical: Not on file   Tobacco Use    Smoking status: Former Smoker     Packs/day: 0.75     Years: 46.00     Pack years: 34.50     Start date: 1/1/1970     Last attempt to quit: 3/1/2016     Years since quitting: 3.2    Smokeless tobacco: Never Used   Substance and Sexual Activity    Alcohol use: No     Alcohol/week: 0.0 oz    Drug use: Not Currently    Sexual activity: Not on file   Lifestyle    Physical activity:     Days per week: Not on file     Minutes per session: Not on file    Stress: Not on file   Relationships    Social connections:     Talks on phone: Not on file     Gets together: Not on file     Attends Yarsani service: Not on file     Active member of club or organization: Not on file     Attends meetings of clubs or organizations: Not on file     Relationship status: Not on file   Other Topics Concern    Not on file   Social History Narrative    Not on file         Objective:     Vitals:    06/17/19 1515   BP: 131/75   Pulse: (!) 115   Temp: 97.7 °F (36.5 °C)       Physical Exam   Constitutional: He is oriented to person, place, and time. He appears well-developed and well-nourished. No distress.   HENT:   Head: Normocephalic and atraumatic.   Mouth/Throat: Uvula is midline and mucous membranes are normal. Posterior oropharyngeal erythema present. No oropharyngeal exudate or posterior oropharyngeal edema.   Eyes: Pupils are equal, round, and  reactive to light. EOM are normal. Right eye exhibits no discharge. Left eye exhibits no discharge. No scleral icterus.   Neck: Normal range of motion.   Cardiovascular: Tachycardia present.   Pulmonary/Chest: Effort normal and breath sounds normal.   Abdominal: Soft. Bowel sounds are normal.   g tube present   Musculoskeletal: Normal range of motion. He exhibits no edema or deformity.   Neurological: He is alert and oriented to person, place, and time. No cranial nerve deficit.   Skin: Skin is warm and dry. No rash noted. He is not diaphoretic. No erythema. No pallor.   Psychiatric: He has a normal mood and affect. His behavior is normal. Judgment and thought content normal.       LABS:    WBC   Date Value Ref Range Status   06/17/2019 5.34 3.90 - 12.70 K/uL Final     Hemoglobin   Date Value Ref Range Status   06/17/2019 10.8 (L) 14.0 - 18.0 g/dL Final     POC Hematocrit   Date Value Ref Range Status   05/08/2019 24 (L) 36 - 54 %PCV Final     Hematocrit   Date Value Ref Range Status   06/17/2019 33.4 (L) 40.0 - 54.0 % Final     Platelets   Date Value Ref Range Status   06/17/2019 133 (L) 150 - 350 K/uL Final       Chemistry        Component Value Date/Time     (L) 06/17/2019 1441    K 4.6 06/17/2019 1441    CL 97 06/17/2019 1441    CO2 21 (L) 06/17/2019 1441    BUN 57 (H) 06/17/2019 1441    CREATININE 1.2 06/17/2019 1441    GLU 94 06/17/2019 1441        Component Value Date/Time    CALCIUM 8.3 (L) 06/17/2019 1441    ALKPHOS 106 06/17/2019 1441    AST 25 06/17/2019 1441    ALT 67 (H) 06/17/2019 1441    BILITOT 0.5 06/17/2019 1441    ESTGFRAFRICA >60.0 06/17/2019 1441    EGFRNONAA >60.0 06/17/2019 1441          Assessment:       1. Esophageal cancer, stage IIA    2. Chemotherapy induced nausea and vomiting    3. Chemotherapy-induced thrombocytopenia    4. Antineoplastic chemotherapy induced anemia    5. Dehydration due to radiation    6. Weight loss    7. Benign prostatic hyperplasia (BPH) with straining on  urination          Plan:         1- Stage IIA Esophageal Cancer: 69 y.o. male, patient of Dr. Lewis, to clinic for follow up after completing chemo/ XRT on 6/12/19. He is struggling with normal side effects associated with chemotherapy and radiation, which we discussed in depth. He has an appointment with Dr. Vásquez on July 3 to discuss surgical intervention.    RTC July 3 with labs and to see me or Dr. Lewis.    2- Discussed using g-tube for liquid phenergan. If this does not work, can try phenergan suppositories. He will receive IV antiemetics in the infusion center today.    3,4- Mild, will monitor. Discussed that his neutropenia has resolved and counts will continue to improve as he gets further away from treatment.     5,6- Schedule fluids today in the infusion center. Will refer to nutrition to discuss weight loss and ensuring that he is getting in enough calories. I have asked him to not try solids as I feel he is at a great risk for aspiration (has difficulty swallowing water at times). Will need speech evaluation before clearance.    7- Unable to use Flomax through g-tube. It appears that terazosin capsules can be dissolved in hot water (which may take five to 15 minutes) for administration through a feeding tube via an oral syringe. Will send prescription and notify the patient.    More than 45 mins were spent during this encounter, greater than 50% was spent in direct counseling and/or coordination of care.     Patient is in agreement with the proposed treatment plan. All questions were answered to the patient's satisfaction. Pt knows to call clinic if anything is needed before the next clinic visit.    GLYNN Dunbar  Hematology and Medical Oncology

## 2019-06-18 DIAGNOSIS — C15.9 ESOPHAGEAL CANCER, STAGE IIA: Primary | ICD-10-CM

## 2019-06-18 NOTE — TELEPHONE ENCOUNTER
tried reaching out to pt on today to discuss appointment on 06/19/19, but no answer,a detail message was left on v/m to contact if need be.

## 2019-06-19 ENCOUNTER — CLINICAL SUPPORT (OUTPATIENT)
Dept: HEMATOLOGY/ONCOLOGY | Facility: CLINIC | Age: 69
End: 2019-06-19
Payer: MEDICARE

## 2019-06-19 VITALS — WEIGHT: 154.56 LBS | HEIGHT: 71 IN | BODY MASS INDEX: 21.64 KG/M2

## 2019-06-19 DIAGNOSIS — Z71.3 NUTRITIONAL COUNSELING: Primary | ICD-10-CM

## 2019-06-19 DIAGNOSIS — C15.9 ESOPHAGEAL CANCER, STAGE IIA: ICD-10-CM

## 2019-06-19 DIAGNOSIS — N18.30 CHRONIC KIDNEY DISEASE (CKD), STAGE III (MODERATE): ICD-10-CM

## 2019-06-19 PROCEDURE — 99999 PR PBB SHADOW E&M-EST. PATIENT-LVL II: ICD-10-PCS | Mod: PBBFAC,,, | Performed by: DIETITIAN, REGISTERED

## 2019-06-19 PROCEDURE — 97802 MEDICAL NUTRITION INDIV IN: CPT | Mod: S$GLB,,, | Performed by: DIETITIAN, REGISTERED

## 2019-06-19 PROCEDURE — 97802 PR MED NUTR THER, 1ST, INDIV, EA 15 MIN: ICD-10-PCS | Mod: S$GLB,,, | Performed by: DIETITIAN, REGISTERED

## 2019-06-19 PROCEDURE — 99999 PR PBB SHADOW E&M-EST. PATIENT-LVL II: CPT | Mod: PBBFAC,,, | Performed by: DIETITIAN, REGISTERED

## 2019-06-19 NOTE — PROGRESS NOTES
"Medical Nutrition Therapy Oncology Progress Note    Name: Brooks Canas MRN: [id number]  : 1950    Age: 69 y.o.  Ethnicity: /White Language: English    Diagnosis: No diagnosis found.    Chemo Regimen: Carbo/taxol   Referring MD: Dr. Bermudez Frequency:  Radiation: Yes           Goal of Cancer treatment Curative         Nutrition Assessment     Chief Complaint:   Chief Complaint   Patient presents with    Nutrition Counseling        Anthropometric Measurements:  Height: 5' 10.5" (1.791 m)  Current Weight: 70.1 kg (154 lb 8.7 oz)  Ideal Body Weight: 169#  Percent Ideal Body Weight:: 91%  BMI: Body mass index is 21.86 kg/m².     Weight History:   Wt Readings from Last 8 Encounters:   19 70.1 kg (154 lb 8.7 oz)   19 70.8 kg (156 lb 1.4 oz)   19 73.3 kg (161 lb 9.6 oz)   19 73.3 kg (161 lb 9.6 oz)   19 75.8 kg (167 lb 1.7 oz)   19 77.1 kg (169 lb 15.6 oz)   19 76.4 kg (168 lb 6.9 oz)   19 81 kg (178 lb 9.2 oz)          Medical Health History:  Feeding tube placed: Yes - J-tube  Pre-treatment: No    Past Surgical History:   Procedure Laterality Date    ANGIOGRAM-EXTREMITY-LOWER Bilateral 3/2/2016    Performed by Phoenix Ayers [facility] at [facility] CATH LAB    AORTOGRAM WITH RUNOFF Bilateral 2017    Performed by Phoenix Ayers [facility] at [facility] CATH LAB    APPENDECTOMY      DRQJYU-LJZYZZL-GYTBVRSDD Right 3/4/2016    Performed by Aris Finney Jr., MD at [facility] OR    CATARACT EXTRACTION Right     EGD (ESOPHAGOGASTRODUODENOSCOPY) Left 2019    Performed by Carine Le MD at [facility] ENDO    FEMORAL BYPASS Right 2016    INSERTION, JEJUNOSTOMY TUBE N/A 2019    Performed by [facility] G [facility]ell [facility] at [facility] OR 2ND FLR    LAPAROTOMY, EXPLORATORY N/A 2019    Performed by Everardo Smith MD at [facility] OR 2ND FLR    REVISION- Jejunostomy N/A 2019    Performed by Everardo Smith MD at [facility] OR [address] [place] [address]    ULTRASOUND, UPPER GI TRACT, ENDOSCOPIC N/A " 5/3/2019    Performed by Pacheco Ferreira MD at [facility] (2ND FLR)        Medications:   Current Outpatient Medications:     allopurinol (ZYLOPRIM) 100 MG tablet, Take 100 mg by mouth once daily., Disp: , Rfl: 3    amLODIPine (NORVASC) 10 MG tablet, TAKE 1 TABLET(10 MG) BY MOUTH EVERY DAY, Disp: 90 tablet, Rfl: 0    atorvastatin (LIPITOR) 40 MG tablet, TAKE 1 TABLET BY MOUTH ONCE DAILY, Disp: 30 tablet, Rfl: 0    benazepril (LOTENSIN) 20 MG tablet, TAKE 1 TABLET(20 MG) BY MOUTH EVERY DAY, Disp: 90 tablet, Rfl: 0    carvedilol (COREG) 6.25 MG tablet, TAKE 1 TABLET(6.25 MG) BY MOUTH TWICE DAILY, Disp: 180 tablet, Rfl: 0    magic mouthwash diphen/antac/lidoc/nysta, Swish and swallow 10 mLs 4 (four) times daily., Disp: 500 mL, Rfl: 5    nepafenac (ILEVRO) 0.3 % DrpS, Apply 1 drop to eye once daily., Disp: , Rfl:     ondansetron (ZOFRAN-ODT) 8 MG TbDL, Take 1 tablet (8 mg total) by mouth every 8 (eight) hours as needed (nausea or vomiting - CANCER PATIENT ON CHEMO)., Disp: 30 tablet, Rfl: 1    oxyCODONE (ROXICODONE) 5 MG immediate release tablet, Take 1 tablet (5 mg total) by mouth every 6 (six) hours as needed for Pain., Disp: 28 tablet, Rfl: 0    promethazine (PHENERGAN) 6.25 mg/5 mL syrup, Take 10 mLs (12.5 mg total) by mouth every 6 (six) hours as needed for Nausea., Disp: 473 mL, Rfl: 0    sucralfate (CARAFATE) 100 mg/mL suspension, Take 10 mLs (1 g total) by mouth 4 (four) times daily., Disp: 420 mL, Rfl: 1    terazosin (HYTRIN) 2 MG capsule, DISSOLVE ONE CAPSULE IN HOT WATER WHICH MAY TAKE 5 TO 15 MINUTES VIA GTUBE, Disp: 90 capsule, Rfl: 1    Last Labs:  Last Labs:  Glucose   Date Value Ref Range Status   06/17/2019 94 70 - 110 mg/dL Final   06/12/2019 148 (H) 70 - 110 mg/dL Final     BUN, Bld   Date Value Ref Range Status   06/17/2019 57 (H) 8 - 23 mg/dL Final   06/12/2019 41 (H) 8 - 23 mg/dL Final     Creatinine   Date Value Ref Range Status   06/17/2019 1.2 0.5 - 1.4 mg/dL Final   06/12/2019 1.3 0.5  - 1.4 mg/dL Final     Sodium   Date Value Ref Range Status   06/17/2019 128 (L) 136 - 145 mmol/L Final   06/12/2019 135 (L) 136 - 145 mmol/L Final     Potassium   Date Value Ref Range Status   06/17/2019 4.6 3.5 - 5.1 mmol/L Final   06/12/2019 5.4 (H) 3.5 - 5.1 mmol/L Final     Comment:     *No Visible Hemolysis     Phosphorus   Date Value Ref Range Status   05/07/2019 3.2 2.7 - 4.5 mg/dL Final   05/06/2019 2.7 2.7 - 4.5 mg/dL Final     Calcium   Date Value Ref Range Status   06/17/2019 8.3 (L) 8.7 - 10.5 mg/dL Final   06/12/2019 9.3 8.7 - 10.5 mg/dL Final     Prealbumin   Date Value Ref Range Status   04/29/2019 19 (L) 20 - 43 mg/dL Final     Total Protein   Date Value Ref Range Status   06/17/2019 5.1 (L) 6.0 - 8.4 g/dL Final   06/12/2019 5.7 (L) 6.0 - 8.4 g/dL Final     Cholesterol   Date Value Ref Range Status   08/31/2016 144 100 - 200 mg/dL Final     No results found for: HGBA1C  Hemoglobin   Date Value Ref Range Status   06/17/2019 10.8 (L) 14.0 - 18.0 g/dL Final   06/12/2019 12.5 (L) 14.0 - 18.0 g/dL Final     POC Hematocrit   Date Value Ref Range Status   05/08/2019 24 (L) 36 - 54 %PCV Final     Hematocrit   Date Value Ref Range Status   06/17/2019 33.4 (L) 40.0 - 54.0 % Final   06/12/2019 38.8 (L) 40.0 - 54.0 % Final     Iron   Date Value Ref Range Status   04/27/2019 242 (H) 45 - 160 ug/dL Final     No components found for: FROLATE  No results found for: WQXPSOGT51EA  WBC   Date Value Ref Range Status   06/17/2019 5.34 3.90 - 12.70 K/uL Final   06/12/2019 2.97 (L) 3.90 - 12.70 K/uL Final         Client History/Food Access:    Living Situation: Lives with friend   Who: Shops for Groceries? Spouse   Who : Prepares meals? Spouse   Who: Fills prescriptions? Patient   Are there financial difficulties purchasing food? No   Personal History (occupation, physical activity level, exercise): In wheelchair     Baseline for Outcomes Monitoring  Food and Nutrition History: Pt here with [facility] for nutrition counseling  after completing chemo/radiation for esophageal cancer. Current weight of 154# which is a 24# weight loss in less than 2 months. Pt meeting with thoracic in a few weeks to discuss possible esophagectomy. Briefly reviewed diet after esophagectomy. Currently on J-tube feedings: 4 cans of Isosource 1.5 continuously at rate of 100 ml/hr over 10 hours in the evening/night. Pt continues with weight loss. Spoke to [facility] to confirm order. Changed order to 5 cans/day continuously over 12 hours/day with flush and feed bag. Water running at 30 ml/hr for 12 hours. Pt not eating any solid food; asked about liquids. NP note mentions ST consult for swallow study. Reviewed medications, supplement/herbal intake and no food/med interactions noted. Lab results noted. Compliance is fair. Comprehension is fair.      Nutritional Needs:  Estimated Needs Method Use    2100 kcal/day [] Predictive Equation: Magana-Gifford   [x]  30 kcal/kg   Protein 70-80 g 1.0-1.2 gm/kg/day   Fluid 2100 ml 30 ml/kg/day     Food/Nutrient Intake (oral, enteral or parenteral) and Patient Behaviors     Calorie intake: Inadequate   Protein intake: Inadequate     Yes/No    Yes Uses medical food supplements   Yes Cooking techniques to minimize fatigue   Yes Currently modifying food textures   No Able to maintain usual physical actiivty     Nutrition Diagnosis     Nutrition Diagnosis Related to (Etiology) As Evidenced By (Signs/Symptoms)   Malnutrition Inadequate enteral feedings Less than 75% estimated intake; 13% weight loss in 2 months                 Nutritional Intervention and Prescription        Nutrition Intervention Enteral Nutrition  Volume   Goals/Expected Outcomes Recommend 5 cans/day Isosource 1.5 continuously at rate of 100 ml/hr over 12 hours/day with flush and feed bag. Water running at 30 ml/hr for 12 hours.   Progress Initial     Pt needs education? yes (see intervention)    Coordination of Nutrition Care: Comments:   Referral to community  agencies/programs [facility]   Collaboration with other providers MD     Monitoring and Evaluation     Monitor: weight    Next Visit: No follow-ups on file.    Materials Provided:  1. 2.   3. 4.   5. 6.     Total time: 45 minutes    Nutrition Score:      ©2010 [employer] Oncology Toolkit   Answers for HPI/ROS submitted by the patient on 6/18/2019   appetite change : Yes  unexpected weight change: Yes  visual disturbance: No  cough: No  shortness of breath: No  chest pain: No  abdominal pain: No  diarrhea: Yes  frequency: No  back pain: No  rash: No  headaches: No  adenopathy: No  nervous/ anxious: No

## 2019-06-21 ENCOUNTER — TELEPHONE (OUTPATIENT)
Dept: RADIATION ONCOLOGY | Facility: CLINIC | Age: 69
End: 2019-06-21

## 2019-06-21 NOTE — TELEPHONE ENCOUNTER
Spoke to pt this morning to review plan for tube feedings. Pt will continue Isosource 1.5 but increase from 4 cans/day to 5 cans/day through the pump as pt has a J-tube rather than a G-tube. Pt should also be receiving a flush and feed bag in the next day or two in efforts to increase hydration and water intake. Pt expresses understanding.

## 2019-06-26 ENCOUNTER — PATIENT MESSAGE (OUTPATIENT)
Dept: HEMATOLOGY/ONCOLOGY | Facility: CLINIC | Age: 69
End: 2019-06-26

## 2020-07-24 NOTE — PROGRESS NOTES
ATTENDING NOTE, ONCOLOGY INPATIENT TEAM    Events of last 24 hours noted.  Patient seen and examined, chart reviewed.  His biopsy is c/w a poorly differentiated adenocarcinoma.    On examination he appears comfortable, in NAD.  Lungs are clear to auscultation.  Abdomen is soft, with mild tenderness around his incision and his tube insertion site.    Case discussed with Dr. Vásquez.    RECOMMEND    Radiation Oncology consult.  Bone scan  Brain MRI.  If there is no evidence of distant metastases, he will be offered combined chemo-XRT.  This will be followed by an attempt at resection by Dr. Vásquez.  His workup can be completed as an outpatient; upon discharge, please refer to the oncology Clinic to see either Dr. Lewis or me.      Vijay Forrest MD     debilitation of gait

## 2022-06-27 NOTE — ASSESSMENT & PLAN NOTE
Initial open jejunostomy placed 05/01/19 which fell out on POD 1 and replaced POD 0 05/02/19. Doing well now without any issues. Tolerating diet and tube feeds.     -tube feeds to goal  -okay for diet, advance as tolerated  -surgery will sign off. 2 week follow up with eyad.   - Please call if any questions, thank you.    27-Jun-2022 09:51

## 2024-04-16 NOTE — PLAN OF CARE
Problem: Adult Inpatient Plan of Care  Goal: Plan of Care Review  Outcome: Ongoing (interventions implemented as appropriate)  Day 18 of outpatient radiation to the esophagus alix stable       Opt out

## (undated) DEVICE — GAUZE SPONGE 4X4 12PLY

## (undated) DEVICE — SUT SILK 2-0 SH 18IN BLACK

## (undated) DEVICE — SPONGE IV DRAIN 4X4 STERILE

## (undated) DEVICE — SUT 1 36IN PDS II VIO MONO

## (undated) DEVICE — TRAY MINOR GEN SURG

## (undated) DEVICE — SUT CHROMIC 3-0 SH 27IN GUT

## (undated) DEVICE — SUT SILK 3-0 SH 18IN BLACK

## (undated) DEVICE — DRAPE ABDOMINAL TIBURON 14X11

## (undated) DEVICE — ELECTRODE REM PLYHSV RETURN 9

## (undated) DEVICE — GOWN SURGICAL X-LARGE

## (undated) DEVICE — SUT VICRYL PLUS 3-0 SH 18IN

## (undated) DEVICE — DRAPE INCISE IOBAN 2 23X17IN

## (undated) DEVICE — SEE MEDLINE ITEM 156902

## (undated) DEVICE — SPONGE LAP 18X18 PREWASHED

## (undated) DEVICE — SEE MEDLINE ITEM 152622

## (undated) DEVICE — DRESSING ABSRBNT ISLAND 3.6X8

## (undated) DEVICE — SEE MEDLINE ITEM 146417

## (undated) DEVICE — TOWEL OR XRAY WHITE 17X26IN

## (undated) DEVICE — APPLICATOR CHLORAPREP ORN 26ML

## (undated) DEVICE — SEE MEDLINE ITEM 157117

## (undated) DEVICE — BLADE 4 INCH EDGE UN-INS

## (undated) DEVICE — STAPLER SKIN ROTATING HEAD

## (undated) DEVICE — SUT 1 48IN PDS II VIO MONO

## (undated) DEVICE — SUT ETHILON 2-0 PSLX 30IN